# Patient Record
Sex: MALE | Race: WHITE | Employment: OTHER | URBAN - METROPOLITAN AREA
[De-identification: names, ages, dates, MRNs, and addresses within clinical notes are randomized per-mention and may not be internally consistent; named-entity substitution may affect disease eponyms.]

---

## 2017-05-15 ENCOUNTER — OFFICE VISIT (OUTPATIENT)
Dept: INTERNAL MEDICINE | Age: 77
End: 2017-05-15

## 2017-05-15 VITALS
SYSTOLIC BLOOD PRESSURE: 138 MMHG | HEIGHT: 70 IN | BODY MASS INDEX: 24.62 KG/M2 | DIASTOLIC BLOOD PRESSURE: 68 MMHG | HEART RATE: 72 BPM | WEIGHT: 172 LBS

## 2017-05-15 DIAGNOSIS — C32.9 LARYNX CANCER (HCC): Chronic | ICD-10-CM

## 2017-05-15 DIAGNOSIS — R97.20 ELEVATED PSA: ICD-10-CM

## 2017-05-15 DIAGNOSIS — R26.9 ABNORMALITY OF GAIT AND MOBILITY: ICD-10-CM

## 2017-05-15 DIAGNOSIS — I10 ESSENTIAL HYPERTENSION: Chronic | ICD-10-CM

## 2017-05-15 DIAGNOSIS — Z00.00 PREVENTATIVE HEALTH CARE: Primary | ICD-10-CM

## 2017-05-15 PROCEDURE — 99214 OFFICE O/P EST MOD 30 MIN: CPT | Performed by: INTERNAL MEDICINE

## 2017-05-15 PROCEDURE — G8420 CALC BMI NORM PARAMETERS: HCPCS | Performed by: INTERNAL MEDICINE

## 2017-05-15 PROCEDURE — 1036F TOBACCO NON-USER: CPT | Performed by: INTERNAL MEDICINE

## 2017-05-15 PROCEDURE — 4040F PNEUMOC VAC/ADMIN/RCVD: CPT | Performed by: INTERNAL MEDICINE

## 2017-05-15 PROCEDURE — 1123F ACP DISCUSS/DSCN MKR DOCD: CPT | Performed by: INTERNAL MEDICINE

## 2017-05-15 PROCEDURE — G8427 DOCREV CUR MEDS BY ELIG CLIN: HCPCS | Performed by: INTERNAL MEDICINE

## 2017-05-15 ASSESSMENT — ENCOUNTER SYMPTOMS
TROUBLE SWALLOWING: 0
GASTROINTESTINAL NEGATIVE: 1
BACK PAIN: 0

## 2017-05-15 ASSESSMENT — PATIENT HEALTH QUESTIONNAIRE - PHQ9
1. LITTLE INTEREST OR PLEASURE IN DOING THINGS: 0
SUM OF ALL RESPONSES TO PHQ QUESTIONS 1-9: 0
SUM OF ALL RESPONSES TO PHQ9 QUESTIONS 1 & 2: 0
2. FEELING DOWN, DEPRESSED OR HOPELESS: 0

## 2017-05-23 DIAGNOSIS — R26.9 ABNORMALITY OF GAIT AND MOBILITY: ICD-10-CM

## 2017-06-12 ENCOUNTER — TELEPHONE (OUTPATIENT)
Dept: INTERNAL MEDICINE | Age: 77
End: 2017-06-12

## 2017-11-16 ENCOUNTER — TELEPHONE (OUTPATIENT)
Dept: INTERNAL MEDICINE | Age: 77
End: 2017-11-16

## 2017-11-16 RX ORDER — LOSARTAN POTASSIUM 100 MG/1
100 TABLET ORAL DAILY
Qty: 90 TABLET | Refills: 3 | Status: SHIPPED | OUTPATIENT
Start: 2017-11-16 | End: 2018-11-21 | Stop reason: SDUPTHER

## 2017-11-20 ENCOUNTER — OFFICE VISIT (OUTPATIENT)
Dept: INTERNAL MEDICINE | Age: 77
End: 2017-11-20

## 2017-11-20 VITALS
WEIGHT: 178 LBS | BODY MASS INDEX: 25.48 KG/M2 | DIASTOLIC BLOOD PRESSURE: 90 MMHG | HEIGHT: 70 IN | SYSTOLIC BLOOD PRESSURE: 148 MMHG | HEART RATE: 80 BPM

## 2017-11-20 DIAGNOSIS — H01.006 BLEPHARITIS OF BOTH EYES, UNSPECIFIED EYELID, UNSPECIFIED TYPE: ICD-10-CM

## 2017-11-20 DIAGNOSIS — R97.20 ELEVATED PSA: ICD-10-CM

## 2017-11-20 DIAGNOSIS — H01.003 BLEPHARITIS OF BOTH EYES, UNSPECIFIED EYELID, UNSPECIFIED TYPE: ICD-10-CM

## 2017-11-20 DIAGNOSIS — I10 ESSENTIAL HYPERTENSION: Chronic | ICD-10-CM

## 2017-11-20 DIAGNOSIS — R26.9 ABNORMALITY OF GAIT AND MOBILITY: ICD-10-CM

## 2017-11-20 DIAGNOSIS — J39.8 CHRONIC MUCUS HYPERSECRETION, RESPIRATORY: ICD-10-CM

## 2017-11-20 PROCEDURE — G8419 CALC BMI OUT NRM PARAM NOF/U: HCPCS | Performed by: INTERNAL MEDICINE

## 2017-11-20 PROCEDURE — 1036F TOBACCO NON-USER: CPT | Performed by: INTERNAL MEDICINE

## 2017-11-20 PROCEDURE — 99214 OFFICE O/P EST MOD 30 MIN: CPT | Performed by: INTERNAL MEDICINE

## 2017-11-20 PROCEDURE — 1123F ACP DISCUSS/DSCN MKR DOCD: CPT | Performed by: INTERNAL MEDICINE

## 2017-11-20 PROCEDURE — G8484 FLU IMMUNIZE NO ADMIN: HCPCS | Performed by: INTERNAL MEDICINE

## 2017-11-20 PROCEDURE — G8427 DOCREV CUR MEDS BY ELIG CLIN: HCPCS | Performed by: INTERNAL MEDICINE

## 2017-11-20 PROCEDURE — 4040F PNEUMOC VAC/ADMIN/RCVD: CPT | Performed by: INTERNAL MEDICINE

## 2017-11-20 RX ORDER — AMLODIPINE BESYLATE 5 MG/1
TABLET ORAL
COMMUNITY
Start: 2017-09-12 | End: 2018-05-21

## 2017-11-20 ASSESSMENT — ENCOUNTER SYMPTOMS
COUGH: 1
EYE ITCHING: 1
EYE DISCHARGE: 1
TROUBLE SWALLOWING: 0

## 2017-11-20 NOTE — PROGRESS NOTES
SUBJECTIVE:    Patient ID: Shakir Wagoner is an 67 y.o. male. HPI: Patient here today for the f/u of chronic problems -- see Problem List and associated comments. New issues or complaints include (also see Assessment for more details):  Here for routine checkup. Home blood pressure monitoring reviewed. He declines a flu shot and declines colonoscopy. Review of Systems   Constitutional: Negative for fever. HENT: Negative for trouble swallowing. Eyes: Positive for discharge and itching. Respiratory: Positive for cough. Cardiovascular: Negative. Musculoskeletal: Negative. Neurological: Negative. Psychiatric/Behavioral: Negative. OBJECTIVE:    BP (!) 148/90 (Site: Left Arm, Position: Sitting, Cuff Size: Medium Adult)   Pulse 80   Ht 5' 10\" (1.778 m)   Wt 178 lb (80.7 kg)   BMI 25.54 kg/m²      Physical Exam   Constitutional: He is oriented to person, place, and time. He appears well-developed and well-nourished. No distress. Eyes: EOM are normal. Right eye exhibits no discharge. Left eye exhibits no discharge. No scleral icterus. Bilateral lower lidserythematous but no discharge   Neck: No JVD present. Carotid bruit is not present. No neck rigidity. Decreased range of motion present. Resection site OK   Cardiovascular: Normal rate, regular rhythm and normal heart sounds. Pulmonary/Chest: Effort normal and breath sounds normal. No stridor. No respiratory distress. Abdominal: Soft. Bowel sounds are normal.   Musculoskeletal: He exhibits no edema. Lymphadenopathy:        Head (right side): No submandibular adenopathy present. Head (left side): No submandibular adenopathy present. He has no cervical adenopathy. Right: No supraclavicular adenopathy present. Left: No supraclavicular adenopathy present. Neurological: He is alert and oriented to person, place, and time. He has normal strength. He displays no atrophy. He exhibits normal muscle tone.  Gait abnormal.   Skin: He is not diaphoretic. No pallor. Psychiatric: He has a normal mood and affect. His behavior is normal. Judgment and thought content normal.       ASSESSMENT:       Encounter Diagnoses   Name Primary?  Essential hypertension     Chronic mucus hypersecretion, respiratory     Elevated PSA     Blepharitis of both eyes, unspecified eyelid, unspecified type     Abnormality of gait and mobility        HTN (hypertension)  Home BP monitoring okay. Occasional elevated at 140s but overall good blood pressure control. Chronic mucus hypersecretion, respiratory  Stable    Elevated PSA  3.15continue to monitor    Blepharitis of both eyes  Bilateral. Seems to wax and wane. Advised to get baby shampoo and use routinely. He does not shower or wash much. Abnormality of gait and mobility  He declined EMG or imaging. PLAN:  See ASSESSMENT for evaluation & PLAN    No orders of the defined types were placed in this encounter. PSH, PMH, SH and FH reviewed and noted. Recent and past labs, tests and consults also reviewed. Recent or new meds also reviewed.

## 2017-11-20 NOTE — ASSESSMENT & PLAN NOTE
Bilateral. Seems to wax and wane. Advised to get baby shampoo and use routinely. He does not shower or wash much.

## 2018-05-21 ENCOUNTER — OFFICE VISIT (OUTPATIENT)
Dept: INTERNAL MEDICINE | Age: 78
End: 2018-05-21

## 2018-05-21 VITALS
HEART RATE: 72 BPM | DIASTOLIC BLOOD PRESSURE: 70 MMHG | OXYGEN SATURATION: 95 % | BODY MASS INDEX: 24.91 KG/M2 | WEIGHT: 174 LBS | SYSTOLIC BLOOD PRESSURE: 138 MMHG | HEIGHT: 70 IN

## 2018-05-21 DIAGNOSIS — I34.0 MITRAL VALVE INSUFFICIENCY, UNSPECIFIED ETIOLOGY: Chronic | ICD-10-CM

## 2018-05-21 DIAGNOSIS — R97.20 ELEVATED PSA: Primary | ICD-10-CM

## 2018-05-21 DIAGNOSIS — I10 ESSENTIAL HYPERTENSION: Chronic | ICD-10-CM

## 2018-05-21 DIAGNOSIS — Z00.00 PREVENTATIVE HEALTH CARE: ICD-10-CM

## 2018-05-21 PROCEDURE — 4040F PNEUMOC VAC/ADMIN/RCVD: CPT | Performed by: INTERNAL MEDICINE

## 2018-05-21 PROCEDURE — G8420 CALC BMI NORM PARAMETERS: HCPCS | Performed by: INTERNAL MEDICINE

## 2018-05-21 PROCEDURE — G8427 DOCREV CUR MEDS BY ELIG CLIN: HCPCS | Performed by: INTERNAL MEDICINE

## 2018-05-21 PROCEDURE — 1123F ACP DISCUSS/DSCN MKR DOCD: CPT | Performed by: INTERNAL MEDICINE

## 2018-05-21 PROCEDURE — 1036F TOBACCO NON-USER: CPT | Performed by: INTERNAL MEDICINE

## 2018-05-21 PROCEDURE — 99214 OFFICE O/P EST MOD 30 MIN: CPT | Performed by: INTERNAL MEDICINE

## 2018-05-21 RX ORDER — AMLODIPINE BESYLATE 10 MG/1
TABLET ORAL
COMMUNITY
Start: 2018-03-15 | End: 2018-12-04 | Stop reason: SDUPTHER

## 2018-05-21 ASSESSMENT — PATIENT HEALTH QUESTIONNAIRE - PHQ9
SUM OF ALL RESPONSES TO PHQ QUESTIONS 1-9: 0
1. LITTLE INTEREST OR PLEASURE IN DOING THINGS: 0
SUM OF ALL RESPONSES TO PHQ9 QUESTIONS 1 & 2: 0
2. FEELING DOWN, DEPRESSED OR HOPELESS: 0

## 2018-05-21 ASSESSMENT — ENCOUNTER SYMPTOMS
RESPIRATORY NEGATIVE: 1
GASTROINTESTINAL NEGATIVE: 1
TROUBLE SWALLOWING: 0

## 2018-11-15 ENCOUNTER — TELEPHONE (OUTPATIENT)
Dept: DERMATOLOGY | Age: 78
End: 2018-11-15

## 2018-11-21 RX ORDER — LOSARTAN POTASSIUM 100 MG/1
TABLET ORAL
Qty: 90 TABLET | Refills: 2 | Status: SHIPPED | OUTPATIENT
Start: 2018-11-21 | End: 2019-02-13

## 2018-12-04 ENCOUNTER — OFFICE VISIT (OUTPATIENT)
Dept: INTERNAL MEDICINE CLINIC | Age: 78
End: 2018-12-04
Payer: MEDICARE

## 2018-12-04 VITALS
BODY MASS INDEX: 24.91 KG/M2 | HEIGHT: 70 IN | SYSTOLIC BLOOD PRESSURE: 132 MMHG | WEIGHT: 174 LBS | DIASTOLIC BLOOD PRESSURE: 70 MMHG

## 2018-12-04 DIAGNOSIS — Z23 NEED FOR INFLUENZA VACCINATION: ICD-10-CM

## 2018-12-04 DIAGNOSIS — I87.2 VENOUS STASIS DERMATITIS OF LEFT LOWER EXTREMITY: ICD-10-CM

## 2018-12-04 DIAGNOSIS — R60.0 EDEMA OF BOTH LEGS: ICD-10-CM

## 2018-12-04 DIAGNOSIS — I10 ESSENTIAL HYPERTENSION: Primary | Chronic | ICD-10-CM

## 2018-12-04 DIAGNOSIS — R97.20 ELEVATED PSA: ICD-10-CM

## 2018-12-04 DIAGNOSIS — I34.0 MITRAL VALVE INSUFFICIENCY, UNSPECIFIED ETIOLOGY: Chronic | ICD-10-CM

## 2018-12-04 PROCEDURE — G8427 DOCREV CUR MEDS BY ELIG CLIN: HCPCS | Performed by: INTERNAL MEDICINE

## 2018-12-04 PROCEDURE — G0008 ADMIN INFLUENZA VIRUS VAC: HCPCS | Performed by: INTERNAL MEDICINE

## 2018-12-04 PROCEDURE — 4040F PNEUMOC VAC/ADMIN/RCVD: CPT | Performed by: INTERNAL MEDICINE

## 2018-12-04 PROCEDURE — 99215 OFFICE O/P EST HI 40 MIN: CPT | Performed by: INTERNAL MEDICINE

## 2018-12-04 PROCEDURE — 1123F ACP DISCUSS/DSCN MKR DOCD: CPT | Performed by: INTERNAL MEDICINE

## 2018-12-04 PROCEDURE — G8420 CALC BMI NORM PARAMETERS: HCPCS | Performed by: INTERNAL MEDICINE

## 2018-12-04 PROCEDURE — 1036F TOBACCO NON-USER: CPT | Performed by: INTERNAL MEDICINE

## 2018-12-04 PROCEDURE — 1101F PT FALLS ASSESS-DOCD LE1/YR: CPT | Performed by: INTERNAL MEDICINE

## 2018-12-04 PROCEDURE — 90662 IIV NO PRSV INCREASED AG IM: CPT | Performed by: INTERNAL MEDICINE

## 2018-12-04 PROCEDURE — G8482 FLU IMMUNIZE ORDER/ADMIN: HCPCS | Performed by: INTERNAL MEDICINE

## 2018-12-04 RX ORDER — TRIAMTERENE AND HYDROCHLOROTHIAZIDE 37.5; 25 MG/1; MG/1
1 TABLET ORAL DAILY
Qty: 90 TABLET | Refills: 3 | Status: SHIPPED | OUTPATIENT
Start: 2018-12-04 | End: 2019-06-04

## 2018-12-04 RX ORDER — AMLODIPINE BESYLATE 10 MG/1
5 TABLET ORAL
Qty: 30 TABLET | COMMUNITY
Start: 2018-12-04 | End: 2019-02-13

## 2018-12-04 ASSESSMENT — ENCOUNTER SYMPTOMS
SHORTNESS OF BREATH: 0
TROUBLE SWALLOWING: 0

## 2018-12-04 NOTE — PROGRESS NOTES
Vaccine Information Sheet, \"Influenza - Inactivated\"  given to Leticia Mendez, or parent/legal guardian of  Leticia Mendez and verbalized understanding. Patient responses:    Have you ever had a reaction to a flu vaccine? No  Are you able to eat eggs without adverse effects? Yes  Do you have any current illness? No  Have you ever had Guillian [place] Syndrome? No    Flu vaccine given per order. Please see immunization tab.
strength. He displays no tremor. Gait abnormal.   Skin: He is not diaphoretic. No pallor. Raised dermatitis-like lesion over left shin with 2 areas of ulceration with eschars. Psychiatric: He has a normal mood and affect. His behavior is normal. Judgment and thought content normal.       ASSESSMENT:       Encounter Diagnoses   Name Primary?  Essential hypertension Yes    Need for influenza vaccination     Elevated PSA     Mitral valve insufficiency, unspecified etiology     Edema of both legs     Venous stasis dermatitis of left lower extremity        HTN (hypertension)  BP well controlled on amlodipine 10 mg and losartan. However he is having significant lower extremity edema. We'll decrease amlodipine to one half tablet-5 mg, and add Maxide. Labs in 6 months. Monitor BP. Elevated PSA  Slightly increased from last time at 3.51. Recheck 6 months. Edema of both legs  More edema since amlodipine was increased to 10 mg.  We'll go to 5 mg amlodipine and add Maxide. Patient to monitor BP. Labs and 6 months. Venous stasis dermatitis of left lower extremity  Suspect venous stasis dermatitis since leg is swollen. However no rash or lesions on the right lower extremity. There is also some nonhealing ulcerations that have been scabbed over. Will refer to dermatology for their opinion. Hopefully this will clear as the edema is dealt with. Mitral valve regurgitation  Heart murmur may be louder and more prominent than last time. Since he has more edema in his lower extremities will repeat echo.         PLAN:See ASSESSMENT for evaluation & PLAN     Orders Placed This Encounter   Procedures    INFLUENZA, HIGH DOSE, 65 YRS +, IM, PF, PREFILL SYR, 0.5ML (FLUZONE HD)    Basic Metabolic Panel     Standing Status:   Future     Standing Expiration Date:   12/4/2019    PSA, Prostatic Specific Antigen     Standing Status:   Future     Standing Expiration Date:   12/5/2019    ECHO Complete 2D W

## 2018-12-04 NOTE — ASSESSMENT & PLAN NOTE
More edema since amlodipine was increased to 10 mg.  We'll go to 5 mg amlodipine and add Maxide. Patient to monitor BP. Labs and 6 months.

## 2018-12-07 ENCOUNTER — TELEPHONE (OUTPATIENT)
Dept: INTERNAL MEDICINE CLINIC | Age: 78
End: 2018-12-07

## 2018-12-12 ENCOUNTER — TELEPHONE (OUTPATIENT)
Dept: INTERNAL MEDICINE CLINIC | Age: 78
End: 2018-12-12

## 2019-01-04 ENCOUNTER — TELEPHONE (OUTPATIENT)
Dept: INTERNAL MEDICINE CLINIC | Age: 79
End: 2019-01-04

## 2019-01-04 RX ORDER — AMLODIPINE BESYLATE 5 MG/1
5 TABLET ORAL DAILY
Qty: 30 TABLET | Refills: 5 | Status: SHIPPED | OUTPATIENT
Start: 2019-01-04 | End: 2019-02-13 | Stop reason: SDUPTHER

## 2019-01-08 ENCOUNTER — TELEPHONE (OUTPATIENT)
Dept: INTERNAL MEDICINE CLINIC | Age: 79
End: 2019-01-08

## 2019-01-08 RX ORDER — OLMESARTAN MEDOXOMIL 20 MG/1
20 TABLET ORAL DAILY
Qty: 90 TABLET | Refills: 2 | OUTPATIENT
Start: 2019-01-08 | End: 2019-02-13 | Stop reason: SDUPTHER

## 2019-01-10 ENCOUNTER — TELEPHONE (OUTPATIENT)
Dept: INTERNAL MEDICINE CLINIC | Age: 79
End: 2019-01-10

## 2019-02-04 ENCOUNTER — HOSPITAL ENCOUNTER (OUTPATIENT)
Dept: NON INVASIVE DIAGNOSTICS | Age: 79
Discharge: HOME | End: 2019-02-04
Payer: MEDICARE

## 2019-02-04 LAB
LV EF: 55 %
LVEF MODALITY: NORMAL

## 2019-02-04 PROCEDURE — 93306 TTE W/DOPPLER COMPLETE: CPT

## 2019-02-13 ENCOUNTER — TELEPHONE (OUTPATIENT)
Dept: INTERNAL MEDICINE CLINIC | Age: 79
End: 2019-02-13

## 2019-02-13 RX ORDER — OLMESARTAN MEDOXOMIL 20 MG/1
TABLET ORAL
Qty: 90 TABLET | Refills: 2 | COMMUNITY
Start: 2019-02-13 | End: 2019-09-30

## 2019-02-13 RX ORDER — AMLODIPINE BESYLATE 5 MG/1
TABLET ORAL
Qty: 30 TABLET | Refills: 5 | COMMUNITY
Start: 2019-02-13 | End: 2019-02-21

## 2019-02-21 ENCOUNTER — TELEPHONE (OUTPATIENT)
Dept: INTERNAL MEDICINE CLINIC | Age: 79
End: 2019-02-21

## 2019-03-19 ENCOUNTER — TELEPHONE (OUTPATIENT)
Dept: INTERNAL MEDICINE CLINIC | Age: 79
End: 2019-03-19

## 2019-04-08 ENCOUNTER — OFFICE VISIT (OUTPATIENT)
Dept: INTERNAL MEDICINE CLINIC | Age: 79
End: 2019-04-08
Payer: MEDICARE

## 2019-04-08 VITALS — HEART RATE: 84 BPM | SYSTOLIC BLOOD PRESSURE: 110 MMHG | DIASTOLIC BLOOD PRESSURE: 60 MMHG

## 2019-04-08 DIAGNOSIS — R19.5 LOOSE STOOLS: ICD-10-CM

## 2019-04-08 DIAGNOSIS — K40.90 INGUINAL HERNIA, RIGHT: ICD-10-CM

## 2019-04-08 DIAGNOSIS — L85.3 DRY SKIN DERMATITIS: ICD-10-CM

## 2019-04-08 DIAGNOSIS — R26.9 ABNORMALITY OF GAIT AND MOBILITY: ICD-10-CM

## 2019-04-08 DIAGNOSIS — B35.6 TINEA CRURIS: ICD-10-CM

## 2019-04-08 PROCEDURE — 4040F PNEUMOC VAC/ADMIN/RCVD: CPT | Performed by: INTERNAL MEDICINE

## 2019-04-08 PROCEDURE — G8420 CALC BMI NORM PARAMETERS: HCPCS | Performed by: INTERNAL MEDICINE

## 2019-04-08 PROCEDURE — G8427 DOCREV CUR MEDS BY ELIG CLIN: HCPCS | Performed by: INTERNAL MEDICINE

## 2019-04-08 PROCEDURE — 99214 OFFICE O/P EST MOD 30 MIN: CPT | Performed by: INTERNAL MEDICINE

## 2019-04-08 PROCEDURE — 1036F TOBACCO NON-USER: CPT | Performed by: INTERNAL MEDICINE

## 2019-04-08 PROCEDURE — 1123F ACP DISCUSS/DSCN MKR DOCD: CPT | Performed by: INTERNAL MEDICINE

## 2019-04-08 RX ORDER — BETAMETHASONE DIPROPIONATE 0.5 MG/G
CREAM TOPICAL 2 TIMES DAILY
Qty: 15 G | Refills: 0 | Status: SHIPPED | OUTPATIENT
Start: 2019-04-08 | End: 2019-09-30

## 2019-04-08 RX ORDER — CHOLESTYRAMINE 4 G/9G
POWDER, FOR SUSPENSION ORAL
Qty: 1 CAN | Refills: 5 | Status: SHIPPED | OUTPATIENT
Start: 2019-04-08 | End: 2019-06-04

## 2019-04-08 RX ORDER — NYSTATIN 100000 [USP'U]/G
POWDER TOPICAL
Qty: 60 G | Refills: 1 | Status: SHIPPED | OUTPATIENT
Start: 2019-04-08 | End: 2019-09-30

## 2019-04-08 ASSESSMENT — PATIENT HEALTH QUESTIONNAIRE - PHQ9
SUM OF ALL RESPONSES TO PHQ QUESTIONS 1-9: 0
SUM OF ALL RESPONSES TO PHQ QUESTIONS 1-9: 0
2. FEELING DOWN, DEPRESSED OR HOPELESS: 0
SUM OF ALL RESPONSES TO PHQ9 QUESTIONS 1 & 2: 0
1. LITTLE INTEREST OR PLEASURE IN DOING THINGS: 0

## 2019-04-08 ASSESSMENT — ENCOUNTER SYMPTOMS
SHORTNESS OF BREATH: 0
DIARRHEA: 1

## 2019-04-08 NOTE — PROGRESS NOTES
SUBJECTIVE:  Patient ID: Urvashi Castellano is an 77 y.o. male. HPI: Patient here today for the f/u of chronic problems-- see Problem List and associated comments. New issues or complaints include (also see Assessment for more details):  Patient here today with his wife with several small issues. He has noticed an enlarging bulge in his right scrotal sac groin area. No pain. No one is sure exactly how long this is been there. He also has a rash in the groin area. Additionally he has had an itchy rash on his forearms for a few weeks. Patient also has loose stools. Approximately once per day in this goes back at least a couple months. Been no change in medications or diet. No pain and no blood. His wife has no symptoms. Additionally he has had more difficulty ambulating and getting out of chairs. Review of Systems   Constitutional: Positive for activity change. Respiratory: Negative for shortness of breath. Cardiovascular: Negative for chest pain. Gastrointestinal: Positive for diarrhea. Genitourinary: Negative for difficulty urinating. Musculoskeletal: Positive for gait problem. Skin: Positive for rash. Neurological: Positive for weakness. OBJECTIVE:    /60 (Site: Left Upper Arm, Position: Sitting, Cuff Size: Medium Adult)   Pulse 84      Physical Exam   Constitutional: He appears well-nourished. Eyes: No scleral icterus. Cardiovascular: Normal rate. Pulmonary/Chest: Effort normal. No respiratory distress. Abdominal: Soft. Bowel sounds are normal. He exhibits no distension. A hernia is present. Hernia confirmed positive in the right inguinal area (moderate sized right inguinal hernia versus possible large scrotal cystocele-nontender). Neurological: He displays tremor. He exhibits abnormal muscle tone. Coordination and gait abnormal.   Difficulty getting out of her wheelchair, and difficulty initiating steps and movement. Some spasticity noted.    Skin: Rash (inguinal-groin erythema) noted. He is not diaphoretic. Dry rash forearms       ASSESSMENT:       Encounter Diagnoses   Name Primary?  Inguinal hernia, right     Dry skin dermatitis     Tinea cruris     Abnormality of gait and mobility     Loose stools        Abnormality of gait and mobility  Situation worsening per his wife. Consider some parkinsonian features. Try low-dose Sinemet. Consider neurology evaluation. See past history of exacerbation after previous neck surgery. Patient had refused EMG and MRI in past.    Inguinal hernia, right  Inguinal hernia versus large cystocele. Difficult to differentiate. It does not bother the patient at all. Can monitor but will refer if it appears to be enlarging and certainly if there is any pain. Dry skin dermatitis  On  arms-try some steroid cream    Tinea cruris  Moderately severe in the groin area-try nystatin powder    Loose stools  Approximately once per day patient has loose stools. Due to his physical in capacities is difficult for him and often go to the toilet in time. There is no cramping. No blood. Difficult for any thorough evaluation for colonoscopy at this time. An try empiric cholestyramine powder. PLAN:See ASSESSMENT for evaluation & PLAN     No orders of the defined types were placed in this encounter. PSH, PMH, SH and FH reviewed and noted. Recent and past labs, tests and consultsalso reviewed. Recent or new meds also reviewed.

## 2019-04-08 NOTE — ASSESSMENT & PLAN NOTE
Approximately once per day patient has loose stools. Due to his physical in capacities is difficult for him and often go to the toilet in time. There is no cramping. No blood. Difficult for any thorough evaluation for colonoscopy at this time. An try empiric cholestyramine powder.

## 2019-04-08 NOTE — ASSESSMENT & PLAN NOTE
Situation worsening per his wife. Consider some parkinsonian features. Try low-dose Sinemet. Consider neurology evaluation. See past history of exacerbation after previous neck surgery.   Patient had refused EMG and MRI in past.

## 2019-04-18 ENCOUNTER — TELEPHONE (OUTPATIENT)
Dept: INTERNAL MEDICINE CLINIC | Age: 79
End: 2019-04-18

## 2019-04-18 NOTE — TELEPHONE ENCOUNTER
Spoke to pt and wife and they both stated they do not know if it is working or not because they do not know what it is supposed to do. Stated they haven't noticed anything different.

## 2019-04-19 NOTE — TELEPHONE ENCOUNTER
I wanted to see if it helped w/ his gait. Try the following for 2 weeks and INB then I rec a neurologist opinion.   Sinemet 25/100   #42    1 tid

## 2019-05-29 ENCOUNTER — TELEPHONE (OUTPATIENT)
Dept: INTERNAL MEDICINE CLINIC | Age: 79
End: 2019-05-29

## 2019-05-29 NOTE — TELEPHONE ENCOUNTER
BP 5/13/19  106/48 pulse 81  BP 5/28/19 87/48 pulse 85  BP 5/28/19 79/41 pulse 84      Pt wife  Lamin Villatoro called in stating her husbands BP is very low.       Please call and advise

## 2019-05-31 LAB
ANION GAP SERPL CALCULATED.3IONS-SCNC: 14 MMOL/L (ref 6–18)
BUN BLDV-MCNC: 49 MG/DL (ref 8–26)
CALCIUM SERPL-MCNC: 9.8 MG/DL (ref 8.8–10.1)
CHLORIDE BLD-SCNC: 107 MEQ/L (ref 101–111)
CO2: 23 MMOL/L (ref 22–29)
CREAT SERPL-MCNC: 1.66 MG/DL (ref 0.64–1.27)
GFR AFRICAN AMERICAN: 43 ML/MIN/1.73 M2
GFR NON-AFRICAN AMERICAN: 38 ML/MIN/1.73 M2
GLUCOSE BLD-MCNC: 109 MG/DL (ref 70–99)
POTASSIUM SERPL-SCNC: 4.5 MEQ/L (ref 3.6–5.1)
PROSTATE SPECIFIC ANTIGEN: 4.92 NG/ML
SODIUM BLD-SCNC: 139 MEQ/L (ref 135–145)

## 2019-06-04 ENCOUNTER — OFFICE VISIT (OUTPATIENT)
Dept: INTERNAL MEDICINE CLINIC | Age: 79
End: 2019-06-04
Payer: MEDICARE

## 2019-06-04 ENCOUNTER — TELEPHONE (OUTPATIENT)
Dept: INTERNAL MEDICINE CLINIC | Age: 79
End: 2019-06-04

## 2019-06-04 VITALS
BODY MASS INDEX: 20.33 KG/M2 | HEIGHT: 70 IN | SYSTOLIC BLOOD PRESSURE: 130 MMHG | DIASTOLIC BLOOD PRESSURE: 60 MMHG | WEIGHT: 142 LBS | HEART RATE: 60 BPM

## 2019-06-04 DIAGNOSIS — R97.20 ELEVATED PSA: Primary | ICD-10-CM

## 2019-06-04 DIAGNOSIS — R63.4 WEIGHT LOSS: ICD-10-CM

## 2019-06-04 DIAGNOSIS — R26.9 ABNORMALITY OF GAIT AND MOBILITY: ICD-10-CM

## 2019-06-04 DIAGNOSIS — N28.9 RENAL INSUFFICIENCY: ICD-10-CM

## 2019-06-04 DIAGNOSIS — R19.5 LOOSE STOOLS: ICD-10-CM

## 2019-06-04 DIAGNOSIS — R97.20 ELEVATED PSA: ICD-10-CM

## 2019-06-04 DIAGNOSIS — I10 ESSENTIAL HYPERTENSION: Chronic | ICD-10-CM

## 2019-06-04 LAB
A/G RATIO: 1.7 (ref 1.1–2.2)
ALBUMIN SERPL-MCNC: 4.5 G/DL (ref 3.4–5)
ALP BLD-CCNC: 88 U/L (ref 40–129)
ALT SERPL-CCNC: 15 U/L (ref 10–40)
ANION GAP SERPL CALCULATED.3IONS-SCNC: 17 MMOL/L (ref 3–16)
AST SERPL-CCNC: 17 U/L (ref 15–37)
BASOPHILS ABSOLUTE: 0.1 K/UL (ref 0–0.2)
BASOPHILS RELATIVE PERCENT: 0.6 %
BILIRUB SERPL-MCNC: <0.2 MG/DL (ref 0–1)
BUN BLDV-MCNC: 43 MG/DL (ref 7–20)
CALCIUM SERPL-MCNC: 10.2 MG/DL (ref 8.3–10.6)
CHLORIDE BLD-SCNC: 108 MMOL/L (ref 99–110)
CO2: 21 MMOL/L (ref 21–32)
CREAT SERPL-MCNC: 1.9 MG/DL (ref 0.8–1.3)
EOSINOPHILS ABSOLUTE: 0.4 K/UL (ref 0–0.6)
EOSINOPHILS RELATIVE PERCENT: 3.3 %
GFR AFRICAN AMERICAN: 42
GFR NON-AFRICAN AMERICAN: 34
GLOBULIN: 2.7 G/DL
GLUCOSE BLD-MCNC: 110 MG/DL (ref 70–99)
HCT VFR BLD CALC: 29.9 % (ref 40.5–52.5)
HEMOGLOBIN: 10 G/DL (ref 13.5–17.5)
LYMPHOCYTES ABSOLUTE: 2.1 K/UL (ref 1–5.1)
LYMPHOCYTES RELATIVE PERCENT: 19.5 %
MCH RBC QN AUTO: 31.2 PG (ref 26–34)
MCHC RBC AUTO-ENTMCNC: 33.4 G/DL (ref 31–36)
MCV RBC AUTO: 93.4 FL (ref 80–100)
MONOCYTES ABSOLUTE: 1.2 K/UL (ref 0–1.3)
MONOCYTES RELATIVE PERCENT: 11.3 %
NEUTROPHILS ABSOLUTE: 7.1 K/UL (ref 1.7–7.7)
NEUTROPHILS RELATIVE PERCENT: 65.3 %
PDW BLD-RTO: 14.6 % (ref 12.4–15.4)
PLATELET # BLD: 294 K/UL (ref 135–450)
PMV BLD AUTO: 8.9 FL (ref 5–10.5)
POTASSIUM SERPL-SCNC: 5.5 MMOL/L (ref 3.5–5.1)
PROSTATE SPECIFIC ANTIGEN: 4.39 NG/ML (ref 0–4)
RBC # BLD: 3.2 M/UL (ref 4.2–5.9)
SODIUM BLD-SCNC: 146 MMOL/L (ref 136–145)
TOTAL PROTEIN: 7.2 G/DL (ref 6.4–8.2)
TSH REFLEX FT4: 2.04 UIU/ML (ref 0.27–4.2)
WBC # BLD: 10.8 K/UL (ref 4–11)

## 2019-06-04 PROCEDURE — G8427 DOCREV CUR MEDS BY ELIG CLIN: HCPCS | Performed by: INTERNAL MEDICINE

## 2019-06-04 PROCEDURE — 1123F ACP DISCUSS/DSCN MKR DOCD: CPT | Performed by: INTERNAL MEDICINE

## 2019-06-04 PROCEDURE — G8420 CALC BMI NORM PARAMETERS: HCPCS | Performed by: INTERNAL MEDICINE

## 2019-06-04 PROCEDURE — 99214 OFFICE O/P EST MOD 30 MIN: CPT | Performed by: INTERNAL MEDICINE

## 2019-06-04 PROCEDURE — 1036F TOBACCO NON-USER: CPT | Performed by: INTERNAL MEDICINE

## 2019-06-04 PROCEDURE — 4040F PNEUMOC VAC/ADMIN/RCVD: CPT | Performed by: INTERNAL MEDICINE

## 2019-06-04 ASSESSMENT — PATIENT HEALTH QUESTIONNAIRE - PHQ9
2. FEELING DOWN, DEPRESSED OR HOPELESS: 0
SUM OF ALL RESPONSES TO PHQ QUESTIONS 1-9: 0
SUM OF ALL RESPONSES TO PHQ9 QUESTIONS 1 & 2: 0
1. LITTLE INTEREST OR PLEASURE IN DOING THINGS: 0
SUM OF ALL RESPONSES TO PHQ QUESTIONS 1-9: 0

## 2019-06-04 ASSESSMENT — ENCOUNTER SYMPTOMS
ABDOMINAL PAIN: 0
DIARRHEA: 0
SHORTNESS OF BREATH: 0

## 2019-06-04 NOTE — ASSESSMENT & PLAN NOTE
BP was low last week and Maxide held. BP better today. He is back on his medication. Wife will call later to verify medication and dosages-may stop Maxide permanently.

## 2019-06-04 NOTE — ASSESSMENT & PLAN NOTE
Difficult to tell if this is actual weight loss or difficulty keeping the patient on the scale. His appetite has decreased per his wife. Checking labs again today. May do to get CT scan of chest and abdomen pelvis.   No contrast.

## 2019-06-04 NOTE — PROGRESS NOTES
SUBJECTIVE:  Patient ID: Karyle Naegeli is an 77 y.o. male. HPI: Patient here today for the f/u of chronic problems-- see Problem List and associated comments. New issues or complaints include (alsosee Assessment for more details):  Patient here for follow-up on his labs which showed an elevated PSA and an elevated creatinine. He is more tired than usual.  He is not eating as well.  he appears to lost weight but is unreliable standing on the scale he also had low blood pressure last week and call the office and his water pill was held for 2 days. He brings in a blood pressure reading for the last couple days which is okay. He denies any new symptoms of pain, shortness of breath or GI distress. He has been out of the cholestyramine for couple weeks and the loose stools not return. The Sinemet provided no benefit. He stopped that. Review of Systems   Constitutional: Positive for fatigue and unexpected weight change. Negative for activity change and appetite change. Respiratory: Negative for shortness of breath. Cardiovascular: Negative for chest pain. Gastrointestinal: Negative for abdominal pain and diarrhea. Genitourinary: Negative for dysuria. Neurological: Positive for weakness. Hematological: Negative for adenopathy. OBJECTIVE:    /60 (Site: Left Upper Arm, Position: Sitting, Cuff Size: Medium Adult)   Pulse 60   Ht 5' 10\" (1.778 m)   Wt 142 lb (64.4 kg)   BMI 20.37 kg/m²      Physical Exam   Constitutional: He is oriented to person, place, and time. He appears well-developed and well-nourished. No distress. Eyes: No scleral icterus. Neck: No JVD present. Carotid bruit is not present. Trach site ok   Cardiovascular: Normal rate and regular rhythm. Murmur heard. Systolic murmur is present with a grade of 2/6. Pulses:       Carotid pulses are 2+ on the right side, and 2+ on the left side. Radial pulses are 2+ on the right side, and 2+ on the left side. Pulmonary/Chest: Effort normal and breath sounds normal. No stridor. No respiratory distress. He has no wheezes. He has no rales. Abdominal: Soft. Bowel sounds are normal. He exhibits no abdominal bruit. There is no hepatosplenomegaly. There is no tenderness. Musculoskeletal: He exhibits edema (bilateral ankles). Lymphadenopathy:        Head (right side): No submandibular adenopathy present. Head (left side): No submandibular adenopathy present. He has no cervical adenopathy. Right: No supraclavicular and no epitrochlear adenopathy present. Left: No supraclavicular and no epitrochlear adenopathy present. Neurological: He is alert and oriented to person, place, and time. He has normal strength. He displays no tremor. Gait abnormal.   Skin: He is not diaphoretic. No pallor. Psychiatric: He has a normal mood and affect. His behavior is normal. Judgment and thought content normal.       ASSESSMENT:       Encounter Diagnoses   Name Primary?  Elevated PSA Yes    Renal insufficiency     Weight loss     Essential hypertension     Abnormality of gait and mobility     Loose stools        HTN (hypertension)  BP was low last week and Maxide held. BP better today. He is back on his medication. Wife will call later to verify medication and dosages-may stop Maxide permanently. Elevated PSA  PSA increased to 4.9. Recheck today. If stays elevated or higher then may have to refer to urology if patient agreeable. Abnormality of gait and mobility  No benefit from Sinemet    Loose stools  Cholestyramine stop the loose stools. He is out of the Rx and so far doing okay. Will not restart. Renal insufficiency  Creatinine increased to 1.66. Recheck today. If still elevated consider CT scan of chest and abdomen and possible referral depending on results. Weight loss  Difficult to tell if this is actual weight loss or difficulty keeping the patient on the scale.   His appetite has decreased per his wife. Checking labs again today. May do to get CT scan of chest and abdomen pelvis. No contrast.        PLAN:See ASSESSMENT for evaluation & PLAN     Orders Placed This Encounter   Procedures    CBC Auto Differential     Standing Status:   Future     Standing Expiration Date:   6/3/2020    Comprehensive Metabolic Panel     Standing Status:   Future     Standing Expiration Date:   6/3/2020    TSH WITH REFLEX TO FT4     Standing Status:   Future     Standing Expiration Date:   6/3/2020    PSA, Prostatic Specific Antigen     Standing Status:   Future     Standing Expiration Date:   6/4/2020       PSH, PMH, SH and FH reviewed and noted. Recent and past labs, tests and consultsalso reviewed. Recent or new meds also reviewed.

## 2019-06-04 NOTE — ASSESSMENT & PLAN NOTE
PSA increased to 4.9. Recheck today. If stays elevated or higher then may have to refer to urology if patient agreeable.

## 2019-06-04 NOTE — TELEPHONE ENCOUNTER
Pt calling to inform  what medication he is currently taking.     Olmesartan 10 MG Tablet  Triamterene-Hctze 25 MG Tablet

## 2019-06-06 DIAGNOSIS — R63.4 WEIGHT LOSS: Primary | ICD-10-CM

## 2019-06-06 DIAGNOSIS — N28.9 ACUTE RENAL INSUFFICIENCY: ICD-10-CM

## 2019-06-13 ENCOUNTER — HOSPITAL ENCOUNTER (OUTPATIENT)
Dept: CT IMAGING | Age: 79
Discharge: HOME | End: 2019-06-13
Payer: MEDICARE

## 2019-06-13 DIAGNOSIS — N28.9 ACUTE RENAL INSUFFICIENCY: ICD-10-CM

## 2019-06-13 DIAGNOSIS — R63.4 WEIGHT LOSS: ICD-10-CM

## 2019-06-13 PROCEDURE — 74176 CT ABD & PELVIS W/O CONTRAST: CPT

## 2019-06-14 DIAGNOSIS — R63.4 WEIGHT LOSS: Primary | ICD-10-CM

## 2019-06-19 ENCOUNTER — HOSPITAL ENCOUNTER (OUTPATIENT)
Dept: CT IMAGING | Age: 79
Discharge: HOME | End: 2019-06-19
Payer: MEDICARE

## 2019-06-19 DIAGNOSIS — R63.4 WEIGHT LOSS: ICD-10-CM

## 2019-06-19 PROCEDURE — 71250 CT THORAX DX C-: CPT

## 2019-09-13 ENCOUNTER — TELEPHONE (OUTPATIENT)
Dept: INTERNAL MEDICINE CLINIC | Age: 79
End: 2019-09-13

## 2019-09-13 DIAGNOSIS — K92.1 BLOOD IN STOOL: Primary | ICD-10-CM

## 2019-10-08 ENCOUNTER — ANESTHESIA EVENT (OUTPATIENT)
Dept: ENDOSCOPY | Age: 79
End: 2019-10-08
Payer: MEDICARE

## 2019-10-09 ENCOUNTER — HOSPITAL ENCOUNTER (OUTPATIENT)
Age: 79
Setting detail: OUTPATIENT SURGERY
Discharge: HOME | End: 2019-10-09
Attending: INTERNAL MEDICINE | Admitting: INTERNAL MEDICINE
Payer: MEDICARE

## 2019-10-09 ENCOUNTER — ANESTHESIA (OUTPATIENT)
Dept: ENDOSCOPY | Age: 79
End: 2019-10-09
Payer: MEDICARE

## 2019-10-09 VITALS
BODY MASS INDEX: 21.47 KG/M2 | HEART RATE: 68 BPM | DIASTOLIC BLOOD PRESSURE: 74 MMHG | RESPIRATION RATE: 14 BRPM | HEIGHT: 70 IN | OXYGEN SATURATION: 98 % | TEMPERATURE: 97 F | WEIGHT: 150 LBS | SYSTOLIC BLOOD PRESSURE: 112 MMHG

## 2019-10-09 VITALS
SYSTOLIC BLOOD PRESSURE: 112 MMHG | OXYGEN SATURATION: 100 % | DIASTOLIC BLOOD PRESSURE: 92 MMHG | RESPIRATION RATE: 22 BRPM

## 2019-10-09 PROCEDURE — 7100000011 HC PHASE II RECOVERY - ADDTL 15 MIN: Performed by: INTERNAL MEDICINE

## 2019-10-09 PROCEDURE — 7100000000 HC PACU RECOVERY - FIRST 15 MIN: Performed by: INTERNAL MEDICINE

## 2019-10-09 PROCEDURE — 6360000002 HC RX W HCPCS: Performed by: NURSE ANESTHETIST, CERTIFIED REGISTERED

## 2019-10-09 PROCEDURE — 2709999900 HC NON-CHARGEABLE SUPPLY: Performed by: INTERNAL MEDICINE

## 2019-10-09 PROCEDURE — 2500000003 HC RX 250 WO HCPCS: Performed by: NURSE ANESTHETIST, CERTIFIED REGISTERED

## 2019-10-09 PROCEDURE — 2580000003 HC RX 258: Performed by: ANESTHESIOLOGY

## 2019-10-09 PROCEDURE — 3700000001 HC ADD 15 MINUTES (ANESTHESIA): Performed by: INTERNAL MEDICINE

## 2019-10-09 PROCEDURE — 3700000000 HC ANESTHESIA ATTENDED CARE: Performed by: INTERNAL MEDICINE

## 2019-10-09 PROCEDURE — 7100000010 HC PHASE II RECOVERY - FIRST 15 MIN: Performed by: INTERNAL MEDICINE

## 2019-10-09 PROCEDURE — 3609009500 HC COLONOSCOPY DIAGNOSTIC OR SCREENING: Performed by: INTERNAL MEDICINE

## 2019-10-09 RX ORDER — SODIUM CHLORIDE 9 MG/ML
INJECTION, SOLUTION INTRAVENOUS CONTINUOUS
Status: DISCONTINUED | OUTPATIENT
Start: 2019-10-09 | End: 2019-10-09 | Stop reason: HOSPADM

## 2019-10-09 RX ORDER — SODIUM CHLORIDE 0.9 % (FLUSH) 0.9 %
10 SYRINGE (ML) INJECTION EVERY 12 HOURS SCHEDULED
Status: DISCONTINUED | OUTPATIENT
Start: 2019-10-09 | End: 2019-10-09 | Stop reason: HOSPADM

## 2019-10-09 RX ORDER — ONDANSETRON 2 MG/ML
4 INJECTION INTRAMUSCULAR; INTRAVENOUS
Status: DISCONTINUED | OUTPATIENT
Start: 2019-10-09 | End: 2019-10-09 | Stop reason: HOSPADM

## 2019-10-09 RX ORDER — LIDOCAINE HYDROCHLORIDE 20 MG/ML
INJECTION, SOLUTION EPIDURAL; INFILTRATION; INTRACAUDAL; PERINEURAL PRN
Status: DISCONTINUED | OUTPATIENT
Start: 2019-10-09 | End: 2019-10-09 | Stop reason: SDUPTHER

## 2019-10-09 RX ORDER — PROPOFOL 10 MG/ML
INJECTION, EMULSION INTRAVENOUS PRN
Status: DISCONTINUED | OUTPATIENT
Start: 2019-10-09 | End: 2019-10-09 | Stop reason: SDUPTHER

## 2019-10-09 RX ORDER — SODIUM CHLORIDE 0.9 % (FLUSH) 0.9 %
10 SYRINGE (ML) INJECTION PRN
Status: DISCONTINUED | OUTPATIENT
Start: 2019-10-09 | End: 2019-10-09 | Stop reason: HOSPADM

## 2019-10-09 RX ADMIN — LIDOCAINE HYDROCHLORIDE 10 MG: 20 INJECTION, SOLUTION EPIDURAL; INFILTRATION; INTRACAUDAL; PERINEURAL at 15:21

## 2019-10-09 RX ADMIN — SODIUM CHLORIDE: 9 INJECTION, SOLUTION INTRAVENOUS at 14:43

## 2019-10-09 RX ADMIN — LIDOCAINE HYDROCHLORIDE 10 MG: 20 INJECTION, SOLUTION EPIDURAL; INFILTRATION; INTRACAUDAL; PERINEURAL at 15:13

## 2019-10-09 RX ADMIN — LIDOCAINE HYDROCHLORIDE 20 MG: 20 INJECTION, SOLUTION EPIDURAL; INFILTRATION; INTRACAUDAL; PERINEURAL at 15:07

## 2019-10-09 RX ADMIN — PROPOFOL 20 MG: 10 INJECTION, EMULSION INTRAVENOUS at 15:21

## 2019-10-09 RX ADMIN — LIDOCAINE HYDROCHLORIDE 10 MG: 20 INJECTION, SOLUTION EPIDURAL; INFILTRATION; INTRACAUDAL; PERINEURAL at 15:18

## 2019-10-09 RX ADMIN — PROPOFOL 40 MG: 10 INJECTION, EMULSION INTRAVENOUS at 15:07

## 2019-10-09 RX ADMIN — PROPOFOL 20 MG: 10 INJECTION, EMULSION INTRAVENOUS at 15:16

## 2019-10-09 RX ADMIN — LIDOCAINE HYDROCHLORIDE 10 MG: 20 INJECTION, SOLUTION EPIDURAL; INFILTRATION; INTRACAUDAL; PERINEURAL at 15:16

## 2019-10-09 RX ADMIN — PROPOFOL 20 MG: 10 INJECTION, EMULSION INTRAVENOUS at 15:13

## 2019-10-09 RX ADMIN — PROPOFOL 20 MG: 10 INJECTION, EMULSION INTRAVENOUS at 15:18

## 2019-10-09 RX ADMIN — SODIUM CHLORIDE: 9 INJECTION, SOLUTION INTRAVENOUS at 14:26

## 2019-10-09 ASSESSMENT — PAIN SCALES - GENERAL
PAINLEVEL_OUTOF10: 0

## 2019-10-09 ASSESSMENT — PAIN - FUNCTIONAL ASSESSMENT: PAIN_FUNCTIONAL_ASSESSMENT: 0-10

## 2019-10-09 ASSESSMENT — ENCOUNTER SYMPTOMS: SHORTNESS OF BREATH: 0

## 2019-10-09 ASSESSMENT — LIFESTYLE VARIABLES: SMOKING_STATUS: 0

## 2020-01-01 ENCOUNTER — OFFICE VISIT (OUTPATIENT)
Dept: INTERNAL MEDICINE CLINIC | Age: 80
End: 2020-01-01
Payer: MEDICARE

## 2020-01-01 VITALS
HEIGHT: 70 IN | TEMPERATURE: 99 F | WEIGHT: 160 LBS | SYSTOLIC BLOOD PRESSURE: 128 MMHG | DIASTOLIC BLOOD PRESSURE: 64 MMHG | BODY MASS INDEX: 22.9 KG/M2

## 2020-01-01 VITALS
SYSTOLIC BLOOD PRESSURE: 122 MMHG | TEMPERATURE: 97.7 F | DIASTOLIC BLOOD PRESSURE: 77 MMHG | BODY MASS INDEX: 22.96 KG/M2 | HEIGHT: 70 IN

## 2020-01-01 DIAGNOSIS — M62.81 MUSCLE WEAKNESS: ICD-10-CM

## 2020-01-01 DIAGNOSIS — I10 ESSENTIAL HYPERTENSION: ICD-10-CM

## 2020-01-01 DIAGNOSIS — N28.9 RENAL INSUFFICIENCY: ICD-10-CM

## 2020-01-01 DIAGNOSIS — R97.20 ELEVATED PSA: ICD-10-CM

## 2020-01-01 LAB
A/G RATIO: 1.8 (ref 1.1–2.2)
ALBUMIN SERPL-MCNC: 4.4 G/DL (ref 3.4–5)
ALP BLD-CCNC: 79 U/L (ref 40–129)
ALT SERPL-CCNC: 7 U/L (ref 10–40)
ANION GAP SERPL CALCULATED.3IONS-SCNC: 11 MMOL/L (ref 3–16)
AST SERPL-CCNC: 15 U/L (ref 15–37)
BASOPHILS ABSOLUTE: 0.1 K/UL (ref 0–0.2)
BASOPHILS RELATIVE PERCENT: 1.2 %
BILIRUB SERPL-MCNC: 0.4 MG/DL (ref 0–1)
BUN BLDV-MCNC: 20 MG/DL (ref 7–20)
CALCIUM SERPL-MCNC: 9.8 MG/DL (ref 8.3–10.6)
CHLORIDE BLD-SCNC: 108 MMOL/L (ref 99–110)
CO2: 26 MMOL/L (ref 21–32)
CREAT SERPL-MCNC: 1.2 MG/DL (ref 0.8–1.3)
EOSINOPHILS ABSOLUTE: 0.1 K/UL (ref 0–0.6)
EOSINOPHILS RELATIVE PERCENT: 1.8 %
GFR AFRICAN AMERICAN: >60
GFR NON-AFRICAN AMERICAN: 58
GLOBULIN: 2.4 G/DL
GLUCOSE BLD-MCNC: 109 MG/DL (ref 70–99)
HCT VFR BLD CALC: 36.8 % (ref 40.5–52.5)
HEMOGLOBIN: 12.3 G/DL (ref 13.5–17.5)
LYMPHOCYTES ABSOLUTE: 1.5 K/UL (ref 1–5.1)
LYMPHOCYTES RELATIVE PERCENT: 17.7 %
MCH RBC QN AUTO: 29.6 PG (ref 26–34)
MCHC RBC AUTO-ENTMCNC: 33.3 G/DL (ref 31–36)
MCV RBC AUTO: 88.9 FL (ref 80–100)
MONOCYTES ABSOLUTE: 0.7 K/UL (ref 0–1.3)
MONOCYTES RELATIVE PERCENT: 8.3 %
NEUTROPHILS ABSOLUTE: 5.9 K/UL (ref 1.7–7.7)
NEUTROPHILS RELATIVE PERCENT: 71 %
PDW BLD-RTO: 13.6 % (ref 12.4–15.4)
PLATELET # BLD: 236 K/UL (ref 135–450)
PMV BLD AUTO: 9.1 FL (ref 5–10.5)
POTASSIUM SERPL-SCNC: 4.6 MMOL/L (ref 3.5–5.1)
PROSTATE SPECIFIC ANTIGEN: 4.74 NG/ML (ref 0–4)
RBC # BLD: 4.14 M/UL (ref 4.2–5.9)
SODIUM BLD-SCNC: 145 MMOL/L (ref 136–145)
TOTAL CK: 68 U/L (ref 39–308)
TOTAL PROTEIN: 6.8 G/DL (ref 6.4–8.2)
TSH REFLEX FT4: 1.56 UIU/ML (ref 0.27–4.2)
WBC # BLD: 8.3 K/UL (ref 4–11)

## 2020-01-01 PROCEDURE — G0439 PPPS, SUBSEQ VISIT: HCPCS | Performed by: INTERNAL MEDICINE

## 2020-01-01 PROCEDURE — 1123F ACP DISCUSS/DSCN MKR DOCD: CPT | Performed by: INTERNAL MEDICINE

## 2020-01-01 PROCEDURE — 99213 OFFICE O/P EST LOW 20 MIN: CPT | Performed by: INTERNAL MEDICINE

## 2020-01-01 PROCEDURE — 90694 VACC AIIV4 NO PRSRV 0.5ML IM: CPT | Performed by: INTERNAL MEDICINE

## 2020-01-01 PROCEDURE — G8427 DOCREV CUR MEDS BY ELIG CLIN: HCPCS | Performed by: INTERNAL MEDICINE

## 2020-01-01 PROCEDURE — 4040F PNEUMOC VAC/ADMIN/RCVD: CPT | Performed by: INTERNAL MEDICINE

## 2020-01-01 PROCEDURE — 1036F TOBACCO NON-USER: CPT | Performed by: INTERNAL MEDICINE

## 2020-01-01 PROCEDURE — G8420 CALC BMI NORM PARAMETERS: HCPCS | Performed by: INTERNAL MEDICINE

## 2020-01-01 PROCEDURE — G0008 ADMIN INFLUENZA VIRUS VAC: HCPCS | Performed by: INTERNAL MEDICINE

## 2020-01-01 PROCEDURE — 99214 OFFICE O/P EST MOD 30 MIN: CPT | Performed by: INTERNAL MEDICINE

## 2020-01-01 PROCEDURE — G8484 FLU IMMUNIZE NO ADMIN: HCPCS | Performed by: INTERNAL MEDICINE

## 2020-01-01 RX ORDER — NYSTATIN 100000 [USP'U]/G
POWDER TOPICAL
Qty: 60 G | Refills: 1 | Status: SHIPPED | OUTPATIENT
Start: 2020-01-01

## 2020-01-01 ASSESSMENT — ENCOUNTER SYMPTOMS
CONSTIPATION: 0
TROUBLE SWALLOWING: 0
TROUBLE SWALLOWING: 0
SHORTNESS OF BREATH: 0
BACK PAIN: 0
SHORTNESS OF BREATH: 0
GASTROINTESTINAL NEGATIVE: 1
DIARRHEA: 0

## 2020-01-01 ASSESSMENT — PATIENT HEALTH QUESTIONNAIRE - PHQ9
SUM OF ALL RESPONSES TO PHQ9 QUESTIONS 1 & 2: 0
SUM OF ALL RESPONSES TO PHQ QUESTIONS 1-9: 0
1. LITTLE INTEREST OR PLEASURE IN DOING THINGS: 0
2. FEELING DOWN, DEPRESSED OR HOPELESS: 0
SUM OF ALL RESPONSES TO PHQ QUESTIONS 1-9: 0
SUM OF ALL RESPONSES TO PHQ QUESTIONS 1-9: 0

## 2020-01-01 ASSESSMENT — LIFESTYLE VARIABLES: HOW OFTEN DO YOU HAVE A DRINK CONTAINING ALCOHOL: 0

## 2020-01-13 ENCOUNTER — OFFICE VISIT (OUTPATIENT)
Dept: INTERNAL MEDICINE CLINIC | Age: 80
End: 2020-01-13
Payer: MEDICARE

## 2020-01-13 VITALS
BODY MASS INDEX: 23.34 KG/M2 | SYSTOLIC BLOOD PRESSURE: 120 MMHG | WEIGHT: 163 LBS | HEIGHT: 70 IN | HEART RATE: 80 BPM | DIASTOLIC BLOOD PRESSURE: 68 MMHG

## 2020-01-13 DIAGNOSIS — N28.9 RENAL INSUFFICIENCY: ICD-10-CM

## 2020-01-13 PROBLEM — K40.20 INGUINAL HERNIA, BILATERAL: Status: ACTIVE | Noted: 2019-04-08

## 2020-01-13 PROBLEM — R19.5 LOOSE STOOLS: Status: RESOLVED | Noted: 2019-04-08 | Resolved: 2020-01-13

## 2020-01-13 LAB
ANION GAP SERPL CALCULATED.3IONS-SCNC: 15 MMOL/L (ref 3–16)
BUN BLDV-MCNC: 26 MG/DL (ref 7–20)
CALCIUM SERPL-MCNC: 9.6 MG/DL (ref 8.3–10.6)
CHLORIDE BLD-SCNC: 105 MMOL/L (ref 99–110)
CO2: 22 MMOL/L (ref 21–32)
CREAT SERPL-MCNC: 1.2 MG/DL (ref 0.8–1.3)
GFR AFRICAN AMERICAN: >60
GFR NON-AFRICAN AMERICAN: 58
GLUCOSE BLD-MCNC: 103 MG/DL (ref 70–99)
POTASSIUM SERPL-SCNC: 4.8 MMOL/L (ref 3.5–5.1)
SODIUM BLD-SCNC: 142 MMOL/L (ref 136–145)

## 2020-01-13 PROCEDURE — G8420 CALC BMI NORM PARAMETERS: HCPCS | Performed by: INTERNAL MEDICINE

## 2020-01-13 PROCEDURE — 99214 OFFICE O/P EST MOD 30 MIN: CPT | Performed by: INTERNAL MEDICINE

## 2020-01-13 PROCEDURE — 1036F TOBACCO NON-USER: CPT | Performed by: INTERNAL MEDICINE

## 2020-01-13 PROCEDURE — 90653 IIV ADJUVANT VACCINE IM: CPT | Performed by: INTERNAL MEDICINE

## 2020-01-13 PROCEDURE — 4040F PNEUMOC VAC/ADMIN/RCVD: CPT | Performed by: INTERNAL MEDICINE

## 2020-01-13 PROCEDURE — G8427 DOCREV CUR MEDS BY ELIG CLIN: HCPCS | Performed by: INTERNAL MEDICINE

## 2020-01-13 PROCEDURE — G0008 ADMIN INFLUENZA VIRUS VAC: HCPCS | Performed by: INTERNAL MEDICINE

## 2020-01-13 PROCEDURE — 1123F ACP DISCUSS/DSCN MKR DOCD: CPT | Performed by: INTERNAL MEDICINE

## 2020-01-13 PROCEDURE — G8482 FLU IMMUNIZE ORDER/ADMIN: HCPCS | Performed by: INTERNAL MEDICINE

## 2020-01-13 ASSESSMENT — PATIENT HEALTH QUESTIONNAIRE - PHQ9
SUM OF ALL RESPONSES TO PHQ QUESTIONS 1-9: 0
1. LITTLE INTEREST OR PLEASURE IN DOING THINGS: 0
SUM OF ALL RESPONSES TO PHQ9 QUESTIONS 1 & 2: 0
2. FEELING DOWN, DEPRESSED OR HOPELESS: 0
SUM OF ALL RESPONSES TO PHQ QUESTIONS 1-9: 0

## 2020-01-13 ASSESSMENT — ENCOUNTER SYMPTOMS
GASTROINTESTINAL NEGATIVE: 1
SHORTNESS OF BREATH: 0

## 2020-01-13 NOTE — PROGRESS NOTES
Vaccine Information Sheet, \"Influenza - Inactivated\"  given to Aleena Gregorio, or parent/legal guardian of  Aleena Gregorio and verbalized understanding. Patient responses:    Have you ever had a reaction to a flu vaccine? No  Do you have any current illness? No  Have you ever had Guillian [place] Syndrome? No  Do you have a serious allergy to any of the follow: Neomycin, Polymyxin, Thimerosal, eggs or egg products? No    Flu vaccine given per order. Please see immunization tab. Risks and benefits explained. Current VIS given.
PSH, PMH, SH and FH reviewed and noted. Recent and past labs, tests and consultsalso reviewed. Recent or new meds also reviewed.

## 2020-01-13 NOTE — ASSESSMENT & PLAN NOTE
Last creatinine 1.9. Noted several months ago. Patient was supposed to follow-up after CAT scan last summer, but not seen till now. Recheck BMP today. CT abdomen only showed some nonobstructing renal calculi.

## 2020-01-31 ENCOUNTER — TELEPHONE (OUTPATIENT)
Dept: INTERNAL MEDICINE CLINIC | Age: 80
End: 2020-01-31

## 2020-01-31 RX ORDER — OLMESARTAN MEDOXOMIL 20 MG/1
10 TABLET ORAL NIGHTLY
Qty: 45 TABLET | Refills: 3 | Status: SHIPPED | OUTPATIENT
Start: 2020-01-31 | End: 2021-01-01 | Stop reason: SDUPTHER

## 2020-07-13 NOTE — ASSESSMENT & PLAN NOTE
Weakness of legs, and a sensation that he cannot rely on them to hold him up. However on exam he can generate good strength. Physical therapy helped initially. Refer to physiatry for opinion regarding further testing or therapies.

## 2020-11-18 PROBLEM — Z00.00 PREVENTATIVE HEALTH CARE: Status: ACTIVE | Noted: 2020-01-01

## 2020-11-18 PROBLEM — H01.003 BLEPHARITIS OF BOTH EYES: Status: RESOLVED | Noted: 2017-11-20 | Resolved: 2020-01-01

## 2020-11-18 PROBLEM — H01.006 BLEPHARITIS OF BOTH EYES: Status: RESOLVED | Noted: 2017-11-20 | Resolved: 2020-01-01

## 2020-11-18 NOTE — ASSESSMENT & PLAN NOTE
Flu shot today. Labs from 4 months ago reviewed. Patient declines any other extensive testing. He is staying wheelchair confined because he is afraid of falling.

## 2020-11-18 NOTE — PATIENT INSTRUCTIONS
Personalized Preventive Plan for Gary Patel - 11/18/2020  Medicare offers a range of preventive health benefits. Some of the tests and screenings are paid in full while other may be subject to a deductible, co-insurance, and/or copay. Some of these benefits include a comprehensive review of your medical history including lifestyle, illnesses that may run in your family, and various assessments and screenings as appropriate. After reviewing your medical record and screening and assessments performed today your provider may have ordered immunizations, labs, imaging, and/or referrals for you. A list of these orders (if applicable) as well as your Preventive Care list are included within your After Visit Summary for your review. Other Preventive Recommendations:    · A preventive eye exam performed by an eye specialist is recommended every 1-2 years to screen for glaucoma; cataracts, macular degeneration, and other eye disorders. · A preventive dental visit is recommended every 6 months. · Try to get at least 150 minutes of exercise per week or 10,000 steps per day on a pedometer . · Order or download the FREE \"Exercise & Physical Activity: Your Everyday Guide\" from [employer]. Call 7-513.769.5466 or search [employer] online. · You need 1758-6479 mg of calcium and 5899-5394 IU of vitamin D per day. It is possible to meet your calcium requirement with diet alone, but a vitamin D supplement is usually necessary to meet this goal.  · When exposed to the sun, use a sunscreen that protects against both UVA and UVB radiation with an SPF of 30 or greater. Reapply every 2 to 3 hours or after sweating, drying off with a towel, or swimming. · Always wear a seat belt when traveling in a car. Always wear a helmet when riding a bicycle or motorcycle.

## 2020-11-18 NOTE — ASSESSMENT & PLAN NOTE
Sensation of leg weakness. On exam he generates good strength. He refuses to try to walk-his wife believes because he is afraid of falling. We will try to start therapy again at home. His wife will let us know which agency was involved last time. Offered further evaluation which he declined at this time. Also offered referral to [facility] neurology for movement disorders but they declined this as well.

## 2020-12-18 PROBLEM — Z00.00 PREVENTATIVE HEALTH CARE: Status: RESOLVED | Noted: 2020-01-01 | Resolved: 2020-01-01

## 2021-01-01 ENCOUNTER — APPOINTMENT (OUTPATIENT)
Dept: GENERAL RADIOLOGY | Age: 81
DRG: 592 | End: 2021-01-01
Payer: MEDICARE

## 2021-01-01 ENCOUNTER — TELEPHONE (OUTPATIENT)
Dept: INTERNAL MEDICINE CLINIC | Age: 81
End: 2021-01-01

## 2021-01-01 ENCOUNTER — ANESTHESIA EVENT (OUTPATIENT)
Dept: ENDOSCOPY | Age: 81
DRG: 592 | End: 2021-01-01
Payer: MEDICARE

## 2021-01-01 ENCOUNTER — APPOINTMENT (OUTPATIENT)
Dept: INTERVENTIONAL RADIOLOGY/VASCULAR | Age: 81
DRG: 592 | End: 2021-01-01
Payer: MEDICARE

## 2021-01-01 ENCOUNTER — HOSPITAL ENCOUNTER (INPATIENT)
Age: 81
LOS: 12 days | Discharge: HOSPICE | DRG: 592 | End: 2021-06-29
Attending: HOSPITALIST | Admitting: HOSPITALIST
Payer: MEDICARE

## 2021-01-01 ENCOUNTER — APPOINTMENT (OUTPATIENT)
Dept: CT IMAGING | Age: 81
DRG: 592 | End: 2021-01-01
Payer: MEDICARE

## 2021-01-01 ENCOUNTER — ANESTHESIA (OUTPATIENT)
Dept: ENDOSCOPY | Age: 81
DRG: 592 | End: 2021-01-01
Payer: MEDICARE

## 2021-01-01 VITALS
DIASTOLIC BLOOD PRESSURE: 54 MMHG | RESPIRATION RATE: 22 BRPM | OXYGEN SATURATION: 93 % | SYSTOLIC BLOOD PRESSURE: 97 MMHG

## 2021-01-01 VITALS
HEIGHT: 70 IN | DIASTOLIC BLOOD PRESSURE: 82 MMHG | WEIGHT: 136.9 LBS | RESPIRATION RATE: 20 BRPM | OXYGEN SATURATION: 93 % | HEART RATE: 89 BPM | SYSTOLIC BLOOD PRESSURE: 147 MMHG | BODY MASS INDEX: 19.6 KG/M2 | TEMPERATURE: 98.1 F

## 2021-01-01 DIAGNOSIS — T14.8XXA OPEN WOUND: Primary | ICD-10-CM

## 2021-01-01 DIAGNOSIS — L89.329 PRESSURE INJURY OF SKIN OF LEFT BUTTOCK, UNSPECIFIED INJURY STAGE: Primary | ICD-10-CM

## 2021-01-01 DIAGNOSIS — R26.2 INABILITY TO WALK: ICD-10-CM

## 2021-01-01 DIAGNOSIS — Z98.890 STATUS POST THORACENTESIS: ICD-10-CM

## 2021-01-01 DIAGNOSIS — C32.9 LARYNX CANCER (HCC): Chronic | ICD-10-CM

## 2021-01-01 LAB
A/G RATIO: 1.1 (ref 1.1–2.2)
ALBUMIN SERPL-MCNC: 3.6 G/DL (ref 3.4–5)
ALP BLD-CCNC: 105 U/L (ref 40–129)
ALT SERPL-CCNC: 17 U/L (ref 10–40)
ANION GAP SERPL CALCULATED.3IONS-SCNC: 10 MMOL/L (ref 3–16)
ANION GAP SERPL CALCULATED.3IONS-SCNC: 12 MMOL/L (ref 3–16)
ANION GAP SERPL CALCULATED.3IONS-SCNC: 5 MMOL/L (ref 3–16)
ANION GAP SERPL CALCULATED.3IONS-SCNC: 5 MMOL/L (ref 3–16)
ANION GAP SERPL CALCULATED.3IONS-SCNC: 6 MMOL/L (ref 3–16)
ANION GAP SERPL CALCULATED.3IONS-SCNC: 6 MMOL/L (ref 3–16)
ANION GAP SERPL CALCULATED.3IONS-SCNC: 7 MMOL/L (ref 3–16)
ANION GAP SERPL CALCULATED.3IONS-SCNC: 8 MMOL/L (ref 3–16)
ANION GAP SERPL CALCULATED.3IONS-SCNC: 8 MMOL/L (ref 3–16)
ANION GAP SERPL CALCULATED.3IONS-SCNC: 9 MMOL/L (ref 3–16)
AST SERPL-CCNC: 28 U/L (ref 15–37)
BASE EXCESS ARTERIAL: 6.5 MMOL/L (ref -3–3)
BASOPHILS ABSOLUTE: 0.1 K/UL (ref 0–0.2)
BASOPHILS RELATIVE PERCENT: 0.5 %
BILIRUB SERPL-MCNC: 0.8 MG/DL (ref 0–1)
BILIRUBIN URINE: ABNORMAL
BILIRUBIN URINE: NEGATIVE
BLOOD CULTURE, ROUTINE: NORMAL
BLOOD CULTURE, ROUTINE: NORMAL
BLOOD, URINE: ABNORMAL
BLOOD, URINE: NEGATIVE
BODY FLUID CULTURE, STERILE: NORMAL
BUN BLDV-MCNC: 14 MG/DL (ref 7–20)
BUN BLDV-MCNC: 14 MG/DL (ref 7–20)
BUN BLDV-MCNC: 15 MG/DL (ref 7–20)
BUN BLDV-MCNC: 17 MG/DL (ref 7–20)
BUN BLDV-MCNC: 20 MG/DL (ref 7–20)
BUN BLDV-MCNC: 20 MG/DL (ref 7–20)
BUN BLDV-MCNC: 21 MG/DL (ref 7–20)
BUN BLDV-MCNC: 22 MG/DL (ref 7–20)
BUN BLDV-MCNC: 22 MG/DL (ref 7–20)
BUN BLDV-MCNC: 23 MG/DL (ref 7–20)
BUN BLDV-MCNC: 24 MG/DL (ref 7–20)
BUN BLDV-MCNC: 25 MG/DL (ref 7–20)
BUN BLDV-MCNC: 25 MG/DL (ref 7–20)
C DIFF TOXIN/ANTIGEN: NORMAL
C-REACTIVE PROTEIN: 75.3 MG/L (ref 0–5.1)
CALCIUM SERPL-MCNC: 8.4 MG/DL (ref 8.3–10.6)
CALCIUM SERPL-MCNC: 8.4 MG/DL (ref 8.3–10.6)
CALCIUM SERPL-MCNC: 8.5 MG/DL (ref 8.3–10.6)
CALCIUM SERPL-MCNC: 8.5 MG/DL (ref 8.3–10.6)
CALCIUM SERPL-MCNC: 8.6 MG/DL (ref 8.3–10.6)
CALCIUM SERPL-MCNC: 8.9 MG/DL (ref 8.3–10.6)
CALCIUM SERPL-MCNC: 8.9 MG/DL (ref 8.3–10.6)
CALCIUM SERPL-MCNC: 9 MG/DL (ref 8.3–10.6)
CALCIUM SERPL-MCNC: 9.2 MG/DL (ref 8.3–10.6)
CALCIUM SERPL-MCNC: 9.5 MG/DL (ref 8.3–10.6)
CALCIUM SERPL-MCNC: 9.8 MG/DL (ref 8.3–10.6)
CARBOXYHEMOGLOBIN ARTERIAL: 1 % (ref 0–1.5)
CHLORIDE BLD-SCNC: 100 MMOL/L (ref 99–110)
CHLORIDE BLD-SCNC: 100 MMOL/L (ref 99–110)
CHLORIDE BLD-SCNC: 101 MMOL/L (ref 99–110)
CHLORIDE BLD-SCNC: 103 MMOL/L (ref 99–110)
CHLORIDE BLD-SCNC: 103 MMOL/L (ref 99–110)
CHLORIDE BLD-SCNC: 104 MMOL/L (ref 99–110)
CHLORIDE BLD-SCNC: 105 MMOL/L (ref 99–110)
CHLORIDE BLD-SCNC: 105 MMOL/L (ref 99–110)
CHLORIDE BLD-SCNC: 107 MMOL/L (ref 99–110)
CHLORIDE BLD-SCNC: 97 MMOL/L (ref 99–110)
CHLORIDE BLD-SCNC: 97 MMOL/L (ref 99–110)
CLARITY: ABNORMAL
CLARITY: CLEAR
CO2: 24 MMOL/L (ref 21–32)
CO2: 24 MMOL/L (ref 21–32)
CO2: 25 MMOL/L (ref 21–32)
CO2: 26 MMOL/L (ref 21–32)
CO2: 29 MMOL/L (ref 21–32)
CO2: 31 MMOL/L (ref 21–32)
COLOR: ABNORMAL
COLOR: YELLOW
CREAT SERPL-MCNC: 0.6 MG/DL (ref 0.8–1.3)
CREAT SERPL-MCNC: 0.7 MG/DL (ref 0.8–1.3)
CREAT SERPL-MCNC: 0.8 MG/DL (ref 0.8–1.3)
CREAT SERPL-MCNC: 0.9 MG/DL (ref 0.8–1.3)
CULTURE, BLOOD 2: NORMAL
CULTURE, BLOOD 2: NORMAL
CULTURE, RESPIRATORY: NORMAL
EKG ATRIAL RATE: 101 BPM
EKG DIAGNOSIS: NORMAL
EKG P AXIS: 86 DEGREES
EKG P-R INTERVAL: 192 MS
EKG Q-T INTERVAL: 382 MS
EKG QRS DURATION: 146 MS
EKG QTC CALCULATION (BAZETT): 495 MS
EKG R AXIS: 67 DEGREES
EKG T AXIS: 11 DEGREES
EKG VENTRICULAR RATE: 101 BPM
EOSINOPHILS ABSOLUTE: 0.1 K/UL (ref 0–0.6)
EOSINOPHILS RELATIVE PERCENT: 0.9 %
EPITHELIAL CELLS, UA: 1 /HPF (ref 0–5)
EPITHELIAL CELLS, UA: NORMAL /HPF (ref 0–5)
FLUID TYPE: NORMAL
FOLATE: 9.83 NG/ML (ref 4.78–24.2)
GFR AFRICAN AMERICAN: >60
GFR NON-AFRICAN AMERICAN: >60
GLOBULIN: 3.4 G/DL
GLUCOSE BLD-MCNC: 102 MG/DL (ref 70–99)
GLUCOSE BLD-MCNC: 103 MG/DL (ref 70–99)
GLUCOSE BLD-MCNC: 103 MG/DL (ref 70–99)
GLUCOSE BLD-MCNC: 109 MG/DL (ref 70–99)
GLUCOSE BLD-MCNC: 110 MG/DL (ref 70–99)
GLUCOSE BLD-MCNC: 111 MG/DL (ref 70–99)
GLUCOSE BLD-MCNC: 114 MG/DL (ref 70–99)
GLUCOSE BLD-MCNC: 115 MG/DL (ref 70–99)
GLUCOSE BLD-MCNC: 117 MG/DL (ref 70–99)
GLUCOSE BLD-MCNC: 119 MG/DL (ref 70–99)
GLUCOSE BLD-MCNC: 120 MG/DL (ref 70–99)
GLUCOSE BLD-MCNC: 121 MG/DL (ref 70–99)
GLUCOSE BLD-MCNC: 123 MG/DL (ref 70–99)
GLUCOSE BLD-MCNC: 123 MG/DL (ref 70–99)
GLUCOSE BLD-MCNC: 126 MG/DL (ref 70–99)
GLUCOSE BLD-MCNC: 127 MG/DL (ref 70–99)
GLUCOSE BLD-MCNC: 128 MG/DL (ref 70–99)
GLUCOSE BLD-MCNC: 132 MG/DL (ref 70–99)
GLUCOSE BLD-MCNC: 162 MG/DL (ref 70–99)
GLUCOSE BLD-MCNC: 164 MG/DL (ref 70–99)
GLUCOSE URINE: NEGATIVE MG/DL
GLUCOSE URINE: NEGATIVE MG/DL
GLUCOSE, FLUID: 146 MG/DL
GRAM STAIN RESULT: ABNORMAL
GRAM STAIN RESULT: ABNORMAL
GRAM STAIN RESULT: NORMAL
GRAM STAIN RESULT: NORMAL
HCO3 ARTERIAL: 31.8 MMOL/L (ref 21–29)
HCT VFR BLD CALC: 27.8 % (ref 40.5–52.5)
HCT VFR BLD CALC: 28.8 % (ref 40.5–52.5)
HCT VFR BLD CALC: 28.9 % (ref 40.5–52.5)
HCT VFR BLD CALC: 28.9 % (ref 40.5–52.5)
HCT VFR BLD CALC: 29.2 % (ref 40.5–52.5)
HCT VFR BLD CALC: 29.6 % (ref 40.5–52.5)
HCT VFR BLD CALC: 30.3 % (ref 40.5–52.5)
HCT VFR BLD CALC: 30.6 % (ref 40.5–52.5)
HCT VFR BLD CALC: 31.9 % (ref 40.5–52.5)
HCT VFR BLD CALC: 32.3 % (ref 40.5–52.5)
HCT VFR BLD CALC: 32.5 % (ref 40.5–52.5)
HCT VFR BLD CALC: 34.5 % (ref 40.5–52.5)
HCT VFR BLD CALC: 34.6 % (ref 40.5–52.5)
HEMOGLOBIN, ART, EXTENDED: 10.2 G/DL (ref 13.5–17.5)
HEMOGLOBIN: 10 G/DL (ref 13.5–17.5)
HEMOGLOBIN: 10 G/DL (ref 13.5–17.5)
HEMOGLOBIN: 10.1 G/DL (ref 13.5–17.5)
HEMOGLOBIN: 10.7 G/DL (ref 13.5–17.5)
HEMOGLOBIN: 10.7 G/DL (ref 13.5–17.5)
HEMOGLOBIN: 10.8 G/DL (ref 13.5–17.5)
HEMOGLOBIN: 11.5 G/DL (ref 13.5–17.5)
HEMOGLOBIN: 11.6 G/DL (ref 13.5–17.5)
HEMOGLOBIN: 9.3 G/DL (ref 13.5–17.5)
HEMOGLOBIN: 9.6 G/DL (ref 13.5–17.5)
HEMOGLOBIN: 9.6 G/DL (ref 13.5–17.5)
HEMOGLOBIN: 9.7 G/DL (ref 13.5–17.5)
HEMOGLOBIN: 9.9 G/DL (ref 13.5–17.5)
HYALINE CASTS: 0 /LPF (ref 0–8)
INR BLD: 1.1 (ref 0.86–1.14)
KETONES, URINE: ABNORMAL MG/DL
KETONES, URINE: ABNORMAL MG/DL
L. PNEUMOPHILA SEROGP 1 UR AG: NORMAL
LACTIC ACID, SEPSIS: 1.9 MMOL/L (ref 0.4–1.9)
LD, FLUID: 118 U/L
LEUKOCYTE ESTERASE, URINE: NEGATIVE
LEUKOCYTE ESTERASE, URINE: NEGATIVE
LYMPHOCYTES ABSOLUTE: 1.1 K/UL (ref 1–5.1)
LYMPHOCYTES RELATIVE PERCENT: 7.5 %
MCH RBC QN AUTO: 29.1 PG (ref 26–34)
MCH RBC QN AUTO: 29.3 PG (ref 26–34)
MCH RBC QN AUTO: 29.4 PG (ref 26–34)
MCH RBC QN AUTO: 29.5 PG (ref 26–34)
MCH RBC QN AUTO: 29.5 PG (ref 26–34)
MCH RBC QN AUTO: 29.6 PG (ref 26–34)
MCH RBC QN AUTO: 29.7 PG (ref 26–34)
MCH RBC QN AUTO: 29.9 PG (ref 26–34)
MCH RBC QN AUTO: 30.5 PG (ref 26–34)
MCHC RBC AUTO-ENTMCNC: 32.8 G/DL (ref 31–36)
MCHC RBC AUTO-ENTMCNC: 33.1 G/DL (ref 31–36)
MCHC RBC AUTO-ENTMCNC: 33.3 G/DL (ref 31–36)
MCHC RBC AUTO-ENTMCNC: 33.4 G/DL (ref 31–36)
MCHC RBC AUTO-ENTMCNC: 33.5 G/DL (ref 31–36)
MCHC RBC AUTO-ENTMCNC: 33.6 G/DL (ref 31–36)
MCHC RBC AUTO-ENTMCNC: 33.7 G/DL (ref 31–36)
MCHC RBC AUTO-ENTMCNC: 33.8 G/DL (ref 31–36)
MCHC RBC AUTO-ENTMCNC: 33.8 G/DL (ref 31–36)
MCV RBC AUTO: 87.1 FL (ref 80–100)
MCV RBC AUTO: 87.1 FL (ref 80–100)
MCV RBC AUTO: 87.4 FL (ref 80–100)
MCV RBC AUTO: 87.8 FL (ref 80–100)
MCV RBC AUTO: 88.2 FL (ref 80–100)
MCV RBC AUTO: 88.3 FL (ref 80–100)
MCV RBC AUTO: 88.5 FL (ref 80–100)
MCV RBC AUTO: 88.5 FL (ref 80–100)
MCV RBC AUTO: 88.6 FL (ref 80–100)
MCV RBC AUTO: 88.7 FL (ref 80–100)
MCV RBC AUTO: 89.1 FL (ref 80–100)
MCV RBC AUTO: 90 FL (ref 80–100)
MCV RBC AUTO: 90.6 FL (ref 80–100)
METHEMOGLOBIN ARTERIAL: 0 %
MICROSCOPIC EXAMINATION: YES
MICROSCOPIC EXAMINATION: YES
MONOCYTES ABSOLUTE: 1.2 K/UL (ref 0–1.3)
MONOCYTES RELATIVE PERCENT: 8.3 %
MRSA SCREEN RT-PCR: NORMAL
NEUTROPHILS ABSOLUTE: 12 K/UL (ref 1.7–7.7)
NEUTROPHILS RELATIVE PERCENT: 82.8 %
NITRITE, URINE: NEGATIVE
NITRITE, URINE: NEGATIVE
O2 CONTENT ARTERIAL: 14 ML/DL
O2 SAT, ARTERIAL: 97.1 %
O2 THERAPY: ABNORMAL
PCO2 ARTERIAL: 48.3 MMHG (ref 35–45)
PDW BLD-RTO: 12.8 % (ref 12.4–15.4)
PDW BLD-RTO: 12.9 % (ref 12.4–15.4)
PDW BLD-RTO: 13 % (ref 12.4–15.4)
PDW BLD-RTO: 13.1 % (ref 12.4–15.4)
PDW BLD-RTO: 13.2 % (ref 12.4–15.4)
PERFORMED ON: ABNORMAL
PH ARTERIAL: 7.43 (ref 7.35–7.45)
PH UA: 5 (ref 5–8)
PH UA: 5.5 (ref 5–8)
PHOSPHORUS: 2.9 MG/DL (ref 2.5–4.9)
PLATELET # BLD: 273 K/UL (ref 135–450)
PLATELET # BLD: 284 K/UL (ref 135–450)
PLATELET # BLD: 291 K/UL (ref 135–450)
PLATELET # BLD: 307 K/UL (ref 135–450)
PLATELET # BLD: 313 K/UL (ref 135–450)
PLATELET # BLD: 324 K/UL (ref 135–450)
PLATELET # BLD: 325 K/UL (ref 135–450)
PLATELET # BLD: 329 K/UL (ref 135–450)
PLATELET # BLD: 379 K/UL (ref 135–450)
PLATELET # BLD: 403 K/UL (ref 135–450)
PLATELET # BLD: 406 K/UL (ref 135–450)
PLATELET # BLD: 433 K/UL (ref 135–450)
PLATELET # BLD: 438 K/UL (ref 135–450)
PMV BLD AUTO: 8 FL (ref 5–10.5)
PMV BLD AUTO: 8.1 FL (ref 5–10.5)
PMV BLD AUTO: 8.3 FL (ref 5–10.5)
PMV BLD AUTO: 8.4 FL (ref 5–10.5)
PMV BLD AUTO: 8.7 FL (ref 5–10.5)
PMV BLD AUTO: 8.8 FL (ref 5–10.5)
PMV BLD AUTO: 8.8 FL (ref 5–10.5)
PMV BLD AUTO: 8.9 FL (ref 5–10.5)
PMV BLD AUTO: 9 FL (ref 5–10.5)
PMV BLD AUTO: 9 FL (ref 5–10.5)
PMV BLD AUTO: 9.1 FL (ref 5–10.5)
PMV BLD AUTO: 9.2 FL (ref 5–10.5)
PMV BLD AUTO: 9.5 FL (ref 5–10.5)
PO2 ARTERIAL: 81.5 MMHG (ref 75–108)
POTASSIUM SERPL-SCNC: 3.6 MMOL/L (ref 3.5–5.1)
POTASSIUM SERPL-SCNC: 3.6 MMOL/L (ref 3.5–5.1)
POTASSIUM SERPL-SCNC: 3.8 MMOL/L (ref 3.5–5.1)
POTASSIUM SERPL-SCNC: 3.8 MMOL/L (ref 3.5–5.1)
POTASSIUM SERPL-SCNC: 3.9 MMOL/L (ref 3.5–5.1)
POTASSIUM SERPL-SCNC: 3.9 MMOL/L (ref 3.5–5.1)
POTASSIUM SERPL-SCNC: 4 MMOL/L (ref 3.5–5.1)
POTASSIUM SERPL-SCNC: 4 MMOL/L (ref 3.5–5.1)
POTASSIUM SERPL-SCNC: 4.1 MMOL/L (ref 3.5–5.1)
POTASSIUM SERPL-SCNC: 4.2 MMOL/L (ref 3.5–5.1)
POTASSIUM SERPL-SCNC: 4.2 MMOL/L (ref 3.5–5.1)
POTASSIUM SERPL-SCNC: 4.3 MMOL/L (ref 3.5–5.1)
POTASSIUM SERPL-SCNC: 4.7 MMOL/L (ref 3.5–5.1)
PRO-BNP: 520 PG/ML (ref 0–449)
PRO-BNP: ABNORMAL PG/ML (ref 0–449)
PROTEIN FLUID: 1.5 G/DL
PROTEIN UA: ABNORMAL MG/DL
PROTEIN UA: NEGATIVE MG/DL
PROTHROMBIN TIME: 12.8 SEC (ref 10–13.2)
RBC # BLD: 3.18 M/UL (ref 4.2–5.9)
RBC # BLD: 3.2 M/UL (ref 4.2–5.9)
RBC # BLD: 3.27 M/UL (ref 4.2–5.9)
RBC # BLD: 3.29 M/UL (ref 4.2–5.9)
RBC # BLD: 3.35 M/UL (ref 4.2–5.9)
RBC # BLD: 3.4 M/UL (ref 4.2–5.9)
RBC # BLD: 3.43 M/UL (ref 4.2–5.9)
RBC # BLD: 3.45 M/UL (ref 4.2–5.9)
RBC # BLD: 3.6 M/UL (ref 4.2–5.9)
RBC # BLD: 3.62 M/UL (ref 4.2–5.9)
RBC # BLD: 3.68 M/UL (ref 4.2–5.9)
RBC # BLD: 3.82 M/UL (ref 4.2–5.9)
RBC # BLD: 3.89 M/UL (ref 4.2–5.9)
RBC UA: 37 /HPF (ref 0–4)
RBC UA: NORMAL /HPF (ref 0–4)
SEDIMENTATION RATE, ERYTHROCYTE: 34 MM/HR (ref 0–20)
SODIUM BLD-SCNC: 133 MMOL/L (ref 136–145)
SODIUM BLD-SCNC: 134 MMOL/L (ref 136–145)
SODIUM BLD-SCNC: 136 MMOL/L (ref 136–145)
SODIUM BLD-SCNC: 138 MMOL/L (ref 136–145)
SODIUM BLD-SCNC: 139 MMOL/L (ref 136–145)
SODIUM BLD-SCNC: 140 MMOL/L (ref 136–145)
SPECIFIC GRAVITY UA: 1.02 (ref 1–1.03)
SPECIFIC GRAVITY UA: 1.02 (ref 1–1.03)
STREP PNEUMONIAE ANTIGEN, URINE: NORMAL
TCO2 ARTERIAL: 74.6 MMOL/L
TOTAL PROTEIN: 7 G/DL (ref 6.4–8.2)
TROPONIN: <0.01 NG/ML
TSH REFLEX: 1.33 UIU/ML (ref 0.27–4.2)
URINE REFLEX TO CULTURE: ABNORMAL
URINE TYPE: ABNORMAL
URINE TYPE: ABNORMAL
UROBILINOGEN, URINE: 0.2 E.U./DL
UROBILINOGEN, URINE: 1 E.U./DL
VITAMIN B-12: 368 PG/ML (ref 211–911)
WBC # BLD: 11.4 K/UL (ref 4–11)
WBC # BLD: 11.7 K/UL (ref 4–11)
WBC # BLD: 12.4 K/UL (ref 4–11)
WBC # BLD: 13.4 K/UL (ref 4–11)
WBC # BLD: 13.7 K/UL (ref 4–11)
WBC # BLD: 14.3 K/UL (ref 4–11)
WBC # BLD: 14.3 K/UL (ref 4–11)
WBC # BLD: 14.6 K/UL (ref 4–11)
WBC # BLD: 14.7 K/UL (ref 4–11)
WBC # BLD: 15.6 K/UL (ref 4–11)
WBC # BLD: 16.5 K/UL (ref 4–11)
WBC # BLD: 16.7 K/UL (ref 4–11)
WBC # BLD: 20 K/UL (ref 4–11)
WBC UA: 2 /HPF (ref 0–5)
WBC UA: NORMAL /HPF (ref 0–5)
WOUND/ABSCESS: ABNORMAL
WOUND/ABSCESS: ABNORMAL

## 2021-01-01 PROCEDURE — 94761 N-INVAS EAR/PLS OXIMETRY MLT: CPT

## 2021-01-01 PROCEDURE — 85027 COMPLETE CBC AUTOMATED: CPT

## 2021-01-01 PROCEDURE — 2580000003 HC RX 258: Performed by: HOSPITALIST

## 2021-01-01 PROCEDURE — 72192 CT PELVIS W/O DYE: CPT

## 2021-01-01 PROCEDURE — 99285 EMERGENCY DEPT VISIT HI MDM: CPT

## 2021-01-01 PROCEDURE — 87015 SPECIMEN INFECT AGNT CONCNTJ: CPT

## 2021-01-01 PROCEDURE — 80048 BASIC METABOLIC PNL TOTAL CA: CPT

## 2021-01-01 PROCEDURE — 82607 VITAMIN B-12: CPT

## 2021-01-01 PROCEDURE — 87205 SMEAR GRAM STAIN: CPT

## 2021-01-01 PROCEDURE — 2580000003 HC RX 258: Performed by: INTERNAL MEDICINE

## 2021-01-01 PROCEDURE — 90715 TDAP VACCINE 7 YRS/> IM: CPT | Performed by: INTERNAL MEDICINE

## 2021-01-01 PROCEDURE — 94640 AIRWAY INHALATION TREATMENT: CPT

## 2021-01-01 PROCEDURE — 87206 SMEAR FLUORESCENT/ACID STAI: CPT

## 2021-01-01 PROCEDURE — 99223 1ST HOSP IP/OBS HIGH 75: CPT | Performed by: INTERNAL MEDICINE

## 2021-01-01 PROCEDURE — 87070 CULTURE OTHR SPECIMN AEROBIC: CPT

## 2021-01-01 PROCEDURE — 71260 CT THORAX DX C+: CPT

## 2021-01-01 PROCEDURE — 6360000002 HC RX W HCPCS: Performed by: HOSPITALIST

## 2021-01-01 PROCEDURE — 2709999900 HC NON-CHARGEABLE SUPPLY: Performed by: INTERNAL MEDICINE

## 2021-01-01 PROCEDURE — 6370000000 HC RX 637 (ALT 250 FOR IP): Performed by: HOSPITALIST

## 2021-01-01 PROCEDURE — 2700000000 HC OXYGEN THERAPY PER DAY

## 2021-01-01 PROCEDURE — 1200000000 HC SEMI PRIVATE

## 2021-01-01 PROCEDURE — 85025 COMPLETE CBC W/AUTO DIFF WBC: CPT

## 2021-01-01 PROCEDURE — 99233 SBSQ HOSP IP/OBS HIGH 50: CPT | Performed by: INTERNAL MEDICINE

## 2021-01-01 PROCEDURE — 88112 CYTOPATH CELL ENHANCE TECH: CPT

## 2021-01-01 PROCEDURE — 6370000000 HC RX 637 (ALT 250 FOR IP): Performed by: INTERNAL MEDICINE

## 2021-01-01 PROCEDURE — 90471 IMMUNIZATION ADMIN: CPT | Performed by: INTERNAL MEDICINE

## 2021-01-01 PROCEDURE — 3700000001 HC ADD 15 MINUTES (ANESTHESIA): Performed by: INTERNAL MEDICINE

## 2021-01-01 PROCEDURE — 6360000002 HC RX W HCPCS: Performed by: INTERNAL MEDICINE

## 2021-01-01 PROCEDURE — C1729 CATH, DRAINAGE: HCPCS

## 2021-01-01 PROCEDURE — 36415 COLL VENOUS BLD VENIPUNCTURE: CPT

## 2021-01-01 PROCEDURE — 87102 FUNGUS ISOLATION CULTURE: CPT

## 2021-01-01 PROCEDURE — 97162 PT EVAL MOD COMPLEX 30 MIN: CPT

## 2021-01-01 PROCEDURE — 6360000004 HC RX CONTRAST MEDICATION: Performed by: INTERNAL MEDICINE

## 2021-01-01 PROCEDURE — 71045 X-RAY EXAM CHEST 1 VIEW: CPT

## 2021-01-01 PROCEDURE — 93005 ELECTROCARDIOGRAM TRACING: CPT | Performed by: HOSPITALIST

## 2021-01-01 PROCEDURE — 36600 WITHDRAWAL OF ARTERIAL BLOOD: CPT

## 2021-01-01 PROCEDURE — 2500000003 HC RX 250 WO HCPCS: Performed by: INTERNAL MEDICINE

## 2021-01-01 PROCEDURE — 72220 X-RAY EXAM SACRUM TAILBONE: CPT

## 2021-01-01 PROCEDURE — 85652 RBC SED RATE AUTOMATED: CPT

## 2021-01-01 PROCEDURE — 86140 C-REACTIVE PROTEIN: CPT

## 2021-01-01 PROCEDURE — 88305 TISSUE EXAM BY PATHOLOGIST: CPT

## 2021-01-01 PROCEDURE — 97530 THERAPEUTIC ACTIVITIES: CPT

## 2021-01-01 PROCEDURE — 92526 ORAL FUNCTION THERAPY: CPT

## 2021-01-01 PROCEDURE — 84484 ASSAY OF TROPONIN QUANT: CPT

## 2021-01-01 PROCEDURE — 85610 PROTHROMBIN TIME: CPT

## 2021-01-01 PROCEDURE — 97535 SELF CARE MNGMENT TRAINING: CPT

## 2021-01-01 PROCEDURE — 31645 BRNCHSC W/THER ASPIR 1ST: CPT | Performed by: INTERNAL MEDICINE

## 2021-01-01 PROCEDURE — 73560 X-RAY EXAM OF KNEE 1 OR 2: CPT

## 2021-01-01 PROCEDURE — 74230 X-RAY XM SWLNG FUNCJ C+: CPT

## 2021-01-01 PROCEDURE — 87116 MYCOBACTERIA CULTURE: CPT

## 2021-01-01 PROCEDURE — 83880 ASSAY OF NATRIURETIC PEPTIDE: CPT

## 2021-01-01 PROCEDURE — 82803 BLOOD GASES ANY COMBINATION: CPT

## 2021-01-01 PROCEDURE — 87641 MR-STAPH DNA AMP PROBE: CPT

## 2021-01-01 PROCEDURE — 84100 ASSAY OF PHOSPHORUS: CPT

## 2021-01-01 PROCEDURE — 82945 GLUCOSE OTHER FLUID: CPT

## 2021-01-01 PROCEDURE — 80053 COMPREHEN METABOLIC PANEL: CPT

## 2021-01-01 PROCEDURE — 2580000003 HC RX 258: Performed by: NURSE ANESTHETIST, CERTIFIED REGISTERED

## 2021-01-01 PROCEDURE — 94667 MNPJ CHEST WALL 1ST: CPT

## 2021-01-01 PROCEDURE — 7100000001 HC PACU RECOVERY - ADDTL 15 MIN: Performed by: INTERNAL MEDICINE

## 2021-01-01 PROCEDURE — 81001 URINALYSIS AUTO W/SCOPE: CPT

## 2021-01-01 PROCEDURE — 93010 ELECTROCARDIOGRAM REPORT: CPT | Performed by: INTERNAL MEDICINE

## 2021-01-01 PROCEDURE — 6360000002 HC RX W HCPCS: Performed by: NURSE ANESTHETIST, CERTIFIED REGISTERED

## 2021-01-01 PROCEDURE — BD11YZZ FLUOROSCOPY OF ESOPHAGUS USING OTHER CONTRAST: ICD-10-PCS | Performed by: INTERNAL MEDICINE

## 2021-01-01 PROCEDURE — 0BJ08ZZ INSPECTION OF TRACHEOBRONCHIAL TREE, VIA NATURAL OR ARTIFICIAL OPENING ENDOSCOPIC: ICD-10-PCS | Performed by: INTERNAL MEDICINE

## 2021-01-01 PROCEDURE — 97166 OT EVAL MOD COMPLEX 45 MIN: CPT

## 2021-01-01 PROCEDURE — 87040 BLOOD CULTURE FOR BACTERIA: CPT

## 2021-01-01 PROCEDURE — 92611 MOTION FLUOROSCOPY/SWALLOW: CPT

## 2021-01-01 PROCEDURE — 83615 LACTATE (LD) (LDH) ENZYME: CPT

## 2021-01-01 PROCEDURE — 84443 ASSAY THYROID STIM HORMONE: CPT

## 2021-01-01 PROCEDURE — 92610 EVALUATE SWALLOWING FUNCTION: CPT

## 2021-01-01 PROCEDURE — 2500000003 HC RX 250 WO HCPCS: Performed by: NURSE ANESTHETIST, CERTIFIED REGISTERED

## 2021-01-01 PROCEDURE — 87324 CLOSTRIDIUM AG IA: CPT

## 2021-01-01 PROCEDURE — 83605 ASSAY OF LACTIC ACID: CPT

## 2021-01-01 PROCEDURE — 3609010900 HC BRONCHOSCOPY THERAPUTIC ASPIRATION INITIAL: Performed by: INTERNAL MEDICINE

## 2021-01-01 PROCEDURE — 84157 ASSAY OF PROTEIN OTHER: CPT

## 2021-01-01 PROCEDURE — 7100000000 HC PACU RECOVERY - FIRST 15 MIN: Performed by: INTERNAL MEDICINE

## 2021-01-01 PROCEDURE — 0W993ZZ DRAINAGE OF RIGHT PLEURAL CAVITY, PERCUTANEOUS APPROACH: ICD-10-PCS | Performed by: INTERNAL MEDICINE

## 2021-01-01 PROCEDURE — 32555 ASPIRATE PLEURA W/ IMAGING: CPT

## 2021-01-01 PROCEDURE — 82746 ASSAY OF FOLIC ACID SERUM: CPT

## 2021-01-01 PROCEDURE — 76604 US EXAM CHEST: CPT | Performed by: INTERNAL MEDICINE

## 2021-01-01 PROCEDURE — 94760 N-INVAS EAR/PLS OXIMETRY 1: CPT

## 2021-01-01 PROCEDURE — 3700000000 HC ANESTHESIA ATTENDED CARE: Performed by: INTERNAL MEDICINE

## 2021-01-01 PROCEDURE — 87449 NOS EACH ORGANISM AG IA: CPT

## 2021-01-01 RX ORDER — LORAZEPAM 2 MG/ML
1 CONCENTRATE ORAL EVERY 4 HOURS PRN
Qty: 30 ML | Refills: 0 | Status: SHIPPED | OUTPATIENT
Start: 2021-01-01 | End: 2021-07-13

## 2021-01-01 RX ORDER — OLMESARTAN MEDOXOMIL 20 MG/1
10 TABLET ORAL NIGHTLY
Qty: 45 TABLET | Refills: 3 | Status: SHIPPED | OUTPATIENT
Start: 2021-01-01

## 2021-01-01 RX ORDER — CYCLOBENZAPRINE HCL 10 MG
5 TABLET ORAL 3 TIMES DAILY PRN
Status: DISCONTINUED | OUTPATIENT
Start: 2021-01-01 | End: 2021-01-01 | Stop reason: HOSPADM

## 2021-01-01 RX ORDER — ACETAMINOPHEN 325 MG/1
650 TABLET ORAL EVERY 6 HOURS PRN
Status: DISCONTINUED | OUTPATIENT
Start: 2021-01-01 | End: 2021-01-01 | Stop reason: HOSPADM

## 2021-01-01 RX ORDER — TAMSULOSIN HYDROCHLORIDE 0.4 MG/1
0.4 CAPSULE ORAL DAILY
Qty: 30 CAPSULE | Refills: 0 | Status: SHIPPED | OUTPATIENT
Start: 2021-01-01

## 2021-01-01 RX ORDER — SULFAMETHOXAZOLE AND TRIMETHOPRIM 800; 160 MG/1; MG/1
1 TABLET ORAL EVERY 12 HOURS SCHEDULED
Status: DISCONTINUED | OUTPATIENT
Start: 2021-01-01 | End: 2021-01-01

## 2021-01-01 RX ORDER — MORPHINE SULFATE 100 MG/5ML
10 SOLUTION ORAL
Qty: 30 ML | Refills: 0 | Status: SHIPPED | OUTPATIENT
Start: 2021-01-01 | End: 2021-07-04

## 2021-01-01 RX ORDER — SODIUM CHLORIDE 9 MG/ML
INJECTION, SOLUTION INTRAVENOUS CONTINUOUS
Status: DISCONTINUED | OUTPATIENT
Start: 2021-01-01 | End: 2021-01-01

## 2021-01-01 RX ORDER — LIDOCAINE HYDROCHLORIDE 20 MG/ML
INJECTION, SOLUTION EPIDURAL; INFILTRATION; INTRACAUDAL; PERINEURAL PRN
Status: DISCONTINUED | OUTPATIENT
Start: 2021-01-01 | End: 2021-01-01 | Stop reason: SDUPTHER

## 2021-01-01 RX ORDER — AMOXICILLIN AND CLAVULANATE POTASSIUM 875; 125 MG/1; MG/1
1 TABLET, FILM COATED ORAL EVERY 12 HOURS SCHEDULED
Qty: 10 TABLET | Refills: 0 | Status: SHIPPED | OUTPATIENT
Start: 2021-01-01 | End: 2021-07-04

## 2021-01-01 RX ORDER — FUROSEMIDE 10 MG/ML
40 INJECTION INTRAMUSCULAR; INTRAVENOUS ONCE
Status: COMPLETED | OUTPATIENT
Start: 2021-01-01 | End: 2021-01-01

## 2021-01-01 RX ORDER — SCOLOPAMINE TRANSDERMAL SYSTEM 1 MG/1
1 PATCH, EXTENDED RELEASE TRANSDERMAL
Qty: 4 PATCH | Refills: 0 | Status: SHIPPED | OUTPATIENT
Start: 2021-01-01

## 2021-01-01 RX ORDER — CIPROFLOXACIN 500 MG/1
500 TABLET, FILM COATED ORAL 2 TIMES DAILY
Qty: 20 TABLET | Refills: 0 | Status: SHIPPED
Start: 2021-01-01 | End: 2021-01-01 | Stop reason: HOSPADM

## 2021-01-01 RX ORDER — LIDOCAINE HYDROCHLORIDE 20 MG/ML
JELLY TOPICAL PRN
Status: DISCONTINUED | OUTPATIENT
Start: 2021-01-01 | End: 2021-01-01 | Stop reason: ALTCHOICE

## 2021-01-01 RX ORDER — FENTANYL CITRATE 50 UG/ML
INJECTION, SOLUTION INTRAMUSCULAR; INTRAVENOUS PRN
Status: DISCONTINUED | OUTPATIENT
Start: 2021-01-01 | End: 2021-01-01 | Stop reason: SDUPTHER

## 2021-01-01 RX ORDER — SCOLOPAMINE TRANSDERMAL SYSTEM 1 MG/1
1 PATCH, EXTENDED RELEASE TRANSDERMAL
Status: DISCONTINUED | OUTPATIENT
Start: 2021-01-01 | End: 2021-01-01 | Stop reason: HOSPADM

## 2021-01-01 RX ORDER — TAMSULOSIN HYDROCHLORIDE 0.4 MG/1
0.4 CAPSULE ORAL DAILY
Qty: 30 CAPSULE | Refills: 0
Start: 2021-01-01 | End: 2021-01-01

## 2021-01-01 RX ORDER — ACETAMINOPHEN 650 MG/1
650 SUPPOSITORY RECTAL EVERY 6 HOURS PRN
Status: DISCONTINUED | OUTPATIENT
Start: 2021-01-01 | End: 2021-01-01 | Stop reason: HOSPADM

## 2021-01-01 RX ORDER — PROPOFOL 10 MG/ML
INJECTION, EMULSION INTRAVENOUS PRN
Status: DISCONTINUED | OUTPATIENT
Start: 2021-01-01 | End: 2021-01-01 | Stop reason: SDUPTHER

## 2021-01-01 RX ORDER — SODIUM CHLORIDE 0.9 % (FLUSH) 0.9 %
5-40 SYRINGE (ML) INJECTION EVERY 12 HOURS SCHEDULED
Status: DISCONTINUED | OUTPATIENT
Start: 2021-01-01 | End: 2021-01-01 | Stop reason: HOSPADM

## 2021-01-01 RX ORDER — LACTOBACILLUS RHAMNOSUS GG 10B CELL
1 CAPSULE ORAL 2 TIMES DAILY WITH MEALS
Status: DISCONTINUED | OUTPATIENT
Start: 2021-01-01 | End: 2021-01-01 | Stop reason: HOSPADM

## 2021-01-01 RX ORDER — MORPHINE SULFATE 2 MG/ML
2 INJECTION, SOLUTION INTRAMUSCULAR; INTRAVENOUS EVERY 4 HOURS PRN
Status: DISCONTINUED | OUTPATIENT
Start: 2021-01-01 | End: 2021-01-01 | Stop reason: HOSPADM

## 2021-01-01 RX ORDER — GREEN TEA/HOODIA GORDONII 315-12.5MG
1 CAPSULE ORAL 2 TIMES DAILY
Qty: 20 TABLET | Refills: 0 | Status: SHIPPED | OUTPATIENT
Start: 2021-01-01 | End: 2021-07-03

## 2021-01-01 RX ORDER — CYCLOBENZAPRINE HCL 5 MG
5 TABLET ORAL 3 TIMES DAILY PRN
Qty: 30 TABLET | Refills: 0
Start: 2021-01-01 | End: 2021-01-01 | Stop reason: HOSPADM

## 2021-01-01 RX ORDER — CASTOR OIL AND BALSAM, PERU 788; 87 MG/G; MG/G
OINTMENT TOPICAL 2 TIMES DAILY
Status: DISCONTINUED | OUTPATIENT
Start: 2021-01-01 | End: 2021-01-01 | Stop reason: HOSPADM

## 2021-01-01 RX ORDER — POLYETHYLENE GLYCOL 3350 17 G/17G
17 POWDER, FOR SOLUTION ORAL DAILY PRN
Status: DISCONTINUED | OUTPATIENT
Start: 2021-01-01 | End: 2021-01-01 | Stop reason: HOSPADM

## 2021-01-01 RX ORDER — SODIUM CHLORIDE 9 MG/ML
25 INJECTION, SOLUTION INTRAVENOUS PRN
Status: DISCONTINUED | OUTPATIENT
Start: 2021-01-01 | End: 2021-01-01 | Stop reason: HOSPADM

## 2021-01-01 RX ORDER — ONDANSETRON 2 MG/ML
4 INJECTION INTRAMUSCULAR; INTRAVENOUS EVERY 6 HOURS PRN
Status: DISCONTINUED | OUTPATIENT
Start: 2021-01-01 | End: 2021-01-01 | Stop reason: HOSPADM

## 2021-01-01 RX ORDER — LIDOCAINE HYDROCHLORIDE 10 MG/ML
5 INJECTION, SOLUTION EPIDURAL; INFILTRATION; INTRACAUDAL; PERINEURAL ONCE
Status: COMPLETED | OUTPATIENT
Start: 2021-01-01 | End: 2021-01-01

## 2021-01-01 RX ORDER — LOSARTAN POTASSIUM 25 MG/1
25 TABLET ORAL DAILY
Status: DISCONTINUED | OUTPATIENT
Start: 2021-01-01 | End: 2021-01-01 | Stop reason: HOSPADM

## 2021-01-01 RX ORDER — ONDANSETRON 4 MG/1
4 TABLET, ORALLY DISINTEGRATING ORAL EVERY 8 HOURS PRN
Status: DISCONTINUED | OUTPATIENT
Start: 2021-01-01 | End: 2021-01-01 | Stop reason: HOSPADM

## 2021-01-01 RX ORDER — LINEZOLID 2 MG/ML
600 INJECTION, SOLUTION INTRAVENOUS EVERY 12 HOURS
Status: DISCONTINUED | OUTPATIENT
Start: 2021-01-01 | End: 2021-01-01

## 2021-01-01 RX ORDER — SODIUM CHLORIDE 0.9 % (FLUSH) 0.9 %
5-40 SYRINGE (ML) INJECTION PRN
Status: DISCONTINUED | OUTPATIENT
Start: 2021-01-01 | End: 2021-01-01 | Stop reason: HOSPADM

## 2021-01-01 RX ORDER — AMOXICILLIN AND CLAVULANATE POTASSIUM 875; 125 MG/1; MG/1
1 TABLET, FILM COATED ORAL EVERY 12 HOURS SCHEDULED
Status: DISCONTINUED | OUTPATIENT
Start: 2021-01-01 | End: 2021-01-01 | Stop reason: HOSPADM

## 2021-01-01 RX ORDER — IPRATROPIUM BROMIDE AND ALBUTEROL SULFATE 2.5; .5 MG/3ML; MG/3ML
1 SOLUTION RESPIRATORY (INHALATION) 4 TIMES DAILY
Status: DISCONTINUED | OUTPATIENT
Start: 2021-01-01 | End: 2021-01-01 | Stop reason: HOSPADM

## 2021-01-01 RX ORDER — SODIUM CHLORIDE 9 MG/ML
INJECTION, SOLUTION INTRAVENOUS CONTINUOUS PRN
Status: DISCONTINUED | OUTPATIENT
Start: 2021-01-01 | End: 2021-01-01 | Stop reason: SDUPTHER

## 2021-01-01 RX ORDER — TAMSULOSIN HYDROCHLORIDE 0.4 MG/1
0.4 CAPSULE ORAL DAILY
Status: DISCONTINUED | OUTPATIENT
Start: 2021-01-01 | End: 2021-01-01 | Stop reason: HOSPADM

## 2021-01-01 RX ORDER — SULFAMETHOXAZOLE AND TRIMETHOPRIM 800; 160 MG/1; MG/1
1 TABLET ORAL EVERY 12 HOURS SCHEDULED
Qty: 20 TABLET | Refills: 0 | Status: SHIPPED
Start: 2021-01-01 | End: 2021-01-01 | Stop reason: HOSPADM

## 2021-01-01 RX ORDER — LORAZEPAM 2 MG/ML
1 INJECTION INTRAMUSCULAR EVERY 4 HOURS PRN
Status: DISCONTINUED | OUTPATIENT
Start: 2021-01-01 | End: 2021-01-01 | Stop reason: HOSPADM

## 2021-01-01 RX ADMIN — MEROPENEM 1000 MG: 1 INJECTION, POWDER, FOR SOLUTION INTRAVENOUS at 17:42

## 2021-01-01 RX ADMIN — IPRATROPIUM BROMIDE AND ALBUTEROL SULFATE 1 AMPULE: .5; 3 SOLUTION RESPIRATORY (INHALATION) at 12:34

## 2021-01-01 RX ADMIN — CASTOR OIL AND BALSAM, PERU: 788; 87 OINTMENT TOPICAL at 21:26

## 2021-01-01 RX ADMIN — TAMSULOSIN HYDROCHLORIDE 0.4 MG: 0.4 CAPSULE ORAL at 18:13

## 2021-01-01 RX ADMIN — CEFEPIME HYDROCHLORIDE 2000 MG: 2 INJECTION, POWDER, FOR SOLUTION INTRAVENOUS at 20:50

## 2021-01-01 RX ADMIN — Medication 10 ML: at 20:54

## 2021-01-01 RX ADMIN — PROPOFOL 20 MG: 10 INJECTION, EMULSION INTRAVENOUS at 08:42

## 2021-01-01 RX ADMIN — SODIUM CHLORIDE: 9 INJECTION, SOLUTION INTRAVENOUS at 08:00

## 2021-01-01 RX ADMIN — CASTOR OIL AND BALSAM, PERU: 788; 87 OINTMENT TOPICAL at 20:11

## 2021-01-01 RX ADMIN — IPRATROPIUM BROMIDE AND ALBUTEROL SULFATE 1 AMPULE: .5; 3 SOLUTION RESPIRATORY (INHALATION) at 08:25

## 2021-01-01 RX ADMIN — CASTOR OIL AND BALSAM, PERU: 788; 87 OINTMENT TOPICAL at 23:49

## 2021-01-01 RX ADMIN — TAMSULOSIN HYDROCHLORIDE 0.4 MG: 0.4 CAPSULE ORAL at 08:44

## 2021-01-01 RX ADMIN — IPRATROPIUM BROMIDE AND ALBUTEROL SULFATE 1 AMPULE: .5; 3 SOLUTION RESPIRATORY (INHALATION) at 19:50

## 2021-01-01 RX ADMIN — CASTOR OIL AND BALSAM, PERU: 788; 87 OINTMENT TOPICAL at 10:01

## 2021-01-01 RX ADMIN — IOPAMIDOL 75 ML: 755 INJECTION, SOLUTION INTRAVENOUS at 11:25

## 2021-01-01 RX ADMIN — CASTOR OIL AND BALSAM, PERU: 788; 87 OINTMENT TOPICAL at 10:34

## 2021-01-01 RX ADMIN — SODIUM CHLORIDE: 9 INJECTION, SOLUTION INTRAVENOUS at 22:55

## 2021-01-01 RX ADMIN — Medication 10 ML: at 23:06

## 2021-01-01 RX ADMIN — LINEZOLID 600 MG: 600 INJECTION, SOLUTION INTRAVENOUS at 01:26

## 2021-01-01 RX ADMIN — LINEZOLID 600 MG: 600 INJECTION, SOLUTION INTRAVENOUS at 15:44

## 2021-01-01 RX ADMIN — Medication 1 CAPSULE: at 08:44

## 2021-01-01 RX ADMIN — LINEZOLID 600 MG: 600 INJECTION, SOLUTION INTRAVENOUS at 02:48

## 2021-01-01 RX ADMIN — PROPOFOL 20 MG: 10 INJECTION, EMULSION INTRAVENOUS at 08:45

## 2021-01-01 RX ADMIN — CEFEPIME HYDROCHLORIDE 2000 MG: 2 INJECTION, POWDER, FOR SOLUTION INTRAVENOUS at 21:24

## 2021-01-01 RX ADMIN — LOSARTAN POTASSIUM 25 MG: 25 TABLET, FILM COATED ORAL at 09:31

## 2021-01-01 RX ADMIN — ENOXAPARIN SODIUM 40 MG: 40 INJECTION SUBCUTANEOUS at 10:01

## 2021-01-01 RX ADMIN — TAMSULOSIN HYDROCHLORIDE 0.4 MG: 0.4 CAPSULE ORAL at 09:00

## 2021-01-01 RX ADMIN — Medication 1 CAPSULE: at 17:55

## 2021-01-01 RX ADMIN — PIPERACILLIN AND TAZOBACTAM 3375 MG: 3; .375 INJECTION, POWDER, LYOPHILIZED, FOR SOLUTION INTRAVENOUS at 17:56

## 2021-01-01 RX ADMIN — Medication 1 CAPSULE: at 15:36

## 2021-01-01 RX ADMIN — CASTOR OIL AND BALSAM, PERU: 788; 87 OINTMENT TOPICAL at 14:39

## 2021-01-01 RX ADMIN — LOSARTAN POTASSIUM 25 MG: 25 TABLET, FILM COATED ORAL at 09:58

## 2021-01-01 RX ADMIN — ENOXAPARIN SODIUM 40 MG: 40 INJECTION SUBCUTANEOUS at 09:31

## 2021-01-01 RX ADMIN — MEROPENEM 1000 MG: 1 INJECTION, POWDER, FOR SOLUTION INTRAVENOUS at 04:09

## 2021-01-01 RX ADMIN — CASTOR OIL AND BALSAM, PERU: 788; 87 OINTMENT TOPICAL at 20:50

## 2021-01-01 RX ADMIN — LOSARTAN POTASSIUM 25 MG: 25 TABLET, FILM COATED ORAL at 09:47

## 2021-01-01 RX ADMIN — IPRATROPIUM BROMIDE AND ALBUTEROL SULFATE 1 AMPULE: .5; 3 SOLUTION RESPIRATORY (INHALATION) at 19:26

## 2021-01-01 RX ADMIN — Medication 10 ML: at 09:47

## 2021-01-01 RX ADMIN — CEFEPIME HYDROCHLORIDE 2000 MG: 2 INJECTION, POWDER, FOR SOLUTION INTRAVENOUS at 21:32

## 2021-01-01 RX ADMIN — TAMSULOSIN HYDROCHLORIDE 0.4 MG: 0.4 CAPSULE ORAL at 09:31

## 2021-01-01 RX ADMIN — IPRATROPIUM BROMIDE AND ALBUTEROL SULFATE 1 AMPULE: .5; 3 SOLUTION RESPIRATORY (INHALATION) at 10:52

## 2021-01-01 RX ADMIN — MORPHINE SULFATE 2 MG: 2 INJECTION, SOLUTION INTRAMUSCULAR; INTRAVENOUS at 13:16

## 2021-01-01 RX ADMIN — LINEZOLID 600 MG: 600 INJECTION, SOLUTION INTRAVENOUS at 14:55

## 2021-01-01 RX ADMIN — LINEZOLID 600 MG: 600 INJECTION, SOLUTION INTRAVENOUS at 02:00

## 2021-01-01 RX ADMIN — SODIUM CHLORIDE 25 ML: 9 INJECTION, SOLUTION INTRAVENOUS at 12:22

## 2021-01-01 RX ADMIN — ENOXAPARIN SODIUM 40 MG: 40 INJECTION SUBCUTANEOUS at 09:32

## 2021-01-01 RX ADMIN — ENOXAPARIN SODIUM 40 MG: 40 INJECTION SUBCUTANEOUS at 09:58

## 2021-01-01 RX ADMIN — LOSARTAN POTASSIUM 25 MG: 25 TABLET, FILM COATED ORAL at 10:27

## 2021-01-01 RX ADMIN — TAMSULOSIN HYDROCHLORIDE 0.4 MG: 0.4 CAPSULE ORAL at 09:32

## 2021-01-01 RX ADMIN — SODIUM CHLORIDE: 9 INJECTION, SOLUTION INTRAVENOUS at 20:56

## 2021-01-01 RX ADMIN — CASTOR OIL AND BALSAM, PERU: 788; 87 OINTMENT TOPICAL at 10:30

## 2021-01-01 RX ADMIN — CEFEPIME HYDROCHLORIDE 2000 MG: 2 INJECTION, POWDER, FOR SOLUTION INTRAVENOUS at 09:36

## 2021-01-01 RX ADMIN — TAMSULOSIN HYDROCHLORIDE 0.4 MG: 0.4 CAPSULE ORAL at 10:21

## 2021-01-01 RX ADMIN — Medication 10 ML: at 20:13

## 2021-01-01 RX ADMIN — LOSARTAN POTASSIUM 25 MG: 25 TABLET, FILM COATED ORAL at 08:44

## 2021-01-01 RX ADMIN — LIDOCAINE HYDROCHLORIDE 8 ML: 10 INJECTION, SOLUTION EPIDURAL; INFILTRATION; INTRACAUDAL; PERINEURAL at 13:00

## 2021-01-01 RX ADMIN — Medication 1 CAPSULE: at 08:59

## 2021-01-01 RX ADMIN — Medication 10 ML: at 21:27

## 2021-01-01 RX ADMIN — ENOXAPARIN SODIUM 40 MG: 40 INJECTION SUBCUTANEOUS at 09:47

## 2021-01-01 RX ADMIN — CEFEPIME HYDROCHLORIDE 2000 MG: 2 INJECTION, POWDER, FOR SOLUTION INTRAVENOUS at 21:34

## 2021-01-01 RX ADMIN — SODIUM CHLORIDE: 9 INJECTION, SOLUTION INTRAVENOUS at 10:28

## 2021-01-01 RX ADMIN — MEROPENEM 1000 MG: 1 INJECTION, POWDER, FOR SOLUTION INTRAVENOUS at 20:15

## 2021-01-01 RX ADMIN — AMOXICILLIN AND CLAVULANATE POTASSIUM 1 TABLET: 875; 125 TABLET, FILM COATED ORAL at 10:16

## 2021-01-01 RX ADMIN — SODIUM CHLORIDE: 9 INJECTION, SOLUTION INTRAVENOUS at 20:49

## 2021-01-01 RX ADMIN — ENOXAPARIN SODIUM 40 MG: 40 INJECTION SUBCUTANEOUS at 10:00

## 2021-01-01 RX ADMIN — SODIUM CHLORIDE: 9 INJECTION, SOLUTION INTRAVENOUS at 23:59

## 2021-01-01 RX ADMIN — PHENYLEPHRINE HYDROCHLORIDE 100 MCG: 10 INJECTION INTRAVENOUS at 08:50

## 2021-01-01 RX ADMIN — IPRATROPIUM BROMIDE AND ALBUTEROL SULFATE 1 AMPULE: .5; 3 SOLUTION RESPIRATORY (INHALATION) at 20:03

## 2021-01-01 RX ADMIN — IPRATROPIUM BROMIDE AND ALBUTEROL SULFATE 1 AMPULE: .5; 3 SOLUTION RESPIRATORY (INHALATION) at 08:50

## 2021-01-01 RX ADMIN — IPRATROPIUM BROMIDE AND ALBUTEROL SULFATE 1 AMPULE: .5; 3 SOLUTION RESPIRATORY (INHALATION) at 16:41

## 2021-01-01 RX ADMIN — MEROPENEM 1000 MG: 1 INJECTION, POWDER, FOR SOLUTION INTRAVENOUS at 17:58

## 2021-01-01 RX ADMIN — LIDOCAINE HYDROCHLORIDE 80 MG: 20 INJECTION, SOLUTION EPIDURAL; INFILTRATION; INTRACAUDAL; PERINEURAL at 08:40

## 2021-01-01 RX ADMIN — LOSARTAN POTASSIUM 25 MG: 25 TABLET, FILM COATED ORAL at 10:16

## 2021-01-01 RX ADMIN — CASTOR OIL AND BALSAM, PERU: 788; 87 OINTMENT TOPICAL at 21:27

## 2021-01-01 RX ADMIN — Medication 1 CAPSULE: at 09:47

## 2021-01-01 RX ADMIN — SODIUM CHLORIDE: 9 INJECTION, SOLUTION INTRAVENOUS at 15:12

## 2021-01-01 RX ADMIN — LOSARTAN POTASSIUM 25 MG: 25 TABLET, FILM COATED ORAL at 10:22

## 2021-01-01 RX ADMIN — SODIUM CHLORIDE: 9 INJECTION, SOLUTION INTRAVENOUS at 08:35

## 2021-01-01 RX ADMIN — Medication 1 CAPSULE: at 17:43

## 2021-01-01 RX ADMIN — IPRATROPIUM BROMIDE AND ALBUTEROL SULFATE 1 AMPULE: .5; 3 SOLUTION RESPIRATORY (INHALATION) at 15:48

## 2021-01-01 RX ADMIN — Medication 1 CAPSULE: at 10:00

## 2021-01-01 RX ADMIN — Medication 10 ML: at 23:49

## 2021-01-01 RX ADMIN — LINEZOLID 600 MG: 600 INJECTION, SOLUTION INTRAVENOUS at 02:02

## 2021-01-01 RX ADMIN — CEFEPIME HYDROCHLORIDE 2000 MG: 2 INJECTION, POWDER, FOR SOLUTION INTRAVENOUS at 10:22

## 2021-01-01 RX ADMIN — PIPERACILLIN AND TAZOBACTAM 3375 MG: 3; .375 INJECTION, POWDER, LYOPHILIZED, FOR SOLUTION INTRAVENOUS at 08:52

## 2021-01-01 RX ADMIN — CASTOR OIL AND BALSAM, PERU: 788; 87 OINTMENT TOPICAL at 10:07

## 2021-01-01 RX ADMIN — ACETAMINOPHEN 650 MG: 325 TABLET ORAL at 18:09

## 2021-01-01 RX ADMIN — LINEZOLID 600 MG: 600 INJECTION, SOLUTION INTRAVENOUS at 15:48

## 2021-01-01 RX ADMIN — ENOXAPARIN SODIUM 40 MG: 40 INJECTION SUBCUTANEOUS at 10:16

## 2021-01-01 RX ADMIN — LOSARTAN POTASSIUM 25 MG: 25 TABLET, FILM COATED ORAL at 09:00

## 2021-01-01 RX ADMIN — IPRATROPIUM BROMIDE AND ALBUTEROL SULFATE 1 AMPULE: .5; 3 SOLUTION RESPIRATORY (INHALATION) at 12:52

## 2021-01-01 RX ADMIN — TAMSULOSIN HYDROCHLORIDE 0.4 MG: 0.4 CAPSULE ORAL at 10:27

## 2021-01-01 RX ADMIN — SODIUM CHLORIDE: 9 INJECTION, SOLUTION INTRAVENOUS at 10:10

## 2021-01-01 RX ADMIN — LOSARTAN POTASSIUM 25 MG: 25 TABLET, FILM COATED ORAL at 10:20

## 2021-01-01 RX ADMIN — Medication 10 ML: at 10:22

## 2021-01-01 RX ADMIN — ENOXAPARIN SODIUM 40 MG: 40 INJECTION SUBCUTANEOUS at 10:27

## 2021-01-01 RX ADMIN — IPRATROPIUM BROMIDE AND ALBUTEROL SULFATE 1 AMPULE: .5; 3 SOLUTION RESPIRATORY (INHALATION) at 15:53

## 2021-01-01 RX ADMIN — Medication 1 CAPSULE: at 10:16

## 2021-01-01 RX ADMIN — CASTOR OIL AND BALSAM, PERU: 788; 87 OINTMENT TOPICAL at 20:15

## 2021-01-01 RX ADMIN — LINEZOLID 600 MG: 600 INJECTION, SOLUTION INTRAVENOUS at 14:41

## 2021-01-01 RX ADMIN — MEROPENEM 1000 MG: 1 INJECTION, POWDER, FOR SOLUTION INTRAVENOUS at 01:30

## 2021-01-01 RX ADMIN — MEROPENEM 1000 MG: 1 INJECTION, POWDER, FOR SOLUTION INTRAVENOUS at 10:02

## 2021-01-01 RX ADMIN — SODIUM CHLORIDE: 9 INJECTION, SOLUTION INTRAVENOUS at 08:51

## 2021-01-01 RX ADMIN — CASTOR OIL AND BALSAM, PERU: 788; 87 OINTMENT TOPICAL at 10:00

## 2021-01-01 RX ADMIN — CYCLOBENZAPRINE 5 MG: 10 TABLET, FILM COATED ORAL at 20:28

## 2021-01-01 RX ADMIN — ENOXAPARIN SODIUM 40 MG: 40 INJECTION SUBCUTANEOUS at 10:22

## 2021-01-01 RX ADMIN — ENOXAPARIN SODIUM 40 MG: 40 INJECTION SUBCUTANEOUS at 10:20

## 2021-01-01 RX ADMIN — CASTOR OIL AND BALSAM, PERU: 788; 87 OINTMENT TOPICAL at 15:11

## 2021-01-01 RX ADMIN — LINEZOLID 600 MG: 600 INJECTION, SOLUTION INTRAVENOUS at 02:28

## 2021-01-01 RX ADMIN — CASTOR OIL AND BALSAM, PERU: 788; 87 OINTMENT TOPICAL at 20:24

## 2021-01-01 RX ADMIN — CASTOR OIL AND BALSAM, PERU: 788; 87 OINTMENT TOPICAL at 21:31

## 2021-01-01 RX ADMIN — Medication 10 ML: at 10:00

## 2021-01-01 RX ADMIN — SODIUM CHLORIDE: 9 INJECTION, SOLUTION INTRAVENOUS at 10:27

## 2021-01-01 RX ADMIN — FENTANYL CITRATE 25 MCG: 50 INJECTION, SOLUTION INTRAMUSCULAR; INTRAVENOUS at 08:47

## 2021-01-01 RX ADMIN — MEROPENEM 1000 MG: 1 INJECTION, POWDER, FOR SOLUTION INTRAVENOUS at 12:23

## 2021-01-01 RX ADMIN — CASTOR OIL AND BALSAM, PERU: 788; 87 OINTMENT TOPICAL at 14:41

## 2021-01-01 RX ADMIN — CASTOR OIL AND BALSAM, PERU: 788; 87 OINTMENT TOPICAL at 10:22

## 2021-01-01 RX ADMIN — Medication 10 ML: at 10:02

## 2021-01-01 RX ADMIN — LINEZOLID 600 MG: 600 INJECTION, SOLUTION INTRAVENOUS at 16:27

## 2021-01-01 RX ADMIN — CEFEPIME HYDROCHLORIDE 2000 MG: 2 INJECTION, POWDER, FOR SOLUTION INTRAVENOUS at 09:32

## 2021-01-01 RX ADMIN — SODIUM CHLORIDE: 9 INJECTION, SOLUTION INTRAVENOUS at 23:42

## 2021-01-01 RX ADMIN — TAMSULOSIN HYDROCHLORIDE 0.4 MG: 0.4 CAPSULE ORAL at 10:00

## 2021-01-01 RX ADMIN — PHENYLEPHRINE HYDROCHLORIDE 100 MCG: 10 INJECTION INTRAVENOUS at 08:57

## 2021-01-01 RX ADMIN — Medication 10 ML: at 09:32

## 2021-01-01 RX ADMIN — Medication 10 ML: at 10:21

## 2021-01-01 RX ADMIN — CEFEPIME HYDROCHLORIDE 2000 MG: 2 INJECTION, POWDER, FOR SOLUTION INTRAVENOUS at 10:30

## 2021-01-01 RX ADMIN — SODIUM CHLORIDE: 9 INJECTION, SOLUTION INTRAVENOUS at 13:41

## 2021-01-01 RX ADMIN — FUROSEMIDE 40 MG: 10 INJECTION, SOLUTION INTRAVENOUS at 10:09

## 2021-01-01 RX ADMIN — SODIUM CHLORIDE: 9 INJECTION, SOLUTION INTRAVENOUS at 18:44

## 2021-01-01 RX ADMIN — IPRATROPIUM BROMIDE AND ALBUTEROL SULFATE 1 AMPULE: .5; 3 SOLUTION RESPIRATORY (INHALATION) at 15:56

## 2021-01-01 RX ADMIN — ENOXAPARIN SODIUM 40 MG: 40 INJECTION SUBCUTANEOUS at 09:00

## 2021-01-01 RX ADMIN — CASTOR OIL AND BALSAM, PERU: 788; 87 OINTMENT TOPICAL at 20:56

## 2021-01-01 RX ADMIN — IPRATROPIUM BROMIDE AND ALBUTEROL SULFATE 1 AMPULE: .5; 3 SOLUTION RESPIRATORY (INHALATION) at 20:28

## 2021-01-01 RX ADMIN — CEFEPIME HYDROCHLORIDE 2000 MG: 2 INJECTION, POWDER, FOR SOLUTION INTRAVENOUS at 10:23

## 2021-01-01 RX ADMIN — CEFEPIME HYDROCHLORIDE 2000 MG: 2 INJECTION, POWDER, FOR SOLUTION INTRAVENOUS at 21:28

## 2021-01-01 RX ADMIN — IPRATROPIUM BROMIDE AND ALBUTEROL SULFATE 1 AMPULE: .5; 3 SOLUTION RESPIRATORY (INHALATION) at 12:40

## 2021-01-01 RX ADMIN — Medication 10 ML: at 10:10

## 2021-01-01 RX ADMIN — TAMSULOSIN HYDROCHLORIDE 0.4 MG: 0.4 CAPSULE ORAL at 09:47

## 2021-01-01 RX ADMIN — LOSARTAN POTASSIUM 25 MG: 25 TABLET, FILM COATED ORAL at 10:00

## 2021-01-01 RX ADMIN — LOSARTAN POTASSIUM 25 MG: 25 TABLET, FILM COATED ORAL at 09:32

## 2021-01-01 RX ADMIN — TAMSULOSIN HYDROCHLORIDE 0.4 MG: 0.4 CAPSULE ORAL at 10:31

## 2021-01-01 RX ADMIN — Medication 10 ML: at 09:31

## 2021-01-01 RX ADMIN — TETANUS TOXOID, REDUCED DIPHTHERIA TOXOID AND ACELLULAR PERTUSSIS VACCINE, ADSORBED 0.5 ML: 5; 2.5; 8; 8; 2.5 SUSPENSION INTRAMUSCULAR at 15:34

## 2021-01-01 RX ADMIN — IPRATROPIUM BROMIDE AND ALBUTEROL SULFATE 1 AMPULE: .5; 3 SOLUTION RESPIRATORY (INHALATION) at 21:09

## 2021-01-01 RX ADMIN — IPRATROPIUM BROMIDE AND ALBUTEROL SULFATE 1 AMPULE: .5; 3 SOLUTION RESPIRATORY (INHALATION) at 08:11

## 2021-01-01 RX ADMIN — ENOXAPARIN SODIUM 40 MG: 40 INJECTION SUBCUTANEOUS at 08:43

## 2021-01-01 RX ADMIN — TAMSULOSIN HYDROCHLORIDE 0.4 MG: 0.4 CAPSULE ORAL at 10:01

## 2021-01-01 RX ADMIN — MEROPENEM 1000 MG: 1 INJECTION, POWDER, FOR SOLUTION INTRAVENOUS at 12:07

## 2021-01-01 RX ADMIN — CASTOR OIL AND BALSAM, PERU: 788; 87 OINTMENT TOPICAL at 09:58

## 2021-01-01 RX ADMIN — Medication 10 ML: at 10:31

## 2021-01-01 RX ADMIN — PROPOFOL 20 MG: 10 INJECTION, EMULSION INTRAVENOUS at 08:43

## 2021-01-01 RX ADMIN — IPRATROPIUM BROMIDE AND ALBUTEROL SULFATE 1 AMPULE: .5; 3 SOLUTION RESPIRATORY (INHALATION) at 08:30

## 2021-01-01 RX ADMIN — CASTOR OIL AND BALSAM, PERU: 788; 87 OINTMENT TOPICAL at 09:31

## 2021-01-01 RX ADMIN — CYCLOBENZAPRINE 5 MG: 10 TABLET, FILM COATED ORAL at 12:01

## 2021-01-01 RX ADMIN — TAMSULOSIN HYDROCHLORIDE 0.4 MG: 0.4 CAPSULE ORAL at 09:58

## 2021-01-01 RX ADMIN — AMOXICILLIN AND CLAVULANATE POTASSIUM 1 TABLET: 875; 125 TABLET, FILM COATED ORAL at 23:49

## 2021-01-01 RX ADMIN — CASTOR OIL AND BALSAM, PERU: 788; 87 OINTMENT TOPICAL at 11:54

## 2021-01-01 RX ADMIN — PIPERACILLIN AND TAZOBACTAM 3375 MG: 3; .375 INJECTION, POWDER, LYOPHILIZED, FOR SOLUTION INTRAVENOUS at 02:30

## 2021-01-01 RX ADMIN — SODIUM CHLORIDE: 9 INJECTION, SOLUTION INTRAVENOUS at 23:29

## 2021-01-01 RX ADMIN — Medication 10 ML: at 08:54

## 2021-01-01 RX ADMIN — MEROPENEM 1000 MG: 1 INJECTION, POWDER, FOR SOLUTION INTRAVENOUS at 01:15

## 2021-01-01 RX ADMIN — SODIUM CHLORIDE: 9 INJECTION, SOLUTION INTRAVENOUS at 06:21

## 2021-01-01 RX ADMIN — SODIUM CHLORIDE: 9 INJECTION, SOLUTION INTRAVENOUS at 23:12

## 2021-01-01 RX ADMIN — PHENYLEPHRINE HYDROCHLORIDE 100 MCG: 10 INJECTION INTRAVENOUS at 08:47

## 2021-01-01 RX ADMIN — MEROPENEM 1000 MG: 1 INJECTION, POWDER, FOR SOLUTION INTRAVENOUS at 04:31

## 2021-01-01 RX ADMIN — Medication 1 CAPSULE: at 16:22

## 2021-01-01 RX ADMIN — CEFEPIME HYDROCHLORIDE 2000 MG: 2 INJECTION, POWDER, FOR SOLUTION INTRAVENOUS at 10:05

## 2021-01-01 RX ADMIN — FENTANYL CITRATE 25 MCG: 50 INJECTION, SOLUTION INTRAMUSCULAR; INTRAVENOUS at 08:42

## 2021-01-01 RX ADMIN — IPRATROPIUM BROMIDE AND ALBUTEROL SULFATE 1 AMPULE: .5; 3 SOLUTION RESPIRATORY (INHALATION) at 17:12

## 2021-01-01 RX ADMIN — CASTOR OIL AND BALSAM, PERU: 788; 87 OINTMENT TOPICAL at 21:00

## 2021-01-01 RX ADMIN — MEROPENEM 1000 MG: 1 INJECTION, POWDER, FOR SOLUTION INTRAVENOUS at 13:05

## 2021-01-01 RX ADMIN — Medication 10 ML: at 12:05

## 2021-01-01 RX ADMIN — CASTOR OIL AND BALSAM, PERU: 788; 87 OINTMENT TOPICAL at 20:13

## 2021-01-01 RX ADMIN — LOSARTAN POTASSIUM 25 MG: 25 TABLET, FILM COATED ORAL at 10:01

## 2021-01-01 RX ADMIN — Medication 1 CAPSULE: at 17:15

## 2021-01-01 RX ADMIN — SODIUM CHLORIDE: 9 INJECTION, SOLUTION INTRAVENOUS at 21:58

## 2021-01-01 ASSESSMENT — PAIN SCALES - GENERAL
PAINLEVEL_OUTOF10: 0

## 2021-01-01 ASSESSMENT — ENCOUNTER SYMPTOMS
SHORTNESS OF BREATH: 0
SHORTNESS OF BREATH: 0
NAUSEA: 0
COUGH: 0
ABDOMINAL PAIN: 0
VOMITING: 0
RHINORRHEA: 0
DIARRHEA: 0

## 2021-01-01 ASSESSMENT — PULMONARY FUNCTION TESTS
PIF_VALUE: 0
PIF_VALUE: 1
PIF_VALUE: 1
PIF_VALUE: 0
PIF_VALUE: 0

## 2021-01-28 NOTE — TELEPHONE ENCOUNTER
Medication Refill:     olmesartan (BENICAR) 20 MG tablet [[id number]      [facility], [address] - F [phone]

## 2021-06-14 NOTE — TELEPHONE ENCOUNTER
Patient wife called stating that Dr. Umer Chau was going to order home health care and wound care for her  and she has not heard anything. Please call to advise.

## 2021-06-17 PROBLEM — R62.7 FAILURE TO THRIVE IN ADULT: Status: ACTIVE | Noted: 2021-01-01

## 2021-06-17 NOTE — H&P
HOSPITALISTS HISTORY AND PHYSICAL    6/17/2021 6:05 PM    Patient Information:  Malissa Galvan is a 79 y.o. male [id number]  PCP:  Bryant Paula MD (Tel: [phone] )    Chief complaint:    Chief Complaint   Patient presents with    Wound Check      With wound check. History of Present Illness:  Kathie Rosenbaum is a 79 y.o. male who presented with wound check. Patient has this buttock wound ongoing for a while. Patient with history of laryngeal cancer who uses electronic larynx box to talk. Per wife patient has been mostly sitting in the chair and laying in the bed with poor appetite. Patient per se does not have any complaints but wife is slowly overwhelmed. Has been following and patient is afraid to walk because of concern for fall. Essentially normal bowel. Patient denies any fever chills no nausea vomiting diarrhea. Cachectic appearing patient. REVIEW OF SYSTEMS:   Constitutional: Negative for fever,chills or night sweats  ENT: Negative for rhinorrhea, epistaxis, hoarseness, sore throat. Respiratory: Negative for shortness of breath,wheezing  Cardiovascular: Negative for chest pain, palpitations   Gastrointestinal: Negative for nausea, vomiting, diarrhea  Genitourinary: Negative for polyuria, dysuria   Hematologic/Lymphatic: Negative for bleeding tendency, easy bruising  Musculoskeletal: Negative for myalgias and arthralgias  Neurologic: Negative for confusion,dysarthria. Skin: Negative for itching,rash, good capillary refill. Psychiatric: Negative for depression,anxiety, agitation. Endocrine: Negative for polydipsia,polyuria,heat /cold intolerance. Past Medical History:   has a past medical history of Blood in stool, Elevated PSA, Full dentures, Hypertension, Inguinal hernia bilateral, non-recurrent, Laryngeal cancer (HCC), Mitral valve regurgitation, and Renal insufficiency.      Past Surgical History:   has a past surgical history that includes Laryngectomy (2002) and Colonoscopy (N/A, 10/9/2019). Medications:  No current facility-administered medications on file prior to encounter. Current Outpatient Medications on File Prior to Encounter   Medication Sig Dispense Refill    olmesartan (BENICAR) 20 MG tablet Take 0.5 tablets by mouth nightly 45 tablet 3    nystatin (MYCOSTATIN) 454721 UNIT/GM powder Apply daily to groin rash 60 g 1       Allergies:  No Known Allergies     Social History:   reports that he quit smoking about 18 years ago. His smoking use included cigarettes. He started smoking about 63 years ago. He has a 150.00 pack-year smoking history. He has never used smokeless tobacco. He reports that he does not drink alcohol and does not use drugs. Family History:  Noncontributory to current    Physical Exam:  /88   Pulse 98   Temp 98.9 °F (37.2 °C) (Oral)   Resp 21   Wt 170 lb (77.1 kg)   SpO2 94%   BMI 24.39 kg/m²     General appearance:  Appears comfortable. Well nourished  Eyes: Sclera clear, pupils equal  ENT: Moist mucus membranes, no thrush. Trachea midline. Cardiovascular: Regular rhythm, normal S1, S2. No murmur, gallop, rub. No edema in lower extremities  Respiratory: Clear to auscultation bilaterally, no wheeze, good inspiratory effort  Gastrointestinal: Abdomen soft, non-tender, not distended, normal bowel sounds  Musculoskeletal: No cyanosis in digits, neck supple  Neurology: Cranial nerves grossly intact. Alert and oriented in time, place and person. No speech or motor deficits  Psychiatry: Appropriate affect. Not agitated  Skin: Warm, dry, normal turgor, no rash  Large decub ulcer noted. Efren Laguerre looks clean but does have surrounding erythema.   Labs:  CBC:   Lab Results   Component Value Date    WBC 14.6 06/17/2021    RBC 3.82 06/17/2021    HGB 11.6 06/17/2021    HCT 34.6 06/17/2021    MCV 90.6 06/17/2021    MCH 30.5 06/17/2021    MCHC 33.7 06/17/2021    RDW 13.1 06/17/2021     06/17/2021 MPV 8.8 06/17/2021     BMP:    Lab Results   Component Value Date     06/17/2021    K 3.6 06/17/2021    CL 97 06/17/2021    CO2 26 06/17/2021    BUN 25 06/17/2021    CREATININE 0.8 06/17/2021    CALCIUM 9.8 06/17/2021    GFRAA >60 06/17/2021    LABGLOM >60 06/17/2021    GLUCOSE 119 06/17/2021       Chest Xray:   EKG:    I visualized CXR images and EKG strips     Discussed  with      Problem List  Active Problems:    Failure to thrive in adult  Resolved Problems:    * No resolved hospital problems.  *        Assessment/Plan:   Failure to thrive  -Mostly bedbound  severe deconditioning  -With underlying history of laryngeal cancer  -Patient was admitted for further evaluation check electrolytes replace as needed  -Discussed with family they are open to rehab PT OT ordered    Decub ulcer  -Initial evaluation does not look infected  -We will get CT to rule out any underlying bone pathology x-ray did not confirm osteo-  -Hold off on antibiotics right now            Eva Parkinson MD    6/17/2021 6:05 PM

## 2021-06-17 NOTE — TELEPHONE ENCOUNTER
Loretta called to let us know that today when she went to see Jes Lowery he was not standing at all that he will only sit is afraid to fall. Also that Jes Lowery has as open area on his butt from only sitting but he is also sitting in his own urin so they are afraid he is going to develop a staph infection from that .  They sent Jes Lowery to [facility]     If any questions please call to advise

## 2021-06-17 NOTE — ED PROVIDER NOTES
[address] [facility]        Pt Name: Cortez Wilde  MRN: [id number]  [place] 1940  Date of evaluation: 6/17/2021  Provider: David Mckeon PA-C  PCP: Candis Yost MD  Note Started: 5:43 PM EDT       BHARATI. I have evaluated this patient. My supervising physician was available for consultation. CHIEF COMPLAINT       Chief Complaint   Patient presents with    Wound Check       HISTORY OF PRESENT ILLNESS   (Location, Timing/Onset, Context/Setting, Quality, Duration, Modifying Factors, Severity, Associated Signs and Symptoms)  Note limiting factors. Cortez Wilde is a 79 y.o. male who presents to the emergency department today for evaluation for a left buttocks wound. The patient has a history of a laryngeal cancer, which has been resected, does use electronics Larynex box. The wife is at bedside, she tells me that the nurse came out for the first time today, noticed a wound to the left buttocks, and the patient was sent to the emergency room for further care and evaluation. With the patient arrives to the ED he has no complaints. He denies any pain to the area. The wife feels that the area has gotten slightly larger. She tells me that the patient had a fall over 1 month ago, and she states that he was afraid of falling, therefore she states that he is no longer exercising, and she states that he is essentially been nonmobile and has been sitting for the past month. She states that she has been trying to apply dressings at this area, but she feels that the patient needs more help at home. The patient does not have any chest pain. No shortness of breath. No abdominal pain, nausea, vomiting or diarrhea. No dysuria, no other complaints at this time. Nursing Notes were all reviewed and agreed with or any disagreements were addressed in the HPI.     REVIEW OF SYSTEMS    (2-9 systems for level 4, 10 or more for level 5)     Review of Systems   Constitutional: Negative for activity change, appetite change, chills and fever. HENT: Negative for congestion and rhinorrhea. Respiratory: Negative for cough and shortness of breath. Cardiovascular: Negative for chest pain. Gastrointestinal: Negative for abdominal pain, diarrhea, nausea and vomiting. Genitourinary: Negative for difficulty urinating, dysuria and hematuria. Skin: Positive for wound. Positives and Pertinent negatives as per HPI. Except as noted above in the ROS, all other systems were reviewed and negative. PAST MEDICAL HISTORY     Past Medical History:   Diagnosis Date    Blood in stool     Elevated PSA 3/14    Full dentures     Hypertension     Inguinal hernia bilateral, non-recurrent 2019    Bilateral-noted on CT-asymptomatic    Laryngeal cancer ([address]     resected/uses electronic larynix box    Mitral valve regurgitation 3/14    moderate    Renal insufficiency          SURGICAL HISTORY     Past Surgical History:   Procedure Laterality Date    COLONOSCOPY N/A 10/9/2019    Normal colonoscopy. Extensive hemorrhoids-probably account for bleeding    LARYNGECTOMY      stoma         CURRENTMEDICATIONS       Previous Medications    NYSTATIN (MYCOSTATIN) 080960 UNIT/GM POWDER    Apply daily to groin rash    OLMESARTAN (BENICAR) 20 MG TABLET    Take 0.5 tablets by mouth nightly         ALLERGIES     Patient has no known allergies. FAMILYHISTORY     No family history on file.        SOCIAL HISTORY       Social History     Tobacco Use    Smoking status: Former Smoker     Packs/day: 10.00     Years: 15.00     Pack years: 150.00     Types: Cigarettes     Start date: 1957     Quit date: 2002     Years since quittin.5    Smokeless tobacco: Never Used   Vaping Use    Vaping Use: Never used   Substance Use Topics    Alcohol use: No     Alcohol/week: 0.0 standard drinks    Drug use: No       SCREENINGS             PHYSICAL EXAM    (up to Abnormal; Notable for the following components:    Sodium 133 (*)     Chloride 97 (*)     Glucose 119 (*)     BUN 25 (*)     All other components within normal limits    Narrative:     Performed at:  [facility] [address] [facility] [facility] [place],  [place], [address]   Phone [phone]   BRAIN NATRIURETIC PEPTIDE - Abnormal; Notable for the following components:    Pro- (*)     All other components within normal limits    Narrative:     Performed at:  [facility] [address] [facility] [facility] [place],  [place], [address]   Phone [phone]   URINE RT REFLEX TO CULTURE - Abnormal; Notable for the following components:    Clarity, UA CLOUDY (*)     Bilirubin Urine SMALL (*)     Ketones, Urine TRACE (*)     Blood, Urine MODERATE (*)     All other components within normal limits    Narrative:     Performed at:  [facility] [address] [address] [address]   Phone [phone]   MICROSCOPIC URINALYSIS - Abnormal; Notable for the following components:    RBC, UA 37 (*)     All other components within normal limits    Narrative:     Performed at:  [facility] [address] [address] [address]   Phone [phone]   CULTURE, BLOOD 2   CULTURE, BLOOD 1   LACTATE, SEPSIS    Narrative:     Performed at:  [facility] [address] [address] [address]   Phone [phone]   TROPONIN    Narrative:     Performed at:  [facility] [address] [address] [address]   Phone [phone]       All other labs were within normal range or not returned as of this dictation. EKG: All EKG's are interpreted by the Emergency Department Physician in the absence of a cardiologist.  Please see their note for interpretation of EKG.     RADIOLOGY:   Non-plain film images such as CT, Ultrasound and MRI are read by the radiologist. Plain radiographic images are visualized and preliminarily interpreted by the ED Provider with the below findings:        Interpretation per the Radiologist below, if available at the time of this note:    XR SACRUM COCCYX (MIN 2 VIEWS)   Final Result   Study limited by positioning and osteopenia. No acute osseous abnormality. Consider CT follow-up if there is continued clinical concern. XR CHEST PORTABLE   Final Result   No acute cardiopulmonary disease. XR SACRUM COCCYX (MIN 2 VIEWS)    Result Date: 6/17/2021  EXAMINATION: THREE XRAY VIEWS OF THE SACRUM/COCCYX 6/17/2021 4:02 pm COMPARISON: CT 06/13/2019. HISTORY: ORDERING SYSTEM PROVIDED HISTORY: Injury TECHNOLOGIST PROVIDED HISTORY: Reason for exam:->Injury Reason for Exam: Pt has wound on tailbone. Has difficulty laying flat. Held for xray; best images possible. Acuity: Chronic Type of Exam: Ongoing FINDINGS: The lateral view in particular is limited by positioning and osteopenia. No acute osseous abnormality of the sacrum or coccyx. The SI joints are maintained. The remainder of the visualized pelvis is unremarkable. Study limited by positioning and osteopenia. No acute osseous abnormality. Consider CT follow-up if there is continued clinical concern. XR CHEST PORTABLE    Result Date: 6/17/2021  EXAMINATION: ONE XRAY VIEW OF THE CHEST 6/17/2021 4:02 pm COMPARISON: None. HISTORY: ORDERING SYSTEM PROVIDED HISTORY: SOB TECHNOLOGIST PROVIDED HISTORY: Reason for exam:->SOB Reason for Exam: Pt has wound on tailbone. Has difficulty laying flat. Held for xray; best images possible. Acuity: Chronic Type of Exam: Ongoing FINDINGS: Asymmetric elevation of the right hemidiaphragm. The lungs are clear. No infiltrate, pleural fluid or evidence of overt failure. The cardiac silhouette is enlarged, likely accentuated by technique and positioning. Osseous structures are unremarkable. No acute cardiopulmonary disease.            PROCEDURES   Unless otherwise noted below, none     Procedures    CRITICAL CARE TIME   N/A    CONSULTS:  None      EMERGENCY DEPARTMENT COURSE and DIFFERENTIAL DIAGNOSIS/MDM:   Vitals:    Vitals:    06/17/21 1434 06/17/21 1520 06/17/21 1627 06/17/21 1731   BP: 129/88      Pulse: 93 94 99 98   Resp: 18 24 25 21   Temp: 98.9 °F (37.2 °C)      TempSrc: Oral      SpO2: 97% 100% 95% 94%   Weight: 170 lb (77.1 kg)          Patient was given the following medications:  Medications - No data to display        Briefly, this is an 79-year-old male who presents to the emergency department today for evaluation for a wound to his left buttock. Per the wife, this has been there for over 1 month as the patient has a fear of falling, and therefore is essentially been immobile. The family states that they had a home health nurse come out today for the first time, and she was concerned about the wound and sent the patient to the ED. When the patient arrived to the ED, he denies any pain to the area. There is no been any drainage. No other complaints or symptoms    Physical exam he does have a pressure ulcer noted to the left buttock, please see image above. No ulcerations noted. No draining. No lymphangitic streaking. EKG seconded by my attending, Dr. Danis Wayne, please see his note for further details    Urine does show blood but this is likely due to catheterization, no evidence of infection. Patient has a nonspecific leukocytosis of 14.6, there is mild anemia. His lactic acid is normal.  CMP is unremarkable. Troponin is negative. Imaging is unremarkable, no evidence of any osteomyelitis. I had a long talk with the patient as well as the wife, they feel that the patient will be best suited in a rehabilitation as the patient is essentially immobile at this time, the wife states that she needs more help at home. The hospitalist is agreed for admission, patient and family are updated and agreeable with plan. Stable for admission.     FINAL IMPRESSION      1. Pressure injury of skin of left buttock, unspecified injury stage    2. Inability to walk          DISPOSITION/PLAN   DISPOSITION Decision To Admit 06/17/2021 05:35:08 PM      PATIENT REFERRED TO:  No follow-up provider specified.     DISCHARGE MEDICATIONS:  New Prescriptions    No medications on file       DISCONTINUED MEDICATIONS:  Discontinued Medications    No medications on file              (Please note that portions of this note were completed with a voice recognition program.  Efforts were made to edit the dictations but occasionally words are mis-transcribed.)    Darius Parsons PA-C (electronically signed)            Darius Parsons PA-C  06/17/21 8500

## 2021-06-17 NOTE — ED PROVIDER NOTES
EKG NOTE:    Sinus tachycardia at a rate of 101 beats a minute with a right bundle much block with no acute ST elevations or depressions or pathologic Q waves noted. I was not involved with the care of this patient.        Carlos A Khalil MD  06/17/21 5553

## 2021-06-17 NOTE — ED NOTES
Bed: 20  Expected date: 6/17/21  Expected time:   Means of arrival: [facility]  Comments:  [facility]  06/17/21 2586

## 2021-06-18 PROBLEM — E43 SEVERE MALNUTRITION (HCC): Status: ACTIVE | Noted: 2021-01-01

## 2021-06-18 NOTE — PROGRESS NOTES
4 Eyes Skin Assessment     The patient is being assess for  Admission    I agree that 2 RN's have performed a thorough Head to Toe Skin Assessment on the patient. ALL assessment sites listed below have been assessed. Areas assessed by both nurses:   [x]   Head, Face, and Ears   [x]   Shoulders, Back, and Chest  [x]   Arms, Elbows, and Hands   [x]   Coccyx, Sacrum, and IschIum  [x]   Legs, Feet, and Heels        Does the Patient have Skin Breakdown?   Yes LDA WOUND CARE was Initiated documentation include the Fiordaliza-wound, Wound Assessment, Measurements, Dressing Treatment, Drainage, and Color\",         Emile Prevention initiated:  Yes   Wound Care Orders initiated:  Yes      [address] nurse consulted for Pressure Injury (Stage 3,4, Unstageable, DTI, NWPT, and Complex wounds), New and Established Ostomies:  Yes      Nurse 1 eSignature: Electronically signed by Erika Rivera RN on 6/18/21 at 3:02 AM EDT    **SHARE this note so that the co-signing nurse is able to place an eSignature**    Nurse 2 eSignature: Electronically signed by Arsenio Hill RN on 6/18/21 at 3:24 AM EDT

## 2021-06-18 NOTE — CARE COORDINATION
SW received a call back from [facility] stating that pt only has Medicare Part B, so his [employer] [facility] [employer] is his primary insurance. Stated they accept [employer] [place], however, they only pay 80%. So pt would be responsible for 20% of the cost which is about $57/day. SW attempted to contact wife to discuss but she left the hospital.  Tried calling at home with no answer. [facility] Batters will still start precert. PT/OT still pending. Discharge Plan:  Precert started w/[facility]. Pt will have 20% copayment since [employer] is primary insurance. Need to discuss $57/day copay with pt and spouse and make sure they are agreeable.     Electronically signed by JULIA Ferguson, LSW on 6/18/2021 at 3:21 PM

## 2021-06-18 NOTE — PROGRESS NOTES
Complexity  History: As above. Exam: MMT, ROM, functional mobility  Clinical Presentation: Stable, currently. PT Education: Goals;PT Role;Plan of Care;Transfer Training;General Safety; Functional Mobility Training  Patient Education: Patient nodded his head in understanding. Barriers to Learning: None evident. REQUIRES PT FOLLOW UP: Yes  Activity Tolerance  Activity Tolerance: Patient limited by endurance; Patient limited by fatigue;Patient limited by pain  Activity Tolerance: Patient unable to actively participate in a meaningful fashion. He appeared in great discomfort during mobility. Patient Diagnosis(es): The primary encounter diagnosis was Pressure injury of skin of left buttock, unspecified injury stage. A diagnosis of Inability to walk was also pertinent to this visit. has a past medical history of Blood in stool, Elevated PSA, Full dentures, Hypertension, Inguinal hernia bilateral, non-recurrent, Laryngeal cancer (HCC), Mitral valve regurgitation, and Renal insufficiency. has a past surgical history that includes Laryngectomy (2002) and Colonoscopy (N/A, 10/9/2019). Restrictions  Restrictions/Precautions  Restrictions/Precautions: Fall Risk (high fall risk)  Position Activity Restriction  Other position/activity restrictions: Bruno Rich is a 79 y.o. male who presents to the emergency department today for evaluation for a left buttocks wound. The patient has a history of a laryngeal cancer, which has been resected, does use electronics Larynex box. The wife is at bedside, she tells me that the nurse came out for the first time today, noticed a wound to the left buttocks, and the patient was sent to the emergency room for further care and evaluation. With the patient arrives to the ED he has no complaints. He denies any pain to the area. The wife feels that the area has gotten slightly larger.   She tells me that the patient had a fall over 1 month ago, and she states that he was afraid of falling, therefore she states that he is no longer exercising, and she states that he is essentially been nonmobile and has been sitting for the past month. She states that she has been trying to apply dressings at this area, but she feels that the patient needs more help at home. The patient does not have any chest pain. No shortness of breath. No abdominal pain, nausea, vomiting or diarrhea. No dysuria, no other complaints at this time. Vision/Hearing  Vision: Within Functional Limits  Hearing: Exceptions to [facility]  Hearing Exceptions: Hard of hearing/hearing concerns       Subjective  General  Chart Reviewed: Yes  Patient assessed for rehabilitation services?: Yes  Response To Previous Treatment: Not applicable  Family / Caregiver Present: No  Diagnosis: Failure to thrive  Follows Commands: Within Functional Limits  General Comment  Comments: Patient right sidelying, right rotated in bed w/ LLE maximally flexed in both hip and knee flexion. Subjective  Subjective: Patient reports he was ambulating 4 days prior to admission. Per chart, RN, patient is bed bound at home. 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 Additional Comments: Patient stated stopped walking about 4 days ago due to fear of falling.  Stated \"I was on the couch all the time, did not go to my bed. I am getting a hospital bed\"    Objective  Observation/Palpation  Observation: Patient noted to have both UE and LE contractures, L > R. Sacral ulcer noted, bright pink and red w/ slough towards center, see wound care note. Appears to have red rash from incontinence. PROM RLE (degrees)  RLE General PROM: Patient demonstrates flexion contracture, able to move only a few degrees into hip/knee extension before needing to stop d/t pain. AROM RLE (degrees)  RLE General AROM: No AROM, unable to perform on command. PROM LLE (degrees)  LLE General PROM: Patient demonstrates flexion contracture, able to move only a few degrees into hip/knee extension before needing to stop d/t pain. AROM LLE (degrees)  LLE General AROM: No AROM, unable to perform on command. Strength RLE  Comment: Grossly 1/5  Strength LLE  Comment: Grossly 1/5     Sensation  Overall Sensation Status: WNL  Bed mobility  Rolling to Left: 2 Person assistance;Dependent/Total  Rolling to Right: 2 Person assistance;Dependent/Total  Comment: Patient initially declined therapy but then agreeable. He was found to be incontinent of stool. He was rolled right and left, unable to assist, he required total assist.  Transfers  Sit to Stand: Unable to assess  Comment: Patient unable to tolerate more than rolling in bed, unable to sit d/t contractures. Ambulation  Ambulation?: No  Stairs/Curb  Stairs?: No     Balance  Posture: Poor  Comments: Patient currently unable to sit or stand. Plan   Plan  Times per week: 3-5  Current Treatment Recommendations: Strengthening, Safety Education & Training, Patient/Caregiver Education & Training, Functional Mobility Training, Equipment Evaluation, Education, & procurement, Transfer Training  Safety Devices  Type of devices:  All fall risk precautions in place, Call light within reach, Bed alarm in place, Patient at risk for falls, Left in bed, Nurse notified  Restraints  Initially in place: No    AM-PAC Score  AM-PAC Inpatient Mobility Raw Score : 6 (06/18/21 1526)  AM-PAC Inpatient T-Scale Score : 23.55 (06/18/21 1526)  Mobility Inpatient CMS 0-100% Score: 100 (06/18/21 1526)  Mobility Inpatient CMS G-Code Modifier : CN (06/18/21 1526)     Goals  Short term goals  Time Frame for Short term goals: Discharge. Short term goal 1: Patient will perform supine-sit w/ MAX x2 assist.  Short term goal 2: Patient will transfer bed-chair w/ lashonda lift. Patient Goals   Patient goals : None verbalized.      Therapy Time   Individual Concurrent Group Co-treatment   Time In 5019         Time Out 1440         Minutes 43         Timed Code Treatment Minutes: [address], [place], DPT, ATC-R [id number] please keep me informed

## 2021-06-18 NOTE — PROGRESS NOTES
Comprehensive Nutrition Assessment    Type and Reason for Visit:  Initial, Positive Nutrition Screen    Nutrition Recommendations/Plan:   1. Continue current diet  2. RD ordered Ensure Enlive TID- strawberry  3. Encourage po    Nutrition Assessment:  Pt is severely malnourished. Pt has lost 24 lbs in the past 8 months resulting in a -15% change in wt. Pt denies any n/v. But during this time pt wasn't eating much. Pt also has a stage 2 pressure ulcer on coccyx. Pt is open to RD ordered supplement - Ensure Enlive TID. RD to monitor for supp tolerance and intake at meals. Malnutrition Assessment:  Malnutrition Status:  Severe malnutrition    Context:  Chronic Illness     Findings of the 6 clinical characteristics of malnutrition:  Energy Intake:  7 - 75% or less estimated energy requirements for 1 month or longer  Weight Loss:  7 - Greater than 10% over 6 months (-15% in 8 months)     Body Fat Loss:  7 - Severe body fat loss Orbital, Triceps   Muscle Mass Loss:  7 - Severe muscle mass loss Temples (temporalis), Clavicles (pectoralis & deltoids)  Fluid Accumulation:  No significant fluid accumulation     Strength:  Not Performed    Estimated Daily Nutrient Needs:  Energy (kcal):  8381-4807 cals (30-35 cals/62kg for wound healing); Weight Used for Energy Requirements:  Current     Protein (g):  78-93 gms (1.25-1.5 gms/62kg); Weight Used for Protein Requirements:  Current        Fluid (ml/day):   ; Method Used for Fluid Requirements:  1 ml/kcal      Nutrition Related Findings:  BLE +1. I/O's: +825      Wounds:  Stage II, Pressure Injury       Current Nutrition Therapies:    ADULT DIET;  Regular  Adult Oral Nutrition Supplement; Standard High Calorie/High Protein Oral Supplement    Anthropometric Measures:  · Height: 5' 10\" (177.8 cm)  · Current Body Weight: 136 lb (61.7 kg)   · Ideal Body Weight: 166 lbs; % Ideal Body Weight     · BMI: 19.5  · Adjusted Body Weight:  ; No Adjustment   · BMI Categories: Underweight (BMI less than 22) age over 72       Nutrition Diagnosis:   · Severe malnutrition related to inadequate protein-energy intake as evidenced by weight loss greater than or equal to 10% in 6 months, poor intake prior to admission, severe loss of subcutaneous fat, severe muscle loss (-15% in 8 months)      Nutrition Interventions:   Food and/or Nutrient Delivery:  Continue Current Diet, Start Oral Nutrition Supplement  Nutrition Education/Counseling:  Education not indicated   Coordination of Nutrition Care:  Continue to monitor while inpatient    Goals:  PO 50% at meals and supp       Nutrition Monitoring and Evaluation:   Behavioral-Environmental Outcomes:  None Identified   Food/Nutrient Intake Outcomes:  Food and Nutrient Intake, Supplement Intake  Physical Signs/Symptoms Outcomes:  Weight, Skin     Discharge Planning:     Too soon to determine     Electronically signed by Kobe Rachel RD, [address] , LD on 6/18/21 at 2:27 PM EDT    Contact: [phone]

## 2021-06-18 NOTE — PROGRESS NOTES
[address] [phone] was active with this pt prior to admit. Discharge planner notified. Will follow.

## 2021-06-18 NOTE — CARE COORDINATION
SW talked with pt regarding the 20% copayment cost for SNF. Pt deferred to his wife for the decision. Spoke with spouse who was agreeable. Precert still pending. Possible discharge over the weekend pending precertificiation.     Electronically signed by JULIA Camarillo, NIKKI on 6/18/2021 at 5:02 PM

## 2021-06-18 NOTE — PLAN OF CARE
Problem: Falls - Risk of:  Goal: Will remain free from falls  Description: Will remain free from falls  Outcome: Ongoing  Note: Pt at risk for falls. Pt educated on fall precautions, verbalized and demonstrated understanding. Will continue to monitor. Problem: Skin Integrity:  Goal: Will show no infection signs and symptoms  Description: Will show no infection signs and symptoms  Outcome: Ongoing  Note: Pt with imparied skin integrity. Wound care following. Venelex and zinc ordered for tx. Incontinence care provided. Site cleansed with soap and bath wipes. External male catheter placed. Pillows and wedge used for support with turning and elevating heels off bed. Pt and wife educated on skin integrity. No further needs at this time.

## 2021-06-18 NOTE — CARE COORDINATION
[place] [place] [address] Continence Nurse  Consult Note       NAME:  Bruno Rich  MEDICAL RECORD NUMBER:  [id number]  AGE: 79 y.o. GENDER: male  : 1940  TODAY'S DATE:  2021    Subjective   Reason for WOCN Evaluation and Assessment: wounds to buttocks      Bruno Rich is a 79 y.o. male referred by:   [x] Physician  [x] Nursing  [] Other:     Wound Identification:  Wound Type: pressure  Contributing Factors: chronic pressure, decreased mobility, incontinence of stool and incontinence of urine    Wound History: present on admission  Current Wound Care Treatment:  Foam, zinc paste    Patient Goal of Care:  [x] Wound Healing  [] Odor Control  [] Palliative Care  [] Pain Control   [] Other:         PAST MEDICAL HISTORY        Diagnosis Date    Blood in stool     Elevated PSA 3/14    Full dentures     Hypertension     Inguinal hernia bilateral, non-recurrent 2019    Bilateral-noted on CT-asymptomatic    Laryngeal cancer ([facility] [address]     resected/uses electronic larynix box    Mitral valve regurgitation 3/14    moderate    Renal insufficiency        PAST SURGICAL HISTORY    Past Surgical History:   Procedure Laterality Date    COLONOSCOPY N/A 10/9/2019    Normal colonoscopy. Extensive hemorrhoids-probably account for bleeding    LARYNGECTOMY  2002    stoma       FAMILY HISTORY    History reviewed. No pertinent family history. SOCIAL HISTORY    Social History     Tobacco Use    Smoking status: Former Smoker     Packs/day: 10.00     Years: 15.00     Pack years: 150.00     Types: Cigarettes     Start date: 1957     Quit date: 2002     Years since quittin.5    Smokeless tobacco: Never Used   Vaping Use    Vaping Use: Never used   Substance Use Topics    Alcohol use: No     Alcohol/week: 0.0 standard drinks    Drug use: No       ALLERGIES    No Known Allergies    MEDICATIONS    No current facility-administered medications on file prior to encounter.      Current Outpatient 20%;Pink/red;Slough 06/18/21 1434   Drainage Amount Moderate 06/18/21 1434   Drainage Description Serosanguinous 06/18/21 0806   Justine-wound Assessment Excoriated;Fragile 06/18/21 1434   Margins Undefined edges 06/18/21 1434   Number of days: 0       Wound 06/17/21 Buttocks Medial;Right medial coccyx, right buttocks (Active)   Wound Image   06/18/21 1434   Wound Etiology Pressure Stage  2 06/18/21 1434   Dressing/Treatment Foam 06/18/21 1434   Wound Length (cm) 7 cm 06/18/21 1434   Wound Width (cm) 4 cm 06/18/21 1434   Wound Surface Area (cm^2) 28 cm^2 06/18/21 1434   Wound Assessment Bleeding;Pink/red 06/18/21 1434   Justine-wound Assessment Fragile 06/18/21 1434   Number of days: 0      patient has wounds to buttocks. PT in room working with patient, patient was incontinent is now getting justine-care. Buttocks on red, with areas of peeling sking. There is  bleeding with section of eschar and slough on left buttocks. Patient is contracted and needs assistance to reposition. Spoke to nurse Burton Romero, venelex ordered for buttocks wounds, also specialty bed ordered. Wound care order set initiated. right buttocks        Left buttocks with area of eschar in middle of wound    Response to treatment:  Well tolerated by patient.      Pain Assessment:  Severity:  0 / 10  Quality of pain: N/A  Wound Pain Timing/Severity: none  Premedicated: No    Plan   Plan of Care: Wound 06/17/21 Buttocks Medial;Left red bleeding across medial, left buttocks-Dressing/Treatment: Foam  Wound 06/17/21 Buttocks Medial;Right medial coccyx, right buttocks-Dressing/Treatment: Foam  Provide justine care of each episode of incontinence  Apply venelex ointment to affected areas twice daily and prn after justine-care  Help patient to turn and reposition every two hours  Use wicking pads in bed, avoid briefs if possible    Specialty Bed Required : Yes   [x] Low Air Loss   [x] Pressure Redistribution  [] Fluid Immersion  [] Bariatric  [] Total Pressure Relief  [] Other:     Current Diet: ADULT DIET;  Regular  Adult Oral Nutrition Supplement; Standard High Calorie/High Protein Oral Supplement  Dietician consult:  Yes    Discharge Plan:  Placement for patient upon discharge: skilled nursing    Patient appropriate for Outpatient [address]: No    Referrals:  []   [] [facility]  [] Supplies  [] Other    Patient/Caregiver Teaching:  Level of patient/caregiver understanding able to:   [] Indicates understanding       [] Needs reinforcement  [] Unsuccessful      [] Verbal Understanding  [] Demonstrated understanding       [] No evidence of learning  [] Refused teaching         [] N/A       Electronically signed by  SHEILA Viera, RN  Wound/Ostomy Care on 6/18/2021 at 2:39 PM

## 2021-06-18 NOTE — PROGRESS NOTES
[facility] PROGRESS NOTE    6/18/2021 9:51 AM        Name: Anna Lowery . Admitted: 6/17/2021  Primary Care Provider: Maisha Kimbrough MD (Tel: [phone])                        Subjective:  . No acute events overnight. Resting well. Pain control. Diet ok. Labs reviewed  Denies any chest pain sob. Reviewed interval ancillary notes    Current Medications  cefepime (MAXIPIME) 2000 mg IVPB minibag, Q12H  losartan (COZAAR) tablet 25 mg, Daily  0.9 % sodium chloride infusion, Continuous  sodium chloride flush 0.9 % injection 5-40 mL, 2 times per day  sodium chloride flush 0.9 % injection 5-40 mL, PRN  0.9 % sodium chloride infusion, PRN  enoxaparin (LOVENOX) injection 40 mg, Daily  ondansetron (ZOFRAN-ODT) disintegrating tablet 4 mg, Q8H PRN   Or  ondansetron (ZOFRAN) injection 4 mg, Q6H PRN  polyethylene glycol (GLYCOLAX) packet 17 g, Daily PRN  acetaminophen (TYLENOL) tablet 650 mg, Q6H PRN   Or  acetaminophen (TYLENOL) suppository 650 mg, Q6H PRN        Objective:  BP (!) 175/76   Pulse 91   Temp 98.5 °F (36.9 °C) (Oral)   Resp 18   Ht 5' 10\" (1.778 m)   Wt 136 lb 14.4 oz (62.1 kg)   SpO2 96%   BMI 19.64 kg/m²     Intake/Output Summary (Last 24 hours) at 6/18/2021 0951  Last data filed at 6/18/2021 0728  Gross per 24 hour   Intake 825.94 ml   Output --   Net 825.94 ml      Wt Readings from Last 3 Encounters:   06/17/21 136 lb 14.4 oz (62.1 kg)   07/13/20 160 lb (72.6 kg)   01/13/20 163 lb (73.9 kg)       General appearance:  Appears comfortable  Eyes: Sclera clear. Pupils equal.  ENT: Moist oral mucosa. Trachea midline, no adenopathy. Cardiovascular: Regular rhythm, normal S1, S2. No murmur. No edema in lower extremities  Respiratory: Not using accessory muscles. Good inspiratory effort. Clear to auscultation bilaterally, no wheeze or crackles.    GI: Abdomen soft, no tenderness, not distended, normal bowel sounds  Musculoskeletal: No cyanosis in digits, neck supple  Neurology: CN 2-12 grossly intact. No speech or motor deficits  Psych: Normal affect. Alert and oriented in time, place and person  Skin: Warm, dry, normal turgor    Labs and Tests:  CBC:   Recent Labs     06/17/21  1441 06/18/21  0738   WBC 14.6* 14.3*   HGB 11.6* 11.5*    325     BMP:    Recent Labs     06/17/21  1441 06/18/21  0738   * 140   K 3.6 4.0   CL 97* 104   CO2 26 24   BUN 25* 23*   CREATININE 0.8 0.9   GLUCOSE 119* 132*     Hepatic:   Recent Labs     06/17/21  1441   AST 28   ALT 17   BILITOT 0.8   ALKPHOS 105       Discussed care with family and patient             Spent 30  minutes with patient and family at bedside and on unit reviewing medical records and labs, spent greater than 50% time counseling patient and family on diagnosis and plan   Problem List  Active Problems:    Failure to thrive in adult  Resolved Problems:    * No resolved hospital problems. *       Assessment & Plan:   1. Cellulitis with decub ulcer  -CT did not show any acute osteo-  -Continue cefepime for now. Goal to switch to oral    Failure to thrive  -With falls  -Patient has not walked in 4 days due to fear of falling  -Significantly deconditioning. -PT OT has been ordered  -Discussed with wife plan for rehab      Diet: ADULT DIET;  Regular  Code:Full Code  DVT PPX lovenox       Noni Longo MD   6/18/2021 9:51 AM

## 2021-06-18 NOTE — PROGRESS NOTES
Occupational Therapy   Occupational Therapy Initial Assessment  Date: 2021   Patient Name: Kathie Rosenbaum  MRN: [id number]     : 1940    Date of Service: 2021    Discharge Recommendations: Kathie Rosenbaum scored a 8/24 on the AM-PAC ADL Inpatient form. Current research shows that an AM-PAC score of 17 or less is typically not associated with a discharge to the patient's home setting. Based on the patient's AM-PAC score and their current ADL deficits, it is recommended that the patient have 3-5 sessions per week of Occupational Therapy at d/c to increase the patient's independence. Please see assessment section for further patient specific details. If patient discharges prior to next session this note will serve as a discharge summary. Please see below for the latest assessment towards goals. OT Equipment Recommendations  Other: To further assess    Assessment   Performance deficits / Impairments: Decreased functional mobility ; Decreased balance;Decreased coordination;Decreased ADL status; Decreased posture;Decreased ROM  Assessment: Patient is not at baseline level following admission due to failure to thrive. Patient lives with wife but wife appears to no longer be able to care for patient.   Treatment Diagnosis: Debility and decreased ADLs due to failure to thrive in adult  Prognosis: Guarded  Decision Making: High Complexity  History: Lives with wife, decreased ability to care for self and stopped walking (chart reports x 4 days)  Exam: Poor hygience, stage 2 on buttocks, dep of 2 rolling, incont of bowel (unaware)  Assistance / Modification: Positioning in bed with multiple pillows, evolving presentation  OT Education: OT Role;Plan of Care  Patient Education: Patient not able to demo or verbalize understanding, needs reinforcement  Barriers to Learning: Language, physical  REQUIRES OT FOLLOW UP: Yes  Activity Tolerance  Activity Tolerance: Treatment limited secondary to medical complications (free text); Patient limited by pain  Activity Tolerance: Limited due to contractures and increased pain with ROM (per facial grimacing)  Safety Devices  Safety Devices in place: Yes  Type of devices: All fall risk precautions in place;Nurse notified; Bed alarm in place; Left in bed;Call light within reach  Restraints  Initially in place: No           Patient Diagnosis(es): The primary encounter diagnosis was Pressure injury of skin of left buttock, unspecified injury stage. A diagnosis of Inability to walk was also pertinent to this visit. has a past medical history of Blood in stool, Elevated PSA, Full dentures, Hypertension, Inguinal hernia bilateral, non-recurrent, Laryngeal cancer (HCC), Mitral valve regurgitation, and Renal insufficiency. has a past surgical history that includes Laryngectomy (2002) and Colonoscopy (N/A, 10/9/2019). Treatment Diagnosis: Debility and decreased ADLs due to failure to thrive in adult      Restrictions  Restrictions/Precautions  Restrictions/Precautions: Fall Risk  Position Activity Restriction  Other position/activity restrictions: Luther Rojo is a 79 y.o. male who presents to the emergency department today for evaluation for a left buttocks wound. The patient has a history of a laryngeal cancer, which has been resected, does use electronics [employer] box. The wife is at bedside, she tells me that the nurse came out for the first time today, noticed a wound to the left buttocks, and the patient was sent to the emergency room for further care and evaluation. With the patient arrives to the ED he has no complaints. He denies any pain to the area. The wife feels that the area has gotten slightly larger. She tells me that the patient had a fall over 1 month ago, and she states that he was afraid of falling, therefore she states that he is no longer exercising, and she states that he is essentially been nonmobile and has been sitting for the past month.   She states that she has been trying to apply dressings at this area, but she feels that the patient needs more help at home. The patient does not have any chest pain. No shortness of breath. No abdominal pain, nausea, vomiting or diarrhea. No dysuria, no other complaints at this time. Subjective   General  Chart Reviewed: Yes  Family / Caregiver Present: No  Diagnosis: Failure to thrive in adult  Subjective  Subjective: Patient supine, pleasant and cooperative. Unable to speak, use laryngeal voice box assist.  Patient Currently in Pain: Denies  Patient Currently in Pain: Denies-- however patient grimacing in pain with any bed mobility/rolling. 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 Additional Comments: Patient stated stopped walking about 4 days ago due to fear of falling-- another note stated 1 month of immobility. Stated \"I was on the couch all the time, did not go to my bed. I am getting a hospital bed\"       Objective   Vision: Within Functional Limits  Hearing: Exceptions to [facility]  Hearing Exceptions: Hard of hearing/hearing concerns    Orientation  Overall Orientation Status: Impaired  Orientation Level: Oriented to person;Disoriented to situation;Disoriented to time;Oriented to place  Observation/Palpation  Observation: Patient contracted in L UE and L LE, unable to straighten knee. Contractures appear much longer then 4 days. Balance  Sitting Balance: Unable to assess(comment)  Standing Balance: Unable to assess(comment)  ADL  Grooming: Minimal assistance (wash face)  UE Bathing: Dependent/Total  LE Bathing: Dependent/Total  UE Dressing: Dependent/Total  LE Dressing: Dependent/Total  Toileting: Dependent/Total  Additional Comments: Patient incont of large amount of loose BM, dependent x 2 to roll left and right. Wound care nurse in to assess buttocks. Patient contractures made positioning difficult due to patient wanting to flex/twist onto right side. Tone RUE  RUE Tone: Normotonic  Tone LUE  LUE Tone: Hypertonic  Coordination  Movements Are Fluid And Coordinated: No  Coordination and Movement description: Fine motor impairments;Decreased speed;Left UE  Quality of Movement Other  Comment: Left UE into flexor contracture. Patient c/o pain with extension and any movement of L UE or L LE. Bed mobility  Rolling to Left: 2 Person assistance  Rolling to Right: 2 Person assistance  Comment: Patient very uncomfortable and grimacing with bed mobility. Performed complete ADL and bed sheet cleanup supine in bed, placed cream provided by wound care nurse and no brief request due to stage 2 on buttocks  Transfers  Stand Step Transfers: Unable to assess  Sit to stand: Unable to assess  Stand to sit: Unable to assess  Vision - Basic Assessment  Prior Vision: No visual deficits  Visual History: No significant visual history  Cognition  Overall Cognitive Status: Exceptions  Arousal/Alertness: Appropriate responses to stimuli  Following Commands:  Follows one step commands with repetition  Attention Span: Appears intact  Memory: Decreased recall of recent events;Decreased recall of biographical Information  Safety Judgement: Decreased awareness of need for safety;Decreased awareness of need for assistance  Insights: Decreased awareness of deficits  Cognition Comment: Chart reports patient stopped walking 4 days ago. However contractures and personal hygiene appear that patient in poor functional state much longer then 4 days. Lives with wife. Chart reports wife overwhelmed with taking care of patient. LUE AROM (degrees)  LUE General AROM: Left UE held into a flexed elbow, wrist and fingers. Patient grimmacing to any extension and movement. Pulled back up into flexion.   RUE AROM (degrees)  RUE AROM : WFL  LUE Strength  LUE Strength Comment: Decreased active movement  RUE Strength  Gross RUE Strength: WFL                   Plan   Plan  Times per week: 3-5  Specific instructions for Next Treatment: cotx with PT  Current Treatment Recommendations: Patient/Caregiver Education & Training, Strengthening, Functional Mobility Training, Safety Education & Training, Positioning, Self-Care / ADL, ROM, Equipment Evaluation, Education, & procurement, Balance Training    G-Code     OutComes Score                                                  AM-PAC Score        AM-[facility] Inpatient Daily Activity Raw Score: 8 (06/18/21 1455)  AM-PAC Inpatient ADL T-Scale Score : 22.86 (06/18/21 1455)  ADL Inpatient CMS 0-100% Score: 85.69 (06/18/21 1455)  ADL Inpatient CMS G-Code Modifier : CM (06/18/21 1455)    Goals  Short term goals  Time Frame for Short term goals: Until discharge  Short term goal 1: Sit EOB with mod assist x 5 minutes for ADL tasks  Short term goal 2: Mod assist UB bathing  Short term goal 3: Mod assist UB dressing  Short term goal 4: Stand pivot transfer with mod of 2 (to further assess if can tolerate due to left LE contracture)  Long term goals  Time Frame for Long term goals : STGs= LTGs  Patient Goals   Patient goals : None stated       Therapy Time   Individual Concurrent Group Co-treatment   Time In 3337         Time Out 1440         Minutes 43              Timed Code Treatment Minutes:  27      Total Treatment Minutes:  [address] [facility], [address] [place] [address]

## 2021-06-18 NOTE — PROGRESS NOTES
Patient arrived on the unit at 2230. Wife at bedside. Patient is alert and oriented. Patients admission and head to toe assessment completed. Vital signs WNL. Bed alarm engaged, call light within reach. . Patient denies any pain at the moment. Patient resting in bed. Will continue to monitor.

## 2021-06-18 NOTE — CARE COORDINATION
Patient has consult for stage 2 to buttocks. Spoke to nurse Kayleigh Porter, specialty bed has been ordered. Wound care order set initiated, venelex ointment ordered. Will continue to follow this admission.   SHEILA Saenz, RN  [facility]

## 2021-06-18 NOTE — DISCHARGE INSTR - COC
Continuity of Care Form    Patient Name: Rowdy Zafar   :  1940  MRN:  [id number]    Admit date:  2021  Discharge date: 21    Code Status Order: Full Code   Advance Directives:   [facility] Documentation       Date/Time Healthcare Directive Type of Healthcare Directive Copy in [address] Agent's Name Healthcare Agent's Phone Number    21  No, patient does not have an advance directive for healthcare treatment -- -- -- -- --            Admitting Physician:  Jayda Atwood MD  PCP: Bryan Russo MD    Discharging Nurse: [address]/Room#: 1VZ-8231/2409-17  Discharging Unit Phone Number: [phone]    Emergency Contact:   Extended Emergency Contact Information  Primary Emergency Contact: Yani Stewart  Address: [address] [place] [address] Phone: [phone]  Relation: Spouse    Past Surgical History:  Past Surgical History:   Procedure Laterality Date    COLONOSCOPY N/A 10/9/2019    Normal colonoscopy.   Extensive hemorrhoids-probably account for bleeding    LARYNGECTOMY  2002    stoma       Immunization History:   Immunization History   Administered Date(s) Administered    Influenza, High Dose (Fluzone 65 yrs and older) 2018    Influenza, Quadv, adjuvanted, 65 yrs +, IM, PF (Fluad) 2020    Influenza, Triv, inactivated, subunit, adjuvanted, IM (Fluad 65 yrs and older) 2020       Active Problems:  Patient Active Problem List   Diagnosis Code    HTN (hypertension) I10    Mitral valve regurgitation I34.0    Chronic mucus hypersecretion, respiratory J39.8    Elevated PSA R97.20    Larynx cancer (HCC) C32.9    Edema of both legs R60.0    Abnormality of gait and mobility R26.9    Venous stasis dermatitis of left lower extremity I87.2    Inguinal hernia, bilateral K40.20    Dry skin dermatitis L85.3    Tinea cruris B35.6    Renal insufficiency N28.9    Weight loss R63.4    Failure to thrive in adult R62.7       Isolation/Infection:   Isolation            No Isolation          Patient Infection Status       None to display            Nurse Assessment:  Last Vital Signs: BP (!) 175/76   Pulse 91   Temp 98.5 °F (36.9 °C) (Oral)   Resp 18   Ht 5' 10\" (1.778 m)   Wt 136 lb 14.4 oz (62.1 kg)   SpO2 96%   BMI 19.64 kg/m²     Last documented pain score (0-10 scale): Pain Level: 0  Last Weight:   Wt Readings from Last 1 Encounters:   06/17/21 136 lb 14.4 oz (62.1 kg)     Mental Status:  oriented, alert and times of confusion or forgetfulness    IV Access:  - None    Nursing Mobility/ADLs:  Walking   Dependent  Transfer  Dependent  Bathing  Dependent  Dressing  Dependent  Toileting  Dependent  Feeding  Dependent  Med Admin  Dependent  Med Delivery   whole, crushed and prefers mixed with applusauce    Wound Care Documentation and Therapy:  Wound 06/17/21 Coccyx Medial red bleeding across mid coccyx (Active)   Wound Etiology Pressure Stage  2 06/17/21 2250   Dressing Status New dressing applied;Clean;Dry; Intact 06/17/21 2250   Wound Cleansed Soap and water 06/17/21 1505   Dressing/Treatment Foam 06/17/21 2250   Dressing Change Due 06/18/21 06/17/21 2250   Wound Assessment Bleeding 06/17/21 2250   Drainage Amount Moderate 06/17/21 2250   Drainage Description Serosanguinous 06/17/21 2250   Fiordaliza-wound Assessment Excoriated;Fragile; Maceration 06/17/21 2250   Margins Undefined edges 06/17/21 2250   Number of days: 0        Elimination:  Continence:   · Bowel: No  · Bladder: No  Urinary Catheter: Insertion Date: 6/28/21 and Indication for Use of Catheter: Hospice/comfort/palliateive care   Colostomy/Ileostomy/Ileal Conduit: No       Date of Last BM: 6/28/21      Intake/Output Summary (Last 24 hours) at 6/18/2021 1021  Last data filed at 6/18/2021 0728  Gross per 24 hour   Intake 825.94 ml   Output --   Net 825.94 ml     No intake/output data recorded.     Safety Concerns: At Risk for Falls and Aspiration Risk    Impairments/Disabilities:      Speech and Contractures - left extremities    Nutrition Therapy:  Current Nutrition Therapy:   - Oral Diet:  Dysphagia 3 advanced    Routes of Feeding: Oral  Liquids: Thin Liquids  Daily Fluid Restriction: no  Last Modified Barium Swallow with Video (Video Swallowing Test): not done    Treatments at the Time of Hospital Discharge:   Respiratory Treatments: duonebs  Oxygen Therapy:  is on oxygen at 8-10 L/min per nasal cannula. misted with trach mask. Ventilator:    - No ventilator support    Rehab Therapies: SN,PT,OT,HHA,MSW  Weight Bearing Status/Restrictions: No weight bearing restirctions  Other Medical Equipment (for information only, NOT a DME order):  hospital bed  Other Treatments: ***    Patient's personal belongings (please select all that are sent with patient):  voice box    RN SIGNATURE:  Electronically signed by Allan Linda RN on 6/29/21 at 11:58 AM EDT    CASE MANAGEMENT/SOCIAL WORK SECTION    Inpatient Status Date: ***    Readmission Risk Assessment Score:  Readmission Risk              Risk of Unplanned Readmission:  13           Discharging to Facility/ Agency   Name: Jorge Carreno will call for Appointment  Phone: [phone]  Fax: [phone]    Dialysis Facility (if applicable)   · Name:  · Address:  · Dialysis Schedule:  · Phone:  · Fax:    / signature: {Esignature:[id number]}    PHYSICIAN SECTION    Prognosis: Good    Condition at Discharge: Stable    Rehab Potential (if transferring to Rehab): Good    Recommended Labs or Other Treatments After Discharge:     Physician Certification: I certify the above information and transfer of Aneudy Fletcher  is necessary for the continuing treatment of the diagnosis listed and that he requires [facility] for greater 30 days.      Update Admission H&P: No change in H&P    PHYSICIAN SIGNATURE:  Electronically signed by Lauro Lesch, MD on 6/21/21 at 12:57 PM EDT

## 2021-06-18 NOTE — CARE COORDINATION
Discharge Planning Assessment    SW discharge planner met with patient to discuss reason for admission, current living situation, and potential needs at the time of discharge. Demographics/Insurance verified:  Yes    Current type of dwelling:  House - non-ambulatory and unable to complete steps. Living arrangements:  w/spouse    Level of function/Support:  Pt and spouse report pt has been immobile for the past 3 - 4 weeks. Spouse stated it was a gradual decline. Spouse is supportive with needs. PCP:  Bill Gonzalez    Last Visit to PCP:  Nov 2020    DME:  Didn't ask. Active with any community resources/agencies/skilled home care: Active w/[facility]. Pt reported they just started earlier this week and came out only once until pt came to the ED yesterday. Medication compliance issues:  No    Financial issues that could impact healthcare:  No    Tentative discharge plan:  SNF. Pt agreeable. PT/OT still pending. SNF list provided. Pt and spouse want [facility] as 1st choice. Then [facility] or [facility]. Referral made to Jayda Ramos [phone], who is covering for Lelia until Monday. Nasrin's fax number is [phone]. Discussed and provided facilities of choice if transition to a skilled nursing facility is required at the time of discharge    Transportation at the time of discharge: Will need ambulance transport.     Electronically signed by JULIA Saucedo, NIKKI on 6/18/2021 at 1:58 PM

## 2021-06-19 NOTE — PROGRESS NOTES
[facility] PROGRESS NOTE    6/19/2021 3:02 PM        Name: Manjeet Carbajal . Admitted: 6/17/2021  Primary Care Provider: Tasha Hunter MD (Tel: [phone])                        Subjective:  . No acute events overnight. Resting well. Pain control. Diet ok. Labs reviewed  Denies any chest pain sob. Reviewed interval ancillary notes    Current Medications  cefepime (MAXIPIME) 2000 mg IVPB minibag, Q12H  Venelex ointment, BID  tamsulosin (FLOMAX) capsule 0.4 mg, Daily  losartan (COZAAR) tablet 25 mg, Daily  0.9 % sodium chloride infusion, Continuous  sodium chloride flush 0.9 % injection 5-40 mL, 2 times per day  sodium chloride flush 0.9 % injection 5-40 mL, PRN  0.9 % sodium chloride infusion, PRN  enoxaparin (LOVENOX) injection 40 mg, Daily  ondansetron (ZOFRAN-ODT) disintegrating tablet 4 mg, Q8H PRN   Or  ondansetron (ZOFRAN) injection 4 mg, Q6H PRN  polyethylene glycol (GLYCOLAX) packet 17 g, Daily PRN  acetaminophen (TYLENOL) tablet 650 mg, Q6H PRN   Or  acetaminophen (TYLENOL) suppository 650 mg, Q6H PRN        Objective:  /63   Pulse 90   Temp 98.4 °F (36.9 °C) (Oral)   Resp 18   Ht 5' 10\" (1.778 m)   Wt 136 lb 14.4 oz (62.1 kg)   SpO2 94%   BMI 19.64 kg/m²     Intake/Output Summary (Last 24 hours) at 6/19/2021 1502  Last data filed at 6/19/2021 0545  Gross per 24 hour   Intake 2050.91 ml   Output --   Net 2050.91 ml      Wt Readings from Last 3 Encounters:   06/17/21 136 lb 14.4 oz (62.1 kg)   07/13/20 160 lb (72.6 kg)   01/13/20 163 lb (73.9 kg)       General appearance:  Appears comfortable  Eyes: Sclera clear. Pupils equal.  ENT: Moist oral mucosa. Trachea midline, no adenopathy. Cardiovascular: Regular rhythm, normal S1, S2. No murmur. No edema in lower extremities  Respiratory: Not using accessory muscles. Good inspiratory effort.  Clear to auscultation bilaterally, no wheeze or crackles. GI: Abdomen soft, no tenderness, not distended, normal bowel sounds  Musculoskeletal: No cyanosis in digits, neck supple  Neurology: CN 2-12 grossly intact. No speech or motor deficits  Psych: Normal affect. Alert and oriented in time, place and person  Skin: Warm, dry, normal turgor    Labs and Tests:  CBC:   Recent Labs     06/17/21  1441 06/18/21  0738 06/19/21  0550   WBC 14.6* 14.3* 12.4*   HGB 11.6* 11.5* 10.7*    325 273     BMP:    Recent Labs     06/17/21  1441 06/18/21  0738 06/19/21  0550   * 140 139   K 3.6 4.0 3.6   CL 97* 104 107   CO2 26 24 25   BUN 25* 23* 25*   CREATININE 0.8 0.9 0.8   GLUCOSE 119* 132* 111*     Hepatic:   Recent Labs     06/17/21  1441   AST 28   ALT 17   BILITOT 0.8   ALKPHOS 105       Discussed care with family and patient             Spent 30  minutes with patient and family at bedside and on unit reviewing medical records and labs, spent greater than 50% time counseling patient and family on diagnosis and plan   Problem List  Active Problems:    Failure to thrive in adult    Severe malnutrition ([facility] [address]  Resolved Problems:    * No resolved hospital problems. *       Assessment & Plan:   1. Cellulitis with decub ulcer  -CT did not show any acute osteo-  -Continue antibiotics we will switch to oral on discharge    Failure to thrive  -With falls  -Patient has not walked in 4 days due to fear of falling  -Significantly deconditioning. -PT OT has been ordered  -Discussed with wife plan for rehab      Diet: ADULT DIET;  Regular  Adult Oral Nutrition Supplement; Standard High Calorie/High Protein Oral Supplement  Code:Full Code  DVT PPX lovenox   Medically stable for discharge pending pre-CERT      Silvano Dinero MD   6/19/2021 3:02 PM

## 2021-06-19 NOTE — PLAN OF CARE
Problem: Falls - Risk of:  Goal: Will remain free from falls  Description: Will remain free from falls  6/18/2021 2320 by Tatiana Rodriguez RN  Outcome: Ongoing  6/18/2021 1816 by Barrington Diggs RN  Outcome: Ongoing  Goal: Absence of physical injury  Description: Absence of physical injury  Outcome: Ongoing     Problem: Skin Integrity:  Goal: Will show no infection signs and symptoms  Description: Will show no infection signs and symptoms  6/18/2021 2320 by Tatiana Rodriguez RN  Outcome: Ongoing  6/18/2021 1816 by Barrington Diggs RN  Outcome: Ongoing  Goal: Absence of new skin breakdown  Description: Absence of new skin breakdown  Outcome: Ongoing     Problem: Nutrition  Goal: Optimal nutrition therapy  6/18/2021 2320 by Tatiana Rodriguez RN  Outcome: Ongoing  6/18/2021 1429 by Nathanael Akers RD, CNSC, LD  Outcome: Ongoing

## 2021-06-20 NOTE — PROGRESS NOTES
[facility] PROGRESS NOTE    6/20/2021 1:01 PM        Name: Be Luke . Admitted: 6/17/2021  Primary Care Provider: Jocelyn Perla MD (Tel: [phone])                        Subjective:  . No acute events overnight. Resting well. Pain control. Diet ok. Labs reviewed  Denies any chest pain sob. Reviewed interval ancillary notes    Current Medications  cyclobenzaprine (FLEXERIL) tablet 5 mg, TID PRN  cefepime (MAXIPIME) 2000 mg IVPB minibag, Q12H  Venelex ointment, BID  tamsulosin (FLOMAX) capsule 0.4 mg, Daily  losartan (COZAAR) tablet 25 mg, Daily  0.9 % sodium chloride infusion, Continuous  sodium chloride flush 0.9 % injection 5-40 mL, 2 times per day  sodium chloride flush 0.9 % injection 5-40 mL, PRN  0.9 % sodium chloride infusion, PRN  enoxaparin (LOVENOX) injection 40 mg, Daily  ondansetron (ZOFRAN-ODT) disintegrating tablet 4 mg, Q8H PRN   Or  ondansetron (ZOFRAN) injection 4 mg, Q6H PRN  polyethylene glycol (GLYCOLAX) packet 17 g, Daily PRN  acetaminophen (TYLENOL) tablet 650 mg, Q6H PRN   Or  acetaminophen (TYLENOL) suppository 650 mg, Q6H PRN        Objective:  /80   Pulse 92   Temp 98.2 °F (36.8 °C) (Temporal)   Resp 18   Ht 5' 10\" (1.778 m)   Wt 136 lb 14.4 oz (62.1 kg)   SpO2 95%   BMI 19.64 kg/m²     Intake/Output Summary (Last 24 hours) at 6/20/2021 1301  Last data filed at 6/20/2021 0653  Gross per 24 hour   Intake 2588.48 ml   Output --   Net 2588.48 ml      Wt Readings from Last 3 Encounters:   06/17/21 136 lb 14.4 oz (62.1 kg)   07/13/20 160 lb (72.6 kg)   01/13/20 163 lb (73.9 kg)       General appearance:  Appears comfortable  Eyes: Sclera clear. Pupils equal.  ENT: Moist oral mucosa. Trachea midline, no adenopathy. Cardiovascular: Regular rhythm, normal S1, S2. No murmur.  No edema in lower extremities  Respiratory: Not using accessory muscles. Good inspiratory effort. Clear to auscultation bilaterally, no wheeze or crackles. GI: Abdomen soft, no tenderness, not distended, normal bowel sounds  Musculoskeletal: No cyanosis in digits, neck supple  Neurology: CN 2-12 grossly intact. No speech or motor deficits  Psych: Normal affect. Alert and oriented in time, place and person  Skin: Warm, dry, normal turgor    Labs and Tests:  CBC:   Recent Labs     06/18/21  0738 06/19/21  0550 06/20/21  0538   WBC 14.3* 12.4* 11.7*   HGB 11.5* 10.7* 10.7*    273 284     BMP:    Recent Labs     06/18/21  0738 06/19/21  0550 06/20/21  0538    139 138   K 4.0 3.6 3.8    107 105   CO2 24 25 25   BUN 23* 25* 24*   CREATININE 0.9 0.8 0.7*   GLUCOSE 132* 111* 102*     Hepatic:   Recent Labs     06/17/21  1441   AST 28   ALT 17   BILITOT 0.8   ALKPHOS 105       Discussed care with family and patient             Spent 30  minutes with patient and family at bedside and on unit reviewing medical records and labs, spent greater than 50% time counseling patient and family on diagnosis and plan   Problem List  Active Problems:    Failure to thrive in adult    Severe malnutrition ([facility] [address]  Resolved Problems:    * No resolved hospital problems. *       Assessment & Plan:   1. Cellulitis with decub ulcer  -CT did not show any acute osteo-  -Continue antibiotics we will switch to oral on discharge    Failure to thrive  -With falls  -Patient has not walked in 4 days due to fear of falling  -Significantly deconditioning. -PT OT has been ordered  -Discussed with wife plan for rehab      Diet: ADULT DIET;  Regular  Adult Oral Nutrition Supplement; Standard High Calorie/High Protein Oral Supplement  Code:Full Code  DVT PPX lovenox   Medically stable for discharge pending pre-CERT      Eliza Henao MD   6/20/2021 1:01 PM

## 2021-06-20 NOTE — PROGRESS NOTES
Pt bathed and repositioned. Exterior catheter replaced. Pt medicated with muscle relaxer to help with discomfort in contracted legs.

## 2021-06-21 NOTE — PROGRESS NOTES
precautions in place;Nurse notified; Bed alarm in place; Left in bed;Call light within reach         Patient Diagnosis(es): The primary encounter diagnosis was Pressure injury of skin of left buttock, unspecified injury stage. A diagnosis of Inability to walk was also pertinent to this visit. has a past medical history of Blood in stool, Elevated PSA, Full dentures, Hypertension, Inguinal hernia bilateral, non-recurrent, Laryngeal cancer (HCC), Mitral valve regurgitation, and Renal insufficiency. has a past surgical history that includes Laryngectomy (2002) and Colonoscopy (N/A, 10/9/2019). Restrictions  Restrictions/Precautions  Restrictions/Precautions: Fall Risk (high fall risk)  Required Braces or Orthoses?: No  Position Activity Restriction  Other position/activity restrictions: Jade Sandoval is a 79 y.o. male who presents to the emergency department today for evaluation for a left buttocks wound. The patient has a history of a laryngeal cancer, which has been resected, does use electronics [employer] box. The wife is at bedside, she tells me that the nurse came out for the first time today, noticed a wound to the left buttocks, and the patient was sent to the emergency room for further care and evaluation. With the patient arrives to the ED he has no complaints. He denies any pain to the area. The wife feels that the area has gotten slightly larger. She tells me that the patient had a fall over 1 month ago, and she states that he was afraid of falling, therefore she states that he is no longer exercising, and she states that he is essentially been nonmobile and has been sitting for the past month. She states that she has been trying to apply dressings at this area, but she feels that the patient needs more help at home. The patient does not have any chest pain. No shortness of breath. No abdominal pain, nausea, vomiting or diarrhea. No dysuria, no other complaints at this time.   Subjective General  Chart Reviewed: Yes  Patient assessed for rehabilitation services?: Yes  Family / Caregiver Present: No  Diagnosis: Failure to thrive in adult  Subjective  Subjective: Patient supine, pleasant and cooperative. Unable to speak, use laryngeal voice box assist.  Vital Signs  Patient Currently in Pain: Denies   Orientation     Objective    ADL  Toileting: Dependent/Total  Additional Comments: Pt incontent of urine, RN present to assist with dressing of sacral wound when rolling. Balance  Sitting Balance: Unable to assess(comment)  Standing Balance: Unable to assess(comment)  Standing Balance  Time: dependent for rolling. Bed mobility  Rolling to Left: 2 Person assistance;Maximum assistance  Rolling to Right: 2 Person assistance;Maximum assistance  Transfers  Stand Step Transfers: Unable to assess  Sit to stand: Unable to assess  Stand to sit: Unable to assess                       Cognition  Overall Cognitive Status: [facility]  Cognition Comment: unclear PLOF and baseline, some confusion noted with timeline. LUE AROM (degrees)  LUE General AROM: no abnormalities noted in LUE ROM, appeared flaccid during presentation. Weakness.   RUE AROM (degrees)  RUE AROM : WFL                 Plan   Plan  Times per week: 3-5  Specific instructions for Next Treatment: cotx with PT  Current Treatment Recommendations: Patient/Caregiver Education & Training, Strengthening, Functional Mobility Training, Safety Education & Training, Positioning, Self-Care / ADL, ROM, Equipment Evaluation, Education, & procurement, Balance Training    Goals  Short term goals  Time Frame for Short term goals: Until discharge  Short term goal 1: Sit EOB with mod assist x 5 minutes for ADL tasks  Short term goal 2: Mod assist UB bathing  Short term goal 3: Mod assist UB dressing  Short term goal 4: Stand pivot transfer with mod of 2 (to further assess if can tolerate due to left LE contracture)  Long term goals  Time Frame for Long term goals : STGs= LTGs  Patient Goals   Patient goals : None stated       Therapy Time   Individual Concurrent Group Co-treatment   Time In 1506         Time Out 1545         Minutes [address] [place], [place]

## 2021-06-21 NOTE — PROGRESS NOTES
Shift assessment complete. Vital signs obtained. AM medications administered per MAR, given whole with apple sauce, pt tolerated well. Pt denies pain at this time and is resting in bed eating breakfast. No further needs expressed. Call light in reach. Fall precautions in place.

## 2021-06-21 NOTE — PROGRESS NOTES
[facility] PROGRESS NOTE    6/21/2021 12:56 PM        Name: Pablo Torres . Admitted: 6/17/2021  Primary Care Provider: Godwin Barnes MD (Tel: [phone])                        Subjective:  . No acute events overnight. Resting well. Pain control. Diet ok. Labs reviewed  Denies any chest pain sob. Reviewed interval ancillary notes    Current Medications  cyclobenzaprine (FLEXERIL) tablet 5 mg, TID PRN  cefepime (MAXIPIME) 2000 mg IVPB minibag, Q12H  Venelex ointment, BID  tamsulosin (FLOMAX) capsule 0.4 mg, Daily  losartan (COZAAR) tablet 25 mg, Daily  0.9 % sodium chloride infusion, Continuous  sodium chloride flush 0.9 % injection 5-40 mL, 2 times per day  sodium chloride flush 0.9 % injection 5-40 mL, PRN  0.9 % sodium chloride infusion, PRN  enoxaparin (LOVENOX) injection 40 mg, Daily  ondansetron (ZOFRAN-ODT) disintegrating tablet 4 mg, Q8H PRN   Or  ondansetron (ZOFRAN) injection 4 mg, Q6H PRN  polyethylene glycol (GLYCOLAX) packet 17 g, Daily PRN  acetaminophen (TYLENOL) tablet 650 mg, Q6H PRN   Or  acetaminophen (TYLENOL) suppository 650 mg, Q6H PRN        Objective:  BP (!) 161/64   Pulse 100   Temp 98.6 °F (37 °C) (Temporal)   Resp 16   Ht 5' 10\" (1.778 m)   Wt 136 lb 14.4 oz (62.1 kg)   SpO2 92%   BMI 19.64 kg/m²     Intake/Output Summary (Last 24 hours) at 6/21/2021 1256  Last data filed at 6/21/2021 1206  Gross per 24 hour   Intake --   Output 200 ml   Net -200 ml      Wt Readings from Last 3 Encounters:   06/17/21 136 lb 14.4 oz (62.1 kg)   07/13/20 160 lb (72.6 kg)   01/13/20 163 lb (73.9 kg)       General appearance:  Appears comfortable  Eyes: Sclera clear. Pupils equal.  ENT: Moist oral mucosa. Trachea midline, no adenopathy. Cardiovascular: Regular rhythm, normal S1, S2. No murmur.  No edema in lower extremities  Respiratory: Not using

## 2021-06-21 NOTE — PROGRESS NOTES
Physical Therapy  Facility/Department: [address] ONCOLOGY  Daily Treatment Note  NAME: Unique Law  : 1940  MRN: [id number]    Date of Service: 2021    Discharge Recommendations:   Unique Law scored a 6/24 on the AM-PAC short mobility form. Current research shows that an AM-PAC score of 17 or less is typically not associated with a discharge to the patient's home setting. Based on the patient's AM-PAC score and their current functional mobility deficits, it is recommended that the patient have 3-5 sessions per week of Physical Therapy at d/c to increase the patient's independence. Please see assessment section for further patient specific details. If patient discharges prior to next session this note will serve as a discharge summary. Please see below for the latest assessment towards goals. 3-5 sessions per week, 24 hour supervision or assist   PT Equipment Recommendations  Equipment Needed: Yes  Mobility Devices: Hospital Bed (lashonda lift)  Other: Defer to next level of care. Do not recommend w/c as patient has not demonstrated safe sitting balance. Assessment   Body structures, Functions, Activity limitations: Decreased functional mobility ; Decreased balance; Increased pain;Decreased ADL status; Decreased ROM; Decreased strength;Decreased high-level IADLs;Decreased safe awareness;Decreased endurance  Assessment: Patient today presents to therapy with the above listed limitations and is currently performing below baseline functional level. Pt today presents with possible flexion contraction of L LE with L LE held in hip and knee flexion while in supine upon PT/OT arrival. Ankle ROM WFL and no ROM deficits noted in LLE. May benefit from further work-up of L knee to determine cause of ROM deficit as patient ambulatory per family few weeks prior to admission. Discussed with nursing Pt participated in rolling today in order to assist nursing with a dressing change for sacral ulcer pressure. Pt able to assist by attempting to reach arms to railings or to hold onto SPT for assistance in maintaining rolls but still requires total assist for all mobility and would require lashonda lift at d/c with long term care plan. Pt would continue to benefit from additional skilled PT services in order to continue assessing pt status, assist with limittions, and aid in returning to baseline functional level. Treatment Diagnosis: decreased functional mobility, dependent for assistance, continued inability to transfer or ambulate  Prognosis: Guarded  Decision Making: Medium Complexity  PT Education: Goals;PT Role;Plan of Care  Patient Education: Pt nodded head to verbalize understanding  Barriers to Learning: None evident. REQUIRES PT FOLLOW UP: Yes  Activity Tolerance  Activity Tolerance: Patient limited by endurance; Patient limited by fatigue;Patient limited by pain  Activity Tolerance: Pt is able to assist with rolling by reaching for railings or reaching for arms to assist. Pt reported great discomfort with mobility but was willing to participate. Patient Diagnosis(es): The primary encounter diagnosis was Pressure injury of skin of left buttock, unspecified injury stage. A diagnosis of Inability to walk was also pertinent to this visit. has a past medical history of Blood in stool, Elevated PSA, Full dentures, Hypertension, Inguinal hernia bilateral, non-recurrent, Laryngeal cancer (HCC), Mitral valve regurgitation, and Renal insufficiency. has a past surgical history that includes Laryngectomy (2002) and Colonoscopy (N/A, 10/9/2019). Restrictions  Restrictions/Precautions  Restrictions/Precautions: Fall Risk (high fall risk)  Required Braces or Orthoses?: No  Position Activity Restriction  Other position/activity restrictions: Srinivas Sanchez is a 79 y.o. male who presents to the emergency department today for evaluation for a left buttocks wound.   The patient has a history of a laryngeal cancer, which has been resected, does use electronics [employer] box. The wife is at bedside, she tells me that the nurse came out for the first time today, noticed a wound to the left buttocks, and the patient was sent to the emergency room for further care and evaluation. With the patient arrives to the ED he has no complaints. He denies any pain to the area. The wife feels that the area has gotten slightly larger. She tells me that the patient had a fall over 1 month ago, and she states that he was afraid of falling, therefore she states that he is no longer exercising, and she states that he is essentially been nonmobile and has been sitting for the past month. She states that she has been trying to apply dressings at this area, but she feels that the patient needs more help at home. The patient does not have any chest pain. No shortness of breath. No abdominal pain, nausea, vomiting or diarrhea. No dysuria, no other complaints at this time. Subjective   General  Chart Reviewed: Yes  Response To Previous Treatment: Patient with no complaints from previous session. Family / Caregiver Present: No  Subjective  Subjective: Pt denies pain at rest.  General Comment  Comments: Pt supine in bed upon arrival.  Pain Screening  Patient Currently in Pain: Denies  Vital Signs  Patient Currently in Pain: Denies       Orientation     Cognition   Cognition  Overall Cognitive Status: WFL  Arousal/Alertness: Appropriate responses to stimuli  Following Commands: Follows one step commands with repetition  Attention Span: Appears intact  Cognition Comment: unclear PLOF and baseline, some confusion noted with timeline.  pt today able to respond and select preferences when given choices by either nodding or putting up fingers to select options  Objective   Bed mobility  Rolling to Left: 2 Person assistance;Maximum assistance  Rolling to Right: 2 Person assistance;Maximum assistance  Transfers  Sit to Stand: Unable to assess  Comment: Pt unable to tolerate more than rolling in bed due to possible contracture of L LE held in knee flexion. Pt R LE in full extension during bed mobility. Ambulation  Ambulation?: No (unable to assess due to possible flexion contracture of L LE)  Stairs/Curb  Stairs?: No            PROM RLE (degrees)  RLE General PROM: Pt today presents with the ability to maintain full knee extension and the ability to move through knee flexion on R LE. Pt R knee in full extension while conducting rolls and when laying supine. AROM RLE (degrees)  RLE General AROM: Not formally assessed. Pt able to flex R knee actively. PROM LLE (degrees)  LLE General PROM: Pt demonstrated possible flexion contracture. Upon arrival to room pt holding knee in hip and knee flexion. Pt able to be moved to hip neutral in regards to hip internal/external rotation. Pt's knee held in flexion but able to be passively moved an additional 25%. AROM LLE (degrees)  LLE General AROM: Pt is able to actively dorsiflex/plantarflex on command. AM-PAC Score  -PAC Inpatient Mobility Raw Score : 6 (06/21/21 1604)  AM-PAC Inpatient T-Scale Score : 23.55 (06/21/21 1604)  Mobility Inpatient CMS 0-100% Score: 100 (06/21/21 1604)  Mobility Inpatient CMS G-Code Modifier : CN (06/21/21 1604)          Goals  Short term goals  Time Frame for Short term goals: Discharge. Short term goal 1: Patient will perform supine-sit w/ MAX x2 assist.  Short term goal 2: Patient will transfer bed-chair w/ lashonda lift. Patient Goals   Patient goals : None verbalized. No goals met this treatment. Plan    Plan  Times per week: 3-5  Times per day: Daily  Current Treatment Recommendations: Strengthening, Safety Education & Training, Patient/Caregiver Education & Training, Functional Mobility Training, Equipment Evaluation, Education, & procurement, Transfer Training, ROM, Pain Management  Safety Devices  Type of devices:  All fall risk precautions in place, Bed alarm in place, Call light

## 2021-06-22 PROBLEM — E44.1 MILD MALNUTRITION (HCC): Chronic | Status: ACTIVE | Noted: 2021-01-01

## 2021-06-22 NOTE — PLAN OF CARE
Nutrition Problem #1: Increased nutrient needs  Intervention: Food and/or Nutrient Delivery: Continue Current Diet, Continue Oral Nutrition Supplement  Nutritional Goals: PO 50% at meals and supp

## 2021-06-22 NOTE — PROGRESS NOTES
Comprehensive Nutrition Assessment    Type and Reason for Visit:  Reassess    Nutrition Recommendations/Plan:   No new nutrition related recommendations at this time     Nutrition Assessment:  Pt with variable PO intake, consuming % of meals depending on if he likes the food. Pt is limited in his choices as he did not bring his dentures to the hospital and needs to order soft foods. He also states he prefers the food at home to the food here; encouraged wife to bring in food. Pt does like Ensure Enlive and is drinking 100% of this TID. Wife at bedside reports that pt's appetite and PO intake were good prior to admission. She does believe he has lost weight but is unsure of the time period. Pt thinks his usual adult body weight was 160 lbs, but there is no actual weight hx to review in EMR. Malnutrition Assessment:  Malnutrition Status:  Mild malnutrition    Context:  Chronic Illness     Findings of the 6 clinical characteristics of malnutrition:  Energy Intake:  No significant decrease in energy intake  Weight Loss:  Unable to assess (No actual weight hx in EMR)     Body Fat Loss:  1 - Mild body fat loss Orbital, Triceps   Muscle Mass Loss:  1 - Mild muscle mass loss Temples (temporalis), Clavicles (pectoralis & deltoids)  Fluid Accumulation:  No significant fluid accumulation     Strength:  Not Performed    Estimated Daily Nutrient Needs:  Energy (kcal):  3264-7844; Weight Used for Energy Requirements:  Current (62 kg)     Protein (g):   grams; Weight Used for Protein Requirements:  Current (62 kg; 1.4-1.7 grams per kg)        Fluid (ml/day):   ; Method Used for Fluid Requirements:  1 ml/kcal      Nutrition Related Findings:  +1 pitting RLE edema. +1 non-pitting LLE edema. LBM 6/19. Wounds:  Stage II, Pressure Injury       Current Nutrition Therapies:    ADULT DIET;  Regular  Adult Oral Nutrition Supplement; Standard High Calorie/High Protein Oral Supplement    Anthropometric Measures:  · Height: 5' 10\" (177.8 cm)  · Current Body Weight: 136 lb 11 oz (62 kg)   · Ideal Body Weight: 166 lbs; % Ideal Body Weight 82.3 %   · BMI: 19.6  · BMI Categories: Normal Weight (BMI 18.5-24. 9)       Nutrition Diagnosis:   · Increased nutrient needs related to increase demand for energy/nutrients as evidenced by wounds      Nutrition Interventions:   Food and/or Nutrient Delivery:  Continue Current Diet, Continue Oral Nutrition Supplement  Nutrition Education/Counseling:  Education not indicated   Coordination of Nutrition Care:  Continue to monitor while inpatient    Goals:  PO 50% at meals and supp       Nutrition Monitoring and Evaluation:   Behavioral-Environmental Outcomes:  None Identified   Food/Nutrient Intake Outcomes:  Food and Nutrient Intake, Supplement Intake  Physical Signs/Symptoms Outcomes:  Skin     Discharge Planning:    Continue Oral Nutrition Supplement     Electronically signed by Janna Polanco RD, LD on 6/22/21 at 2:15 PM EDT    Contact: [phone]

## 2021-06-22 NOTE — CARE COORDINATION
SW informed that peer to peer was denied. Doctor with the insurance company found a note from pt's PCP back in 11/2020 stating that pt was wheelchair bound back then and refusing to walk. SW met with pt in the room to discuss options. Pt deferred for SW to speak with spouse since she is the one taking care of pt at home. SW called spouse and left a message. Spouse may be back to the hospital later this evening.     Electronically signed by JULIA Mccoy LSW on 6/22/2021 at 4:37 PM

## 2021-06-22 NOTE — CARE COORDINATION
SW received a call from [place] at [facility] stating that patient was denied for SNF d/t insurance feeling patient will be LTC. From what pt and spouse reported, pt has only been bed bound for the past 3 to 4 weeks and has had a recent decline. Patient willing and wants to do rehab. Peer to Peer completion to be done by hospitalist by calling [phone], press option 1 then option 2. Reference # [id number]    Sent hospitalist this message to complete peer to peer.     Electronically signed by JULIA Hunter, NIKKI on 6/22/2021 at 12:04 PM

## 2021-06-22 NOTE — PROGRESS NOTES
Lo Jeffery requires a patient lift for the transfer between bed and a wheelchair. Without the use of the lift, the patient would be bed confined. Lo Jeffery was evaluated today and a DME order was entered for a semi-electric hospital bed because he requires assistance for complexity of body positioning needs. Patient requires frequent and immediate positioning changes for relief of pain and care needs related to medical diagnoses. Patient needs variability of bed height requirements to perform patient transfers and for eating, bathing, toileting, personal cares, grooming, hygiene, dressing upper body and dressing lower body. Current body Weight: 136 lb 14.4 oz (62.1 kg). The need for this equipment was discussed with the patient and he understands and is in agreement. [facility] PROGRESS NOTE    6/22/2021 10:46 AM        Name: Lo Jeffery . Admitted: 6/17/2021  Primary Care Provider: Susan Phalen, MD (Tel: [phone]) [address] course: Lo Jeffery is a 79 y.o. male  with history of hypertension, laryngeal cancer status post laryngectomy who presented to ED with sacral decub ulcers. Imaging ruled out acute osteomyelitis. On IV antibiotics. Subjective: Met with patient and his wife at bedside. As per wife, agreeable for patient to be discharged to SNF. Patient denied discomfort or pain. Will communicate with using his laryngeal box.   Reviewed interval ancillary notes    Current Medications  cyclobenzaprine (FLEXERIL) tablet 5 mg, TID PRN  cefepime (MAXIPIME) 2000 mg IVPB minibag, Q12H  Venelex ointment, BID  tamsulosin (FLOMAX) capsule 0.4 mg, Daily  losartan (COZAAR) tablet 25 mg, Daily  0.9 % sodium chloride infusion, Continuous  sodium chloride flush 0.9 % injection 5-40 mL, 2 times per day  sodium chloride flush 0.9 % injection 5-40 mL, PRN  0.9 % sodium chloride infusion, PRN  enoxaparin (LOVENOX) injection 40 mg, Daily  ondansetron (ZOFRAN-ODT) disintegrating tablet 4 mg, Q8H PRN   Or  ondansetron (ZOFRAN) injection 4 mg, Q6H PRN  polyethylene glycol (GLYCOLAX) packet 17 g, Daily PRN  acetaminophen (TYLENOL) tablet 650 mg, Q6H PRN   Or  acetaminophen (TYLENOL) suppository 650 mg, Q6H PRN        Objective:  BP (!) 156/77   Pulse 93   Temp 99.1 °F (37.3 °C) (Temporal)   Resp 18   Ht 5' 10\" (1.778 m)   Wt 136 lb 14.4 oz (62.1 kg)   SpO2 91%   BMI 19.64 kg/m²     Intake/Output Summary (Last 24 hours) at 6/22/2021 1046  Last data filed at 6/21/2021 1855  Gross per 24 hour   Intake 3364.24 ml   Output 200 ml   Net 3164.24 ml      Wt Readings from Last 3 Encounters:   06/17/21 136 lb 14.4 oz (62.1 kg)   07/13/20 160 lb (72.6 kg)   01/13/20 163 lb (73.9 kg)       General appearance:  Appears comfortable  Cardiovascular: Regular rhythm, normal S1, S2. No murmur. No edema in lower extremities  Respiratory: Clear to auscultation bilaterally, no wheeze or crackles. GI: Abdomen soft, no tenderness, not distended, normal bowel sounds  Musculoskeletal: Contractures noted of lower extremities, no cyanosis in digits, neck supple  Neurology: CN 2-12 grossly intact. No speech or motor deficits  Psych: Normal affect. Alert and oriented in time, place and person  Skin: Warm, dry, normal turgor    Labs and Tests:  CBC:   Recent Labs     06/20/21  0538 06/21/21  0457 06/22/21  0506   WBC 11.7* 11.4* 13.7*   HGB 10.7* 10.1* 10.8*    291 324     BMP:    Recent Labs     06/20/21  0538 06/21/21  0457 06/22/21  0506    136 136   K 3.8 3.8 3.9    105 103   CO2 25 26 26   BUN 24* 22* 22*   CREATININE 0.7* 0.7* 0.7*   GLUCOSE 102* 109* 123*     Hepatic:   No results for input(s): AST, ALT, ALB, BILITOT, ALKPHOS in the last 72 hours.     Discussed care with family and patient       Spent 30  minutes with patient and family at bedside and on unit reviewing medical records and labs, spent greater than 50% time counseling patient and family on diagnosis and plan   Problem List  Active Problems:    Failure to thrive in adult    Severe malnutrition ([facility] [address]  Resolved Problems:    * No resolved hospital problems. *       Assessment & Plan:     Sacral decub ulcers:  Imaging rule out acute osteomyelitis. Currently on IV cefepime. Ordered for wound cultures and wound care consult. Failure to thrive  Patient did poorly with PT/OT. Peer to peer review for SNF placement was denied since patient has been chronically immobilized. Patient will need long-term care versus discharg home with home care. Other comorbidities:  Hypertension  Laryngeal cancer status post laryngectomy  Severe malnutrition    Diet: ADULT DIET; Regular  Adult Oral Nutrition Supplement; Standard High Calorie/High Protein Oral Supplement  Code:Full Code  DVT PPX lovenox   Medically stable for discharge.   LTAC versus home with home care      Franko Morton MD   6/22/2021 10:46 AM

## 2021-06-22 NOTE — PROGRESS NOTES
Shift assessment completed. Routine vitals stable. Scheduled medications given. Patient is awake, alert and oriented. Respirations are easy and unlabored. Patient does not appear to be in distress. Call light within reach. Bed alarm on. Pt expresses no further needs at this time.

## 2021-06-22 NOTE — CARE COORDINATION
Spoke with spouse and pt in the room. Informed spouse that insurance denied 2x for SNF. Stated plan was either home w/HHC or LTC. Spouse and pt stated they preferred home. Pt will need a hospital bed and lashonda lift delivered to the house before pt can discharge. Left message for Geryl Cranker for these items. Pt is active w/[facility]. Left message for Josefina Baez regarding discharge possible tomorrow and asked to maximize [address] services. Made online referral to [facility] in [place] for any non-skilled needs they may quality for. Sent hospitalist a perfect serve message for [address] orders and DME orders for lashonda lift and hospital bed. Discharge Plan:  Home w/[facility], lashonda lift, hospital bed and referral to [facility].  DME needs delivered before pt can go home. Keep in touch with Geryl Cranker w/[facility] on when delivery can happen. Pt will need ambulance transport home.     Electronically signed by JULIA Thrasher, LSW on 6/22/2021 at 6:55 PM

## 2021-06-23 NOTE — CONSULTS
SARS-COV-2, SARS-COV-2, SARS-COV-2         Assessment:     The patient is a 79 y.o. old male who  has a past medical history of Blood in stool, Elevated PSA (3/14), Full dentures, Hypertension, Inguinal hernia bilateral, non-recurrent (2019), Laryngeal cancer ([facility] [address] (2002), Mitral valve regurgitation (3/14), and Renal insufficiency (2019). with following problems:    · Persistent low-grade fever and persistent leukocytosis  · Infected left buttock area decubitus ulcer  · Persistent leukocytosis  · Hematuria on admission  · History of laryngeal cancer, s/p laryngectomy  · History of fall 1 month ago, has been bedbound after that  · History of mitral valve regurgitation  · Essential hypertension  · Need for Tdap vaccination      Discussion:      Blood cultures from admission have been negative, unfortunately, no wound culture has been done on the left decubitus wound until today    The patient has had persistent leukocytosis and low-grade fevers. He has been on empiric IV cefepime. He had a CT scan of pelvis without contrast done on 6/18/2021 which showed ulceration at the gluteal cleft area    Serum creatinine 0.6 today    Plan:     Diagnostic Workup:    · Follow-up on blood cultures  · Agree with doing left buttock wound culture  · Continue to follow fever curve, WBC count and blood cultures  · Follow up on liverand renal functions closely  · Check sed rate and CRP today    Antimicrobials:    · The patient has not been on any MRSA coverage since his hospitalization. We will order empiric IV linezolid 600 mg every 12 hour. Platelet count is 356,903 today  · Discontinue Bactrim  · We will stop IV cefepime today  · Will order IV Zosyn 3.375 g every 8 hours  · We will follow up on the culture results and clinical progress and will make further recommendations accordingly. · Start oral probiotics twice daily  · No record of tetanus booster within past decade.  Will order One today  · Pressure ulcer care and prevention precautions  · Continue close vitals monitoring. · Maintain good glycemic control. · Fall precautions. Aspiration precautions. · Continue to watch for new fever or diarrhea. · DVT prophylaxis. · Discussed all above with patient and RN. · Discussed with patient's wife at bedside      Drug Monitoring:    · Continue serial monitoring for antibiotic toxicity as follows: CBC, CMP  · Continue to watch for following: new or worsening fever, hypotension, hives, lip swelling and redness or purulence at vascular access sites. I/v access Management:    · Continue to monitor i.v access sites for erythema, induration, discharge or tenderness. · As always, continue efforts to minimizetubes/lines/drains as clinically appropriate to reduce chances of line associated infections. Current isolation precautions: There are no current isolations documented for this patient. Level of complexity of consult: High     Risk of Complications/Morbidity: High     · Illness(es)/ Infection present that pose threat to life/bodily function. · There is potential for severe exacerbation of infection/side effects of treatment. · Therapy requires intensive monitoring for antimicrobial agent toxicity. Thank you for involving me in the care of your patient. I will continue to follow. If you have any additional questions, please do not hesitate to contact me. Subjective:     Presenting complaint in ER:     Chief Complaint   Patient presents with    Wound Check        HPI: Anna Lowery is a 79 y.o. male patient, who was seen at the request of Dr. Radha Rahman MD.    History was obtained from chart review and the patient. The patient was admitted on 6/17/2021. I have been consulted to see the patient for above mentioned reason(s). The patient has multiple medical comorbidities, and presented to the ER for left buttock wound.     Patient has history of laryngeal cancer, s/p laryngectomy and uses an external [employer] box to communicate. The patient had suffered a fall about a month ago and has been pretty much not mobile after that due to fear of falling. His home health nurse noticed decubitus ulcer on his left buttock area he was admitted from the ER. The patient has been in the hospital for 7 days. He has persistent leukocytosis and low-grade fevers. He has been on empiric IV cefepime    Blood cultures from admission have been negative and have been repeated today by primary    I have been asked for my opinion for management for this patient. Past Medical History: All past medical history reviewed today. Past Medical History:   Diagnosis Date    Blood in stool     Elevated PSA 3/14    Full dentures     Hypertension     Inguinal hernia bilateral, non-recurrent 2019    Bilateral-noted on CT-asymptomatic    Laryngeal cancer ([address] 2002    resected/uses electronic larynix box    Mitral valve regurgitation 3/14    moderate    Renal insufficiency 2019         Past Surgical History: All pastsurgical history was reviewed today. Past Surgical History:   Procedure Laterality Date    COLONOSCOPY N/A 10/9/2019    Normal colonoscopy. Extensive hemorrhoids-probably account for bleeding    LARYNGECTOMY  2002    stoma         Family History: All family history was reviewed today. History reviewed. No pertinent family history. Medications: All current and past medications were reviewed.     Medications Prior to Admission: olmesartan (BENICAR) 20 MG tablet, Take 0.5 tablets by mouth nightly  nystatin (MYCOSTATIN) 622446 UNIT/GM powder, Apply daily to groin rash     linezolid  600 mg Intravenous Q12H    piperacillin-tazobactam  3,375 mg Intravenous Q8H    Tdap-Dtap  0.5 mL Intramuscular Once    Venelex   Topical BID    tamsulosin  0.4 mg Oral Daily    losartan  25 mg Oral Daily    sodium chloride flush  5-40 mL Intravenous 2 times per day    enoxaparin  40 mg Subcutaneous Daily REVIEW OF SYSTEMS:       Review of Systems   Unable to perform ROS: Other     Patient able to speak only with the help of artificial larynx    Objective:       PHYSICAL EXAM:      Vitals:   Vitals:    06/23/21 0908 06/23/21 0916 06/23/21 0929 06/23/21 1129   BP:   (!) 150/72 128/76   Pulse:   98 96   Resp:   18 18   Temp:   97 °F (36.1 °C) 98.8 °F (37.1 °C)   TempSrc:   Temporal Oral   SpO2: (!) 85% 90% 99% 96%   Weight:       Height:           Physical Exam  Vitals and nursing note reviewed. Constitutional:       Appearance: Normal appearance. He is well-developed. HENT:      Head: Normocephalic and atraumatic. Right Ear: External ear normal.      Left Ear: External ear normal.      Nose: Nose normal. No congestion or rhinorrhea. Mouth/Throat:      Mouth: Mucous membranes are moist.      Pharynx: No oropharyngeal exudate or posterior oropharyngeal erythema. Eyes:      General: No scleral icterus. Right eye: No discharge. Left eye: No discharge. Conjunctiva/sclera: Conjunctivae normal.      Pupils: Pupils are equal, round, and reactive to light. Cardiovascular:      Rate and Rhythm: Normal rate and regular rhythm. Pulses: Normal pulses. Heart sounds: No murmur heard. No friction rub. Pulmonary:      Effort: Pulmonary effort is normal. No respiratory distress. Breath sounds: Normal breath sounds. No stridor. No wheezing, rhonchi or rales. Abdominal:      General: Bowel sounds are normal.      Palpations: Abdomen is soft. Tenderness: There is no abdominal tenderness. There is no right CVA tenderness, left CVA tenderness, guarding or rebound. Musculoskeletal:         General: No swelling or tenderness. Normal range of motion. Cervical back: Normal range of motion and neck supple. No rigidity. No muscular tenderness.       Comments: Has contracture of left leg area, left thigh is externally rotated and left knee is flexed at baseline Lymphadenopathy:      Cervical: No cervical adenopathy. Skin:     General: Skin is warm and dry. Coloration: Skin is not jaundiced. Findings: No erythema or rash. Comments: Stage II left buttock decubitus ulcer noted. See pictures below   Neurological:      General: No focal deficit present. Mental Status: He is alert and oriented to person, place, and time. Mental status is at baseline. Motor: No abnormal muscle tone. Psychiatric:         Mood and Affect: Mood normal.         Behavior: Behavior normal.         Thought Content: Thought content normal.                Lines: All vascular access sites are healthy with no local erythema, discharge or tenderness. Intake and output:     I/O last 3 completed shifts: In: 2673.1 [I.V.:2524.6; IV Piggyback:148.5]  Out: 1250 [Urine:1250]    Lab Data:   All available labs were reviewed by me today. CBC:   Recent Labs     06/21/21  0457 06/22/21  0506 06/23/21  0542   WBC 11.4* 13.7* 14.7*   RBC 3.43* 3.68* 3.40*   HGB 10.1* 10.8* 10.0*   HCT 30.3* 32.5* 29.6*    324 307   MCV 88.3 88.2 87.1   MCH 29.6 29.4 29.4   MCHC 33.5 33.4 33.8   RDW 13.1 13.0 12.8        BMP:  Recent Labs     06/21/21  0457 06/22/21  0506 06/23/21  0542    136 136   K 3.8 3.9 4.1    103 103   CO2 26 26 25   BUN 22* 22* 20   CREATININE 0.7* 0.7* 0.6*   CALCIUM 8.9 8.9 8.6   GLUCOSE 109* 123* 117*        Hepatic FunctionPanel:   Lab Results   Component Value Date    ALKPHOS 105 06/17/2021    ALT 17 06/17/2021    AST 28 06/17/2021    PROT 7.0 06/17/2021    BILITOT 0.8 06/17/2021    LABALBU 3.6 06/17/2021       CPK:   Lab Results   Component Value Date    CKTOTAL 68 07/13/2020     ESR: No results found for: SEDRATE  CRP: No results found for: CRP      Imaging: All pertinent images and reports for the current visit were reviewed by meduring this visit.     XR KNEE RIGHT (1-2 VIEWS)   Final Result   Mild degenerative changes without acute osseous abnormality. XR KNEE LEFT (1-2 VIEWS)   Final Result   No definite acute osseous abnormality given single lateral cross-table view. CT PELVIS WO CONTRAST Additional Contrast? None   Final Result   Subcutaneous edema of the gluteal area with focal infiltration of the   subcutaneous fat in the posterior midline just superior to the gluteal cleft   suggesting shallow decubitus ulceration with cellulitis. No specific CT evidence of osteomyelitis or cortical destruction of the   sacrum or coccyx. XR SACRUM COCCYX (MIN 2 VIEWS)   Final Result   Study limited by positioning and osteopenia. No acute osseous abnormality. Consider CT follow-up if there is continued clinical concern. XR CHEST PORTABLE   Final Result   No acute cardiopulmonary disease. XR CHEST PORTABLE    (Results Pending)       Outside records:    Labs, Microbiology, Radiology and pertinent results from Care everywhere, if available, were reviewed as a part ofthe consultation.       Problem list:       Patient Active Problem List   Diagnosis Code    HTN (hypertension) I10    Mitral valve regurgitation I34.0    Chronic mucus hypersecretion, respiratory J39.8    Elevated PSA R97.20    Larynx cancer (HCC) C32.9    Edema of both legs R60.0    Abnormality of gait and mobility R26.9    Venous stasis dermatitis of left lower extremity I87.2    Inguinal hernia, bilateral K40.20    Dry skin dermatitis L85.3    Tinea cruris B35.6    Renal insufficiency N28.9    Weight loss R63.4    Failure to thrive in adult R62.7    Mild malnutrition (HCC) E44.1    Pressure injury of skin of left buttock L89.329    Inability to walk R26.2    Low grade fever R50.9    Bandemia D72.825    Hematuria R31.9    History of laryngeal cancer Z85.21    H/O laryngectomy Z90.02    History of bad fall Z91.81    Need for diphtheria-tetanus-pertussis (Tdap) vaccine Z23         Please note that this chart was generated using Dragon dictation software. Although every effort was made to ensure the accuracy of this automated transcription, some errors in transcription may have occurred inadvertently. If you may need any clarification, please do not hesitate to contact me through EPIC or at the phone number provided below with my electronic signature. Any pictures or media included in this note were obtained after taking informed verbal consent from the patient and with their approval to include those in the patient's medical record.       Rosas Ruth MD, MPH  6/23/21, 1:25 PM EDT   [facility] Infectious Disease   [address] [place] [address], [address] [place] [address]  Office: [phone]  Fax: [phone]  Clinic days:  Tuesday & Thursday

## 2021-06-23 NOTE — PROGRESS NOTES
Patient had episode of complaining of something \"stuck in his throat. \" Patient struggling to breathe and could not cough it up. Respiratory called to bedside to suction patient. Pt's wife updated at this time.

## 2021-06-23 NOTE — CARE COORDINATION
KYLEIGH informed pt would stay one more night for medical issues. Plan now is to discharge home tomorrow. Susan Godinez w/[facility] stated they could not deliver the DME items until Friday the earliest.  Susan Godinez made a referral to Gabbi, [phone], at [facility] who should be able to deliver the items tomorrow morning to pt's address. Spoke w/pt's spouse who stated she was interested in getting a pressurized mattress to help with bed sores for pt who is bedbound currently. KYLEIGH left a message for Gabbi biggs/Shoaib Villanueva asking about this item and confirming lashonda lift and hospital bed can be delivered tomorrow. Waiting call back.     Electronically signed by JULIA Smith, NIKKI on 6/23/2021 at 12:06 PM

## 2021-06-23 NOTE — PROGRESS NOTES
[facility]    6/23/2021 1:40 PM        Name: Stella Gresham . Admitted: 6/17/2021  Primary Care Provider: Lucendia Fabry, MD (Tel: [phone]) [address] course: Stella Gresham is a 79 y.o. male  with history of hypertension, laryngeal cancer status post laryngectomy who presented to ED with sacral decub ulcers. Imaging ruled out acute osteomyelitis. On IV antibiotics. Subjective: Pt seen and examined at bedside. His wife was with him in the room. Overnight, pt was reported to have an episode of dyspnea. RT suctioned secretions out of his stoma and was placed on oxygen/30% FiO2 via stoma.      Reviewed interval ancillary notes    Current Medications  linezolid (ZYVOX) IVPB 600 mg, Q12H  piperacillin-tazobactam (ZOSYN) 3,375 mg in dextrose 5 % 50 mL IVPB extended infusion (mini-bag), Q8H  Tetanus-Diphth-Acell Pertussis (BOOSTRIX) injection 0.5 mL, Once  lactobacillus (CULTURELLE) capsule 1 capsule, BID WC  cyclobenzaprine (FLEXERIL) tablet 5 mg, TID PRN  Venelex ointment, BID  tamsulosin (FLOMAX) capsule 0.4 mg, Daily  losartan (COZAAR) tablet 25 mg, Daily  0.9 % sodium chloride infusion, Continuous  sodium chloride flush 0.9 % injection 5-40 mL, 2 times per day  sodium chloride flush 0.9 % injection 5-40 mL, PRN  0.9 % sodium chloride infusion, PRN  enoxaparin (LOVENOX) injection 40 mg, Daily  ondansetron (ZOFRAN-ODT) disintegrating tablet 4 mg, Q8H PRN   Or  ondansetron (ZOFRAN) injection 4 mg, Q6H PRN  polyethylene glycol (GLYCOLAX) packet 17 g, Daily PRN  acetaminophen (TYLENOL) tablet 650 mg, Q6H PRN   Or  acetaminophen (TYLENOL) suppository 650 mg, Q6H PRN        Objective:  /76   Pulse 96   Temp 98.8 °F (37.1 °C) (Oral)   Resp 18   Ht 5' 10\" (1.778 m)   Wt 136 lb 14.4 oz (62.1 kg)   SpO2 96%   BMI 19.64 kg/m² hospital problems. *       Assessment & Plan:     Sacral decub ulcers:  CT  Imaging rule out acute osteomyelitis. Currently on IV cefepime. Worsening leucocytosis. Ordered for wound cultures and wound care consult. ID consulted to help with abx management. Abx switched to IV Linezolid and Zosyn. Dyspnea, ?aspiration overnight:   Currently needing O2 via stoma. Chest xray portable showed possible aspiration vs pneumonia. Sputum c/s ordered. To be suctioned and sent by RT. IV abx as mentioned above. SLP eval done. MBS ordered as recommended. Worsening leucocytosis:   Source of infection sacral decub ulcer vs aspiration pneumonia. Pan cultures pending. IV antibiotics as per ID. Failure to thrive  Patient did poorly with PT/OT. Peer to peer review for SNF placement was denied since patient has been chronically immobilized. Patient will need long-term care versus discharg home with home care. Other comorbidities:  Hypertension  Laryngeal cancer status post laryngectomy  Severe malnutrition    Diet: ADULT DIET; Regular  Adult Oral Nutrition Supplement; Standard High Calorie/High Protein Oral Supplement  Code:Full Code  DVT PPX lovenox     Dispo: D/c held due to dyspne and worsening leucocytosis.    LTAC versus home with home care      Chapis Cornejo MD   6/23/2021 1:40 PM

## 2021-06-23 NOTE — PROGRESS NOTES
Shift assessment completed. Routine vitals stable. Scheduled medications given. Patient denies any pain. Patient is awake, alert and oriented. Respirations are easy and unlabored. Patient does not appear to be in distress. Call light within reach.

## 2021-06-23 NOTE — PLAN OF CARE
Problem: Falls - Risk of:  Goal: Will remain free from falls  Description: Will remain free from falls  Outcome: Ongoing  Goal: Absence of physical injury  Description: Absence of physical injury  Outcome: Ongoing     Problem: Skin Integrity:  Goal: Will show no infection signs and symptoms  Description: Will show no infection signs and symptoms  Outcome: Ongoing  Goal: Absence of new skin breakdown  Description: Absence of new skin breakdown  Outcome: Ongoing     Problem: Nutrition  Goal: Optimal nutrition therapy  6/22/2021 2144 by Jovanna Perkins RN  Outcome: Ongoing  6/22/2021 1424 by Hansel Saab RD, LD  Outcome: Ongoing

## 2021-06-23 NOTE — PROGRESS NOTES
Facility/Department: [address] ONCOLOGY  Initial Assessment  DYSPHAGIA BEDSIDE SWALLOW EVALUATION     Patient: Manjeet Carbajal   : 1940   MRN: [id number]      Evaluation Date: 2021   Admitting Diagnosis: Failure to thrive in adult [R62.7]  Pain: Pt did not report pain                        H&P: Manjeet Carbajal is a 79 y.o. male who presented with wound check. Patient has this buttock wound ongoing for a while. Patient with history of laryngeal cancer who uses electronic larynx box to talk. Per wife patient has been mostly sitting in the chair and laying in the bed with poor appetite. Patient per se does not have any complaints but wife is slowly overwhelmed. Has been following and patient is afraid to walk because of concern for fall. Essentially normal bowel. Patient denies any fever chills no nausea vomiting diarrhea. Cachectic appearing patient. Recent Chest xray from 21:  No acute cardiopulmonary disease. Modified Barium Swallow Study: None on file     History/Prior Level of Function:   Living Status: Pt lives at home with his wife     Prior Dysphagia History: Per chart review, no history of dysphagia however Pt had a laryngectomy in . Pt's wife stated he consumes regular foods at home and has no difficulty with swallowing. She reported he was told after his surgery in  that he \"can't choke\" and therefore she reported no concerns with his swallowing. Dysphagia Impressions/Diagnosis: Mild Oropharyngeal Dysphagia   Pt was seen sitting partially upright in bed, he was noted to lean to the left. Pt with trach mask over stoma, Pt on 6L of O2.  Pt's wife reported \"wheezing\" this date which is new. Pt uses electrolarynx to communicate. Pt reported difficulty with chewing foods due to absent dentition as his dentures are at home. Various textures were provided to assess swallow function.  Pt demonstrated significantly prolonged mastication with soft solids (meatloaf) with use of 2-3 drinks to assist with oral clearing. Pt's wife reported prolonged mastication due to large bolus amount and missing dentition. Improved tolerance was noted with puree trials. Thin liquids via straw revealed suspected premature bolus loss with mildly delayed swallow initiation, audible swallow was noted. No overt coughing was assessed post swallow, however intermittent suspected increased work of breathing noted. If aspiration is suspected, recommend a modified barium swallow study to further assess the pharyngeal phase of swallowing. Recommended Diet and Intervention 6/23/2021:  Diet Solids Recommendation:  Easy to Chew  Liquid Consistency Recommendation: Thin liquids    Recommended form of Meds:  Meds in puree      Compensatory Swallowing Strategies: Alternate solids/liquids , Upright as possible with all PO intake , Small bites/sips     SHORT TERM DYSPHAGIA GOALS/PLAN OF CARE: Speech therapy for dysphagia tx 3-5 times per week during acute care stay. 1. Pt will functionally tolerate recommended diet with no overt clinical s/s of aspiration  2. Pt will demonstrate understanding of aspiration risk and precautions via education/demonstration with occasional prompting  3. Pt will advance to least restrictive diet as indicated   4. If clinical s/s of aspiration/penetration continue to be noted, Pt will participate in Modified Barium Swallow Study     Dysphagia Therapeutic Intervention:  Diet Tolerance Monitoring , Patient/Family Education , Therapeutic Trials with SLP     Discharge Recommendations: Recommend ongoing speech therapy for dysphagia therapy upon discharge from hospital pending diet tolerance.     Patient Positioning: Upright in bed    Current Diet Level (prior to evaluation):    Respiratory Status:   []Room Air   []O2 via nasal cannula   [x]Other: O2 via trach mask, 6L    Dentition:  []Adequate  []Dentures   []Missing Many Teeth  [x]Edentulous  []Other:    Baseline Vocal Quality:  []Normal  []Dysphonic   []Aphonic   []Hoarse  []Wet  []Weak  [x]Other: Uses electrolarynx    Volitional Swallow:   []Absent   []Delayed     [x]Adequate     []Required use of drink     Oral Mechanism Exam:  [x]WFL []Mild   [] Moderate  []Severe  []To be assessed  Impaired:   []Left side      []Right side    []Labial ROM/Coordination    []Labial Symmetry   []Lingual ROM/Coordination   []Lingual Symmetry  []Gag  []Other:     Oral Phase: []WFL []Mild   [x] Moderate  []Severe  []To be assessed   [x]Impaired/Prolonged Mastication: significantly prolonged    []Spillage Left:   []Spillage Right:  []Pocketing Left:   []Pocketing Right:   [x]Decreased Anterior to Posterior Transit:   [x]Suspected Premature Bolus Loss:   []Lingual/Palatal Residue:   []Other:     Pharyngeal Phase: []WFL [x]Mild   [] Moderate  []Severe  []To be assessed   [x]Delayed Swallow:   [x]Suspected Pharyngeal Pooling:   []Decreased Laryngeal Elevation:   []Absent Swallow:  []Wet Vocal Quality:   []Throat Clearing-Immediate:   []Throat Clearing-Delayed:   []Cough-Immediate:   []Cough-Delayed:  []Change in Vital Signs:  []Suspected Delayed Pharyngeal Clearing:  [x]Other:  Audible swallow       Eating Assistance:  []Independent  []Setup or clean-up assistance   [x] Supervision or touching assistance   [] Partial or moderate assistance   [] Substantial or maximal assistance  [] Dependent       EDUCATION:   Provided education regarding role of SLP, results of assessment, recommendations and general speech pathology plan of care. [x] Pt verbalized understanding and agreement   [] Pt requires ongoing learning   [] No evidence of comprehension      If patient discharges prior to next visit, this note will serve as discharge.      Timed Code Minutes: 0 minutes   Total Treatment Minutes: 25 minutes     Electronically signed by:   Deborah Aceves M.A., [address] [place] [address]  Speech-Language Pathologist

## 2021-06-23 NOTE — CARE COORDINATION
SW left 2 messages with Nabila Reed at [facility], [phone], asking for a return call but never got one. Ana biggs/Rosy reported he received a text from Nabila Reed stating that they will deliver lashonda lift and hospital bed tomorrow. Did not give a specific time. Pt's spouse is interested in a pressurized bed, pads, bed pan and sheets for the hospital bed. Provided spouse with [employer]'s phone number. Case Mgmt will follow up with doctor and discharge for tomorrow.      Electronically signed by JULIA Mahmood, NIKKI on 6/23/2021 at 5:35 PM

## 2021-06-24 NOTE — PROGRESS NOTES
Shift assessment complete. VSS. Scheduled medications given. No complaints of pain. Call light in reach.  No further needs

## 2021-06-24 NOTE — PROGRESS NOTES
Stoma care done w/o complication     88/02/52 1940   Oxygen Therapy/Pulse Ox   O2 Therapy Oxygen humidified   O2 Device Trach mask   O2 Flow Rate (L/min) 8 L/min   FiO2  30 %   Resp 18   SpO2 95 %   Pulse Oximeter Device Mode Continuous   Pulse Oximeter Device Location Finger

## 2021-06-24 NOTE — CARE COORDINATION
Discharge planning note:    Spoke with Ramona You at [facility] - equipment being delivered between 11 - 4pm today for home discharge. SNF was denied by insurance. Patient only has Medicare B and then [facility] [place].      Home discharge being planned:  Hospital bed & lashonda lift being delivered to home today. May need Home O2 at this point - now requiring 8 L. Will need medical transport to home at discharge. Need to know JESÚS    No discharge today.     Rosaleen Mcburney RN BSN  Case Management  [phone]

## 2021-06-24 NOTE — PROGRESS NOTES
Physical/Occupational Therapy  Sridevi Clemons  PT/OT treatment attempted. Pt currently [place] to [facility]. PT/OT will follow-up with pt as schedule allows.   Thank you,  Jessica Brennan, PT, DPT, [id number]  [facility], [address] [place] [address], OTR/L [id number]

## 2021-06-24 NOTE — PROGRESS NOTES
Shift assessment completed. Routine vitals stable. Scheduled medications given. Denies any pain. Patient is awake, alert and oriented. Wife at bedside. Respirations are easy and unlabored. Patient does not appear to be in distress. Call light within reach.

## 2021-06-24 NOTE — PROGRESS NOTES
[facility]    6/24/2021 9:25 AM        Name: Cortez Wilde . Admitted: 6/17/2021  Primary Care Provider: Candis Yost MD (Tel: [phone]) [address] course: Cortez Wilde is a 79 y.o. male  with history of hypertension, laryngeal cancer status post laryngectomy who presented to ED with sacral decub ulcers. Imaging ruled out acute osteomyelitis. On IV antibiotics. Subjective: Pt seen and examined at bedside. Overnight, remained on 8L, 30% FiO2. Desatting off of oxygen. Pt upset that he can't go home today. Explained the need for close monitoring, poss bronch and iv antibiotics. Pt remained silent.      Reviewed interval ancillary notes    Current Medications  linezolid (ZYVOX) IVPB 600 mg, Q12H  piperacillin-tazobactam (ZOSYN) 3,375 mg in dextrose 5 % 50 mL IVPB extended infusion (mini-bag), Q8H  lactobacillus (CULTURELLE) capsule 1 capsule, BID WC  cyclobenzaprine (FLEXERIL) tablet 5 mg, TID PRN  Venelex ointment, BID  tamsulosin (FLOMAX) capsule 0.4 mg, Daily  losartan (COZAAR) tablet 25 mg, Daily  0.9 % sodium chloride infusion, Continuous  sodium chloride flush 0.9 % injection 5-40 mL, 2 times per day  sodium chloride flush 0.9 % injection 5-40 mL, PRN  0.9 % sodium chloride infusion, PRN  enoxaparin (LOVENOX) injection 40 mg, Daily  ondansetron (ZOFRAN-ODT) disintegrating tablet 4 mg, Q8H PRN   Or  ondansetron (ZOFRAN) injection 4 mg, Q6H PRN  polyethylene glycol (GLYCOLAX) packet 17 g, Daily PRN  acetaminophen (TYLENOL) tablet 650 mg, Q6H PRN   Or  acetaminophen (TYLENOL) suppository 650 mg, Q6H PRN        Objective:  /74   Pulse 90   Temp 98.7 °F (37.1 °C) (Oral)   Resp 18   Ht 5' 10\" (1.778 m)   Wt 136 lb 14.4 oz (62.1 kg)   SpO2 92%   BMI 19.64 kg/m²     Intake/Output Summary (Last 24 hours) at 6/24/2021 7422  Last data filed at 6/24/2021 8711  Gross per 24 hour   Intake 3162.4 ml   Output 325 ml   Net 2837.4 ml      Wt Readings from Last 3 Encounters:   06/17/21 136 lb 14.4 oz (62.1 kg)   07/13/20 160 lb (72.6 kg)   01/13/20 163 lb (73.9 kg)       General appearance: frail  male, chronically ill appearing  Cardiovascular: Regular rhythm, normal S1, S2. No murmur. No edema in lower extremities  Respiratory: 8L O2 via stoma, decreased bibasilar breath sounds, no wheeze or crackles. GI: Abdomen soft, no tenderness, not distended, normal bowel sounds  Musculoskeletal: Contractures noted of lower extremities, no cyanosis in digits, neck supple  Neurology: CN 2-12 grossly intact. No speech or motor deficits  Psych: Normal affect. Alert and oriented in time, place and person  Skin: Warm, dry, normal turgor, sacral decub ulcer stage 2    Labs and Tests:  CBC:   Recent Labs     06/22/21  0506 06/23/21  0542 06/24/21  0529   WBC 13.7* 14.7* 14.3*   HGB 10.8* 10.0* 9.3*    307 329     BMP:    Recent Labs     06/22/21  0506 06/23/21  0542 06/24/21  0529    136 133*   K 3.9 4.1 3.9    103 101   CO2 26 25 26   BUN 22* 20 20   CREATININE 0.7* 0.6* 0.7*   GLUCOSE 123* 117* 120*     Hepatic:   No results for input(s): AST, ALT, ALB, BILITOT, ALKPHOS in the last 72 hours. Discussed care with family and patient       Spent 30  minutes with patient and family at bedside and on unit reviewing medical records and labs, spent greater than 50% time counseling patient and family on diagnosis and plan   Problem List  Active Problems:    Failure to thrive in adult    Mild malnutrition ([address]    Pressure injury of skin of left buttock    Inability to walk    Low grade fever    Bandemia    Hematuria    History of laryngeal cancer    H/O laryngectomy    History of bad fall    Need for diphtheria-tetanus-pertussis (Tdap) vaccine  Resolved Problems:    * No resolved hospital problems.  *       Assessment & Plan: Sacral decub ulcers:  CT  Imaging rule out acute osteomyelitis. Pending wound cultures. wound care consult. ID consulted. on IV Linezolid and Zosyn. Acute hypoxic resp failure:   Currently needing 8L O2 via stoma. Chest xray portable showed possible aspiration vs pneumonia. Sputum c/s ordered. IV abx as mentioned above. Pulmonology consulted to assess the need for bronch. Aspiration, poss dysphagia:   SLP eval done. MBS ordered as recommended. Worsening leucocytosis:   Source of infection sacral decub ulcer vs aspiration pneumonia. Pan cultures pending. IV antibiotics as per ID. Failure to thrive  Patient did poorly with PT/OT. Peer to peer review for SNF placement was denied since patient has been chronically immobilized. Patient will need long-term care versus discharg home with home care. Other comorbidities:  Hypertension  Laryngeal cancer status post laryngectomy  Severe malnutrition    Diet: Adult Oral Nutrition Supplement; Standard High Calorie/High Protein Oral Supplement  ADULT DIET; Easy to Chew  Code:Full Code  DVT PPX lovenox     Dispo: D/c held due to dyspne and worsening leucocytosis.    Discharge to [facility] vs home with home care      Isra Mitchell MD   6/24/2021 9:25 AM

## 2021-06-24 NOTE — CONSULTS
P Pulmonary and Critical Care   Consult Note      Reason for Consult: Acute hypoxemic respiratory failure, right lower lobe pneumonia  Requesting Physician: Dr Torres Duty:   [facility] / HPI:                The patient is a 79 y.o. male with significant past medical history of:      Diagnosis Date    Blood in stool     Elevated PSA 3/14    Full dentures     Hypertension     Inguinal hernia bilateral, non-recurrent 2019    Bilateral-noted on CT-asymptomatic    Laryngeal cancer ([address] 2002    resected/uses electronic larynix box    Mitral valve regurgitation 3/14    moderate    Renal insufficiency 2019     Patient originally presented to the hospital 6/17/2021 with failure to thrive and decubitus ulceration of the sacrum. He does have a history of laryngeal carcinoma and is status post laryngectomy from 2002. He has had poor appetite and some weight loss as well. He was being treated for his decubitus ulcer. More recently, he has had worsening hypoxemia requiring now 6 L/min trach collar to maintain saturations. Chest imaging yesterday showed new right lower lobe infiltrate. There is concern for possible aspiration. Past Surgical History:        Procedure Laterality Date    COLONOSCOPY N/A 10/9/2019    Normal colonoscopy.   Extensive hemorrhoids-probably account for bleeding    LARYNGECTOMY  2002    stoma     Current Medications:    Current Facility-Administered Medications: linezolid (ZYVOX) IVPB 600 mg, 600 mg, Intravenous, Q12H  piperacillin-tazobactam (ZOSYN) 3,375 mg in dextrose 5 % 50 mL IVPB extended infusion (mini-bag), 3,375 mg, Intravenous, Q8H  lactobacillus (CULTURELLE) capsule 1 capsule, 1 capsule, Oral, BID WC  cyclobenzaprine (FLEXERIL) tablet 5 mg, 5 mg, Oral, TID PRN  Venelex ointment, , Topical, BID  tamsulosin (FLOMAX) capsule 0.4 mg, 0.4 mg, Oral, Daily  losartan (COZAAR) tablet 25 mg, 25 mg, Oral, Daily  0.9 % sodium chloride infusion, , Intravenous, Continuous  sodium chloride flush 0.9 % injection 5-40 mL, 5-40 mL, Intravenous, 2 times per day  sodium chloride flush 0.9 % injection 5-40 mL, 5-40 mL, Intravenous, PRN  0.9 % sodium chloride infusion, 25 mL, Intravenous, PRN  enoxaparin (LOVENOX) injection 40 mg, 40 mg, Subcutaneous, Daily  ondansetron (ZOFRAN-ODT) disintegrating tablet 4 mg, 4 mg, Oral, Q8H PRN **OR** ondansetron (ZOFRAN) injection 4 mg, 4 mg, Intravenous, Q6H PRN  polyethylene glycol (GLYCOLAX) packet 17 g, 17 g, Oral, Daily PRN  acetaminophen (TYLENOL) tablet 650 mg, 650 mg, Oral, Q6H PRN **OR** acetaminophen (TYLENOL) suppository 650 mg, 650 mg, Rectal, Q6H PRN    No Known Allergies    Social History:    TOBACCO:   reports that he quit smoking about 18 years ago. His smoking use included cigarettes. He started smoking about 63 years ago. He has a 150.00 pack-year smoking history. He has never used smokeless tobacco.  ETOH:   reports no history of alcohol use. Patient currently lives independently  Environmental/chemical exposure: None known    Family History:   History reviewed. No pertinent family history. REVIEW OF SYSTEMS:    CONSTITUTIONAL:  negative for fevers, chills, diaphoresis, activity change, appetite change, fatigue, night sweats and unexpected weight change.    EYES:  negative for blurred vision, eye discharge, visual disturbance and icterus  HEENT:  negative for hearing loss, tinnitus, ear drainage, sinus pressure, nasal congestion, epistaxis and snoring  RESPIRATORY:  See HPI  CARDIOVASCULAR:  negative for chest pain, palpitations, exertional chest pressure/discomfort, edema, syncope  GASTROINTESTINAL:  negative for nausea, vomiting, diarrhea, constipation, blood in stool and abdominal pain  GENITOURINARY:  negative for frequency, dysuria, urinary incontinence, decreased urine volume, and hematuria  HEMATOLOGIC/LYMPHATIC:  negative for easy bruising, bleeding and lymphadenopathy  ALLERGIC/IMMUNOLOGIC:  negative for recurrent infections, angioedema, anaphylaxis and drug reactions  ENDOCRINE:  negative for weight changes and diabetic symptoms including polyuria, polydipsia and polyphagia  MUSCULOSKELETAL:  negative for  pain, joint swelling, decreased range of motion and muscle weakness  NEUROLOGICAL:  negative for headaches, slurred speech, unilateral weakness  PSYCHIATRIC/BEHAVIORAL: negative for hallucinations, behavioral problems, confusion and agitation. Objective:   PHYSICAL EXAM:      VITALS:  /74   Pulse 90   Temp 98.7 °F (37.1 °C) (Oral)   Resp 18   Ht 5' 10\" (1.778 m)   Wt 136 lb 14.4 oz (62.1 kg)   SpO2 92%   BMI 19.64 kg/m²      24HR INTAKE/OUTPUT:      Intake/Output Summary (Last 24 hours) at 6/24/2021 1040  Last data filed at 6/24/2021 2921  Gross per 24 hour   Intake 3162.4 ml   Output 325 ml   Net 2837.4 ml     CONSTITUTIONAL:  awake, alert, cooperative, no apparent distress, and appears stated age  NECK:  Supple, symmetrical, trachea midline, no adenopathy, thyroid symmetric, not enlarged and no tenderness, skin normal  LUNGS:  no increased work of breathing and diminished to auscultation. No accessory muscle use  CARDIOVASCULAR: S1 and S2, no edema and no JVD  ABDOMEN:  normal bowel sounds, non-distended and no masses palpated, and no tenderness to palpation. No hepatospleenomegaly  LYMPHADENOPATHY:  no axillary or supraclavicular adenopathy. No cervical adnenopathy  PSYCHIATRIC: Oriented to person place and time. No obvious depression or anxiety. MUSCULOSKELETAL: No obvious misalignment or effusion of the joints. No clubbing, cyanosis of the digits. SKIN:  normal skin color, texture, turgor and no redness, warmth, or swelling.  No palpable nodules    DATA:    Old records have been reviewed    CBC:  Recent Labs     06/22/21  0506 06/23/21  0542 06/24/21  0529   WBC 13.7* 14.7* 14.3*   RBC 3.68* 3.40* 3.18*   HGB 10.8* 10.0* 9.3*   HCT 32.5* 29.6* 27.8*    307 329   MCV 88.2 87.1 87.4   MCH 29.4 29.4 29.4   MCHC 33.4 33.8 33.6   RDW 13.0 12.8 12.8      BMP:  Recent Labs     06/22/21  0506 06/23/21  0542 06/24/21  0529    136 133*   K 3.9 4.1 3.9    103 101   CO2 26 25 26   BUN 22* 20 20   CREATININE 0.7* 0.6* 0.7*   CALCIUM 8.9 8.6 8.4   GLUCOSE 123* 117* 120*      ABG:  No results for input(s): PHART, RHH8YGL, PO2ART, QGO0OWK, E8YFEGMX, BEART in the last 72 hours. Procalcitonin  No results for input(s): PROCAL in the last 72 hours. No results found for: BNP  Lab Results   Component Value Date    CKTOTAL 68 07/13/2020    TROPONINI <0.01 06/17/2021           Radiology Review:  All pertinent images / reports were reviewed as a part of this visit. Assessment:     1. Acute hypoxemic respiratory failure  2. Aspiration pneumonia right lower lobe  3. Status post laryngectomy for laryngeal carcinoma      Plan:     I have reviewed laboratories, medical records and images for this visit  I reviewed chest imaging. X-ray yesterday shows new right lower lobe infiltrate not present on prior imaging of 6/17/2021  I also reviewed CT imaging from 2019. This does reveal evidence of hyperinflation and centrilobular emphysema. Given his status with laryngectomy, certainly at risk for aspiration. Now on Zosyn over concern for aspiration pneumonia  Has a poor cough  Discussed bronchoscopy with the patient. This would help clear secretion and any foreign material from the airway  Anticipate a.m. bronchoscopy. Obtain procalcitonin  Start him on scheduled nebulizer treatments and chest vest  Continue trach collar oxygen.

## 2021-06-24 NOTE — PROGRESS NOTES
RN notified Selvin Jacobs MD of patient complaints of pain when being repositioned in bed.  Waiting for response

## 2021-06-24 NOTE — PROGRESS NOTES
Patient refusing to be repositioned or changed. The RN and PCA have offered to change and reposition the patient several times. This RN educated the patient on the importance of repositioning and having a clean brief to prevent bed sores and wounds. Patient still refusing. RN and PCA will continue to offer the patient care. Call light in reach. No further needs expressed.

## 2021-06-24 NOTE — PROGRESS NOTES
Routine vitals stable. Scheduled medications given. Pt denies any further needs at this time. Call light within reach. Bed alarm on. Family at bedside. Will continue to monitor.

## 2021-06-24 NOTE — PROGRESS NOTES
Infectious Diseases   Progress Note      Admission Date: 6/17/2021  Hospital Day: Hospital Day: 8   Attending: Flory Spence MD  Date of service: 6/24/2021     Chief complaint/ Reason for consult:     · Persistent low-grade fever and persistent leukocytosis  · Infected left buttock area decubitus ulcer  · Persistent leukocytosis  · Hematuria on admission  · History of laryngeal cancer, s/p laryngectomy    Microbiology:        I have reviewed allavailable micro lab data and cultures    · Blood culture (2/2) - collected on 6/17/2021: Negative  · Blood culture 2 out of 2: Collected on 6/20/2021: In process  · Buttock abscess wound culture  - collected on 6/23/2021: In process      Antibiotics and immunizations:       Current antibiotics: All antibiotics and their doses were reviewed by me    Recent Abx Admin                   linezolid (ZYVOX) IVPB 600 mg (mg) 600 mg New Bag 06/24/21 1544     600 mg New Bag  0126    piperacillin-tazobactam (ZOSYN) 3,375 mg in dextrose 5 % 50 mL IVPB extended infusion (mini-bag) (mg) 3,375 mg New Bag 06/24/21 0852     3,375 mg New Bag  0230     3,375 mg New Bag 06/23/21 1756                  Immunization History: All immunization history was reviewed by me today. Immunization History   Administered Date(s) Administered    Influenza, High Dose (Fluzone 65 yrs and older) 12/04/2018    Influenza, Quadv, adjuvanted, 65 yrs +, IM, PF (Fluad) 11/18/2020    Influenza, Triv, inactivated, subunit, adjuvanted, IM (Fluad 65 yrs and older) 01/13/2020    Tdap (Boostrix, Adacel) 06/23/2021       Known drug allergies: All allergies were reviewed and updated    No Known Allergies    Social history:     Social History:  All social andepidemiologic history was reviewed and updated by me today as needed. · Tobacco use:   reports that he quit smoking about 18 years ago. His smoking use included cigarettes. He started smoking about 63 years ago. He has a 150.00 pack-year smoking history. He has never used smokeless tobacco.  · Alcohol use:   reports no history of alcohol use. · Currently lives in: [facility]  ·  reports no history of drug use. COVID VACCINATION AND LAB RESULT RECORDS:     Internal Administration   First Dose      Second Dose           Last COVID Lab No results found for: SARS-COV-2, SARS-COV-2 RNA, SARS-COV-2, SARS-COV-2, SARS-COV-2 BY PCR, SARS-COV-2, SARS-COV-2, SARS-COV-2         Assessment:     The patient is a 79 y.o. old male who  has a past medical history of Blood in stool, Elevated PSA (3/14), Full dentures, Hypertension, Inguinal hernia bilateral, non-recurrent (2019), Laryngeal cancer ([facility] [address] (2002), Mitral valve regurgitation (3/14), and Renal insufficiency (2019). with following problems:    · Persistent low-grade fever and persistent leukocytosis-White cell count is still up  · Infected left buttock area decubitus ulcer-wound cultures in process  · Persistent leukocytosis-this is ongoing  · Hematuria on admission  · History of laryngeal cancer, s/p laryngectomy  · History of fall 1 month ago, has been bedbound after that  · History of mitral valve regurgitation  · Essential hypertension-blood pressure  · Need for Tdap vaccination      Discussion:      The patient is afebrile. I had switched him to IV linezolid and IV Zosyn yesterday. White cell count still 14,300. Sed rate was elevated at 34 and CRP at 75.3. Serum creatinine 0.7    Modified barium swallow study did not show any obvious aspiration, however, chest x-ray reviewed and shows right lower lobe infiltrate concerning for aspiration    Urine Legionella and pneumococcal antigens are negative    Plan:     Diagnostic Workup:    · Will order nasal MRSA probe  · Continue to follow  fever curve, WBC count and blood cultures. · Continue to monitor blood counts, liver and renal function.     Antimicrobials:    · Will continue IV linezolid 600 mg every 12 hours  · Will stop IV Zosyn and start empiric IV meropenem 1 g every 8 hours  · Continue probiotic twice daily  · Decubitus ulcer care and prevention precautions  · We will follow up on the culture results and clinical progress and will make further recommendations accordingly. · Continue close vitals monitoring. · Maintain good glycemic control. · Fall precautions. Aspiration precautions. · Continue to watch for new fever or diarrhea. · DVT prophylaxis. · Discussed all above with patient and RN. Drug Monitoring:    · Continue monitoring for antibiotic toxicity as follows: CBC, CMP *, QTc interval  · Continue to watch for following: new or worsening fever, new hypotension, hives, lip swelling and redness or purulence at vascular access sites. I/v access Management:    · Continue to monitor i.v access sites for erythema, induration, discharge or tenderness. · As always, continue efforts to minimize tubes/lines/drains as clinically appropriate to reduce chances of line associated infections. Patient education and counseling:        · The patient was educated in detail about the side-effects of various antibiotics and things to watch for like new rashes, lip swelling, severe reaction, worsening diarrhea, break through fever etc.  · Discussed patient's condition and what to expect. All of the patient's questions were addressed in a satisfactory manner and patient verbalized understanding all instructions. Level of complexity of visit: High     Risk of Complications/Morbidity: High     · Illness(es)/ Infection present that pose threat to life/bodily function. · There is potential for severe exacerbation of infection/side effects of treatment. · Therapy requires intensive monitoring for antimicrobial agent toxicity.     TIME SPENT TODAY:     - Spent over  37 minutes on visit (including interval history, physical exam, review of data including labs, cultures, imaging, development and implementation of treatment plan and coordination of complex care). More than 50 percent of this includes face-to-face time spent with the patient for counseling and coordination of care. Thank you for involving me in the care of your patient. I will continue to follow. If you have anyadditional questions, please do not hesitate to contact me. Subjective: Interval history: Interval history was obtained from chart review and patient/ RN. The patient is afebrile. White cell count is going up. No diarrhea     REVIEW OF SYSTEMS:      Review of Systems   Unable to perform ROS: Patient nonverbal     History of laryngectomy    Past Medical History: All past medical history reviewed today. Past Medical History:   Diagnosis Date    Blood in stool     Elevated PSA 3/14    Full dentures     Hypertension     Inguinal hernia bilateral, non-recurrent 2019    Bilateral-noted on CT-asymptomatic    Laryngeal cancer ([address] 2002    resected/uses electronic larynix box    Mitral valve regurgitation 3/14    moderate    Renal insufficiency 2019       Past Surgical History: All past surgical history was reviewed today. Past Surgical History:   Procedure Laterality Date    COLONOSCOPY N/A 10/9/2019    Normal colonoscopy. Extensive hemorrhoids-probably account for bleeding    LARYNGECTOMY  2002    stoma       Family History: All family history was reviewed today. History reviewed. No pertinent family history. Objective:       PHYSICAL EXAM:      Vitals:   Vitals:    06/24/21 0842 06/24/21 0910 06/24/21 1120 06/24/21 1545   BP: 132/74  138/72 115/64   Pulse: 90  94 96   Resp: 18  18 20   Temp: 98.7 °F (37.1 °C)  98.7 °F (37.1 °C) 98.4 °F (36.9 °C)   TempSrc: Oral  Oral Oral   SpO2: 92% 92% 98% 96%   Weight:       Height:           Physical Exam  Vitals and nursing note reviewed. Constitutional:       Appearance: Normal appearance. He is well-developed. HENT:      Head: Normocephalic and atraumatic.       Right Ear: External ear normal.      Left Ear: External ear normal.      Nose: Nose normal. No congestion or rhinorrhea. Mouth/Throat:      Mouth: Mucous membranes are moist.      Pharynx: No oropharyngeal exudate or posterior oropharyngeal erythema. Eyes:      General: No scleral icterus. Right eye: No discharge. Left eye: No discharge. Conjunctiva/sclera: Conjunctivae normal.      Pupils: Pupils are equal, round, and reactive to light. Cardiovascular:      Rate and Rhythm: Normal rate and regular rhythm. Pulses: Normal pulses. Heart sounds: No murmur heard. No friction rub. Pulmonary:      Effort: Pulmonary effort is normal. No respiratory distress. Breath sounds: Normal breath sounds. No stridor. No wheezing, rhonchi or rales. Abdominal:      General: Bowel sounds are normal.      Palpations: Abdomen is soft. Tenderness: There is no abdominal tenderness. There is no right CVA tenderness, left CVA tenderness, guarding or rebound. Musculoskeletal:         General: No swelling or tenderness. Normal range of motion. Cervical back: Normal range of motion and neck supple. No rigidity. No muscular tenderness. Lymphadenopathy:      Cervical: No cervical adenopathy. Skin:     General: Skin is warm and dry. Coloration: Skin is not jaundiced. Findings: No erythema or rash. Comments: Left buttock ulcer noted again   Neurological:      General: No focal deficit present. Mental Status: He is alert and oriented to person, place, and time. Mental status is at baseline. Motor: No abnormal muscle tone. Psychiatric:         Mood and Affect: Mood normal.         Behavior: Behavior normal.         Thought Content: Thought content normal.           Lines: All vascular access sites are healthy with no local erythema, discharge or tenderness. Intake and output:    I/O last 3 completed shifts:   In: 3162.4 [I.V.:2421.8; IV Piggyback:740.6]  Out: 525 [Urine:525]    Lab Data:   All available labs and old records have been reviewed by me. CBC:  Recent Labs     06/22/21  0506 06/23/21  0542 06/24/21  0529   WBC 13.7* 14.7* 14.3*   RBC 3.68* 3.40* 3.18*   HGB 10.8* 10.0* 9.3*   HCT 32.5* 29.6* 27.8*    307 329   MCV 88.2 87.1 87.4   MCH 29.4 29.4 29.4   MCHC 33.4 33.8 33.6   RDW 13.0 12.8 12.8        BMP:  Recent Labs     06/22/21  0506 06/23/21  0542 06/24/21  0529    136 133*   K 3.9 4.1 3.9    103 101   CO2 26 25 26   BUN 22* 20 20   CREATININE 0.7* 0.6* 0.7*   CALCIUM 8.9 8.6 8.4   GLUCOSE 123* 117* 120*        Hepatic Function Panel:   Lab Results   Component Value Date    ALKPHOS 105 06/17/2021    ALT 17 06/17/2021    AST 28 06/17/2021    PROT 7.0 06/17/2021    BILITOT 0.8 06/17/2021    LABALBU 3.6 06/17/2021       CPK:   Lab Results   Component Value Date    CKTOTAL 68 07/13/2020     ESR:   Lab Results   Component Value Date    SEDRATE 34 (H) 06/23/2021     CRP:   Lab Results   Component Value Date    CRP 75.3 (H) 06/23/2021           Imaging: All pertinent images and reports for the current visit were reviewed by me during this visit. FL MODIFIED BARIUM SWALLOW W VIDEO   Final Result   Swallowing mechanism grossly within normal limits without evidence of   aspiration. Please see separate speech pathology report for full discussion of findings   and recommendations. XR CHEST PORTABLE   Final Result   New right basilar opacity could represent aspiration, atelectasis or pneumonia         XR KNEE RIGHT (1-2 VIEWS)   Final Result   Mild degenerative changes without acute osseous abnormality. XR KNEE LEFT (1-2 VIEWS)   Final Result   No definite acute osseous abnormality given single lateral cross-table view.          CT PELVIS WO CONTRAST Additional Contrast? None   Final Result   Subcutaneous edema of the gluteal area with focal infiltration of the   subcutaneous fat in the posterior midline just superior to the gluteal cleft   suggesting shallow decubitus ulceration with cellulitis. No specific CT evidence of osteomyelitis or cortical destruction of the   sacrum or coccyx. XR SACRUM COCCYX (MIN 2 VIEWS)   Final Result   Study limited by positioning and osteopenia. No acute osseous abnormality. Consider CT follow-up if there is continued clinical concern. XR CHEST PORTABLE   Final Result   No acute cardiopulmonary disease. Medications: All current and past medications were reviewed.      ipratropium-albuterol  1 ampule Inhalation 4x daily    meropenem  1,000 mg Intravenous Q8H    linezolid  600 mg Intravenous Q12H    lactobacillus  1 capsule Oral BID WC    Venelex   Topical BID    tamsulosin  0.4 mg Oral Daily    losartan  25 mg Oral Daily    sodium chloride flush  5-40 mL Intravenous 2 times per day    enoxaparin  40 mg Subcutaneous Daily        sodium chloride 100 mL/hr at 06/24/21 0851    sodium chloride         cyclobenzaprine, sodium chloride flush, sodium chloride, ondansetron **OR** ondansetron, polyethylene glycol, acetaminophen **OR** acetaminophen      Problem list:       Patient Active Problem List   Diagnosis Code    HTN (hypertension) I10    Mitral valve regurgitation I34.0    Chronic mucus hypersecretion, respiratory J39.8    Elevated PSA R97.20    Larynx cancer (HCC) C32.9    Edema of both legs R60.0    Abnormality of gait and mobility R26.9    Venous stasis dermatitis of left lower extremity I87.2    Inguinal hernia, bilateral K40.20    Dry skin dermatitis L85.3    Tinea cruris B35.6    Renal insufficiency N28.9    Weight loss R63.4    Failure to thrive in adult R62.7    Mild malnutrition (HCC) E44.1    Pressure injury of skin of left buttock L89.329    Inability to walk R26.2    Low grade fever R50.9    Bandemia D72.825    Hematuria R31.9    History of laryngeal cancer Z85.21    H/O laryngectomy Z90.02    History of bad fall Z91.81    Need for diphtheria-tetanus-pertussis (Tdap) vaccine Z23       Please note that this chart was generated using Dragon dictation software. Although every effort was made to ensure the accuracy of this automated transcription, some errors in transcription may have occurred inadvertently. If you may need any clarification, please do not hesitate to contact me through EPIC or at the phone number provided below with my electronic signature. Any pictures or media included in this note were obtained after taking informed verbal consent from the patient and with their approval to include those in the patient's medical record.     Smith Hernandez MD, MPH  6/24/2021 , 4:46 PM   [facility]   [address] [place] [address], [address] [place] [address]  Office: [phone]  Fax: [phone]  Clinic days:  Tuesday & Thursday

## 2021-06-25 NOTE — PROGRESS NOTES
Teaching/Education initiated regarding procedural experience and expectations. Dr. Idania Babcock spoke with patient about procedure.

## 2021-06-25 NOTE — PROGRESS NOTES
Infectious Diseases   Progress Note      Admission Date: 6/17/2021  Hospital Day: Hospital Day: 9   Attending: Raoul Temple MD  Date of service: 6/25/2021     Chief complaint/ Reason for consult:     · Persistent low-grade fever and persistent leukocytosis  · Infected left buttock area decubitus ulcer  · Persistent leukocytosis  · Hematuria on admission  · History of laryngeal cancer, s/p laryngectomy    Microbiology:        I have reviewed allavailable micro lab data and cultures    · Blood culture (2/2) - collected on 6/17/2021: Negative  · Blood culture 2 out of 2: Collected on 6/20/2021: In process  · Buttock abscess wound culture  - collected on 6/23/2021: In process      Antibiotics and immunizations:       Current antibiotics: All antibiotics and their doses were reviewed by me    Recent Abx Admin                   meropenem (MERREM) 1,000 mg in sodium chloride 0.9 % 100 mL IVPB (mini-bag) (mg) 1,000 mg New Bag 06/25/21 1002     1,000 mg New Bag  0115     1,000 mg New Bag 06/24/21 1758    linezolid (ZYVOX) IVPB 600 mg (mg) 600 mg New Bag 06/25/21 0228     600 mg New Bag 06/24/21 1544                  Immunization History: All immunization history was reviewed by me today. Immunization History   Administered Date(s) Administered    Influenza, High Dose (Fluzone 65 yrs and older) 12/04/2018    Influenza, Quadv, adjuvanted, 65 yrs +, IM, PF (Fluad) 11/18/2020    Influenza, Triv, inactivated, subunit, adjuvanted, IM (Fluad 65 yrs and older) 01/13/2020    Tdap (Boostrix, Adacel) 06/23/2021       Known drug allergies: All allergies were reviewed and updated    No Known Allergies    Social history:     Social History:  All social andepidemiologic history was reviewed and updated by me today as needed. · Tobacco use:   reports that he quit smoking about 18 years ago. His smoking use included cigarettes. He started smoking about 63 years ago. He has a 150.00 pack-year smoking history.  He has never used smokeless tobacco.  · Alcohol use:   reports no history of alcohol use. · Currently lives in: [facility]  ·  reports no history of drug use. COVID VACCINATION AND LAB RESULT RECORDS:     Internal Administration   First Dose      Second Dose           Last COVID Lab No results found for: SARS-COV-2, SARS-COV-2 RNA, SARS-COV-2, SARS-COV-2, SARS-COV-2 BY PCR, SARS-COV-2, SARS-COV-2, SARS-COV-2         Assessment:     The patient is a 79 y.o. old male who  has a past medical history of Blood in stool, Elevated PSA (3/14), Full dentures, Hypertension, Inguinal hernia bilateral, non-recurrent (2019), Laryngeal cancer ([facility] [address] (2002), Mitral valve regurgitation (3/14), and Renal insufficiency (2019). with following problems:    · Persistent low-grade fever and persistent leukocytosis-unfortunately, WBC count continues to go up  · Infected left buttock area decubitus ulcer-wound cultures negative so far   · Persistent leukocytosis-WBC count is 15,600 today  · Hematuria on admission  · History of laryngeal cancer, s/p laryngectomy  · History of fall 1 month ago, has been bedbound after that  · History of mitral valve regurgitation  · Essential hypertension-BP okay  · Need for Tdap vaccination      Discussion:      The patient is on IV linezolid and IV meropenem. Unfortunately, white cell count continues to go up despite antibiotic escalation and is 15,600 today. Serum creatinine 0.8. Bilateral buttock wound cultures are negative so far, Gram stain showed gram-positive cocci. He had a bronchoscopy done earlier today    Plan:     Diagnostic Workup:    · Will order CT scan of chest abdomen pelvis with IV contrast due to ongoing leukocytosis  · Check lactate level today  · Continue to follow  fever curve, WBC count and blood cultures. · Continue to monitor blood counts, liver and renal function.     Antimicrobials:    · Will continue empiric IV linezolid 600 mg every 12 hour  · Current IV meropenem 1 g every 8 hour  · Continue oral probiotic twice daily  · Cough and deep breathing exercises  · Bilateral buttock wound care and prevention precautions  · We will follow up on the culture results and clinical progress and will make further recommendations accordingly. · Continue close vitals monitoring. · Maintain good glycemic control. · Fall precautions. Aspiration precautions. · Continue to watch for new fever or diarrhea. · DVT prophylaxis. · Discussed all above with patient's wife and RN. Drug Monitoring:    · Continue monitoring for antibiotic toxicity as follows: CBC, CMP   · Continue to watch for following: new or worsening fever, new hypotension, hives, lip swelling and redness or purulence at vascular access sites. I/v access Management:    · Continue to monitor i.v access sites for erythema, induration, discharge or tenderness. · As always, continue efforts to minimize tubes/lines/drains as clinically appropriate to reduce chances of line associated infections. Level of complexity of visit: High     Risk of Complications/Morbidity: High     · Illness(es)/ Infection present that pose threat to life/bodily function. · There is potential for severe exacerbation of infection/side effects of treatment. · Therapy requires intensive monitoring for antimicrobial agent toxicity. TIME SPENT TODAY:     - Spent over  37 minutes on visit (including interval history, physical exam, review of data including labs, cultures, imaging, development and implementation of treatment plan and coordination of complex care). More than 50 percent of this includes face-to-face time spent with the patient for counseling and coordination of care. Thank you for involving me in the care of your patient. I will continue to follow. If you have anyadditional questions, please do not hesitate to contact me. Subjective:      Interval history: Interval history was obtained from chart review and patient's wife and RN. He is afebrile. Remains nonverbal due to laryngotomy. Seems to be tolerating antibiotics okay     REVIEW OF SYSTEMS:      Review of Systems   Unable to perform ROS: Patient nonverbal     Has history of laryngectomy    Past Medical History: All past medical history reviewed today. Past Medical History:   Diagnosis Date    Blood in stool     Elevated PSA 3/14    Full dentures     Hypertension     Inguinal hernia bilateral, non-recurrent 2019    Bilateral-noted on CT-asymptomatic    Laryngeal cancer ([address] 2002    resected/uses electronic larynix box    Mitral valve regurgitation 3/14    moderate    Renal insufficiency 2019       Past Surgical History: All past surgical history was reviewed today. Past Surgical History:   Procedure Laterality Date    BRONCHOSCOPY N/A 6/25/2021    BRONCHOSCOPY THERAPUTIC ASPIRATION INITIAL VIA TRACH STOMA performed by Juana Benitez MD at [facility] COLONOSCOPY N/A 10/9/2019    Normal colonoscopy. Extensive hemorrhoids-probably account for bleeding    LARYNGECTOMY  2002    stoma       Family History: All family history was reviewed today. History reviewed. No pertinent family history. Objective:       PHYSICAL EXAM:      Vitals:   Vitals:    06/25/21 0905 06/25/21 0910 06/25/21 0915 06/25/21 0945   BP: (!) 90/52 (!) 91/55 (!) 101/59 117/69   Pulse: 86 86 90 89   Resp: 28 27 26 24   Temp:   97.2 °F (36.2 °C) 98.4 °F (36.9 °C)   TempSrc:   Temporal Axillary   SpO2: 95% 95% 97% 98%   Weight:       Height:           Physical Exam  Vitals and nursing note reviewed. Constitutional:       Appearance: Normal appearance. He is well-developed. HENT:      Head: Normocephalic and atraumatic. Right Ear: External ear normal.      Left Ear: External ear normal.      Nose: Nose normal. No congestion or rhinorrhea.       Mouth/Throat:      Mouth: Mucous membranes are moist.      Pharynx: No oropharyngeal exudate or posterior oropharyngeal erythema. Eyes:      General: No scleral icterus. Right eye: No discharge. Left eye: No discharge. Conjunctiva/sclera: Conjunctivae normal.      Pupils: Pupils are equal, round, and reactive to light. Cardiovascular:      Rate and Rhythm: Normal rate and regular rhythm. Pulses: Normal pulses. Heart sounds: No murmur heard. No friction rub. Pulmonary:      Effort: Pulmonary effort is normal. No respiratory distress. Breath sounds: No stridor. No wheezing or rhonchi. Comments: Decreased breath sounds on the right side  Abdominal:      General: Bowel sounds are normal.      Palpations: Abdomen is soft. Tenderness: There is no abdominal tenderness. There is no right CVA tenderness, left CVA tenderness, guarding or rebound. Musculoskeletal:         General: No swelling or tenderness. Normal range of motion. Cervical back: Normal range of motion and neck supple. No rigidity. No muscular tenderness. Lymphadenopathy:      Cervical: No cervical adenopathy. Skin:     General: Skin is warm and dry. Coloration: Skin is not jaundiced. Findings: No erythema or rash. Comments: Ongoing stage I decubitus ulcers on bilateral buttocks   Neurological:      General: No focal deficit present. Mental Status: He is alert and oriented to person, place, and time. Mental status is at baseline. Motor: No abnormal muscle tone. Psychiatric:         Mood and Affect: Mood normal.         Behavior: Behavior normal.         Thought Content: Thought content normal.             Lines: All vascular access sites are healthy with no local erythema, discharge or tenderness. Intake and output:    I/O last 3 completed shifts: In: 2612.7 [I.V.:1812.7; IV ZCISLTDCE:409]  Out: 26 [Urine:525]    Lab Data:   All available labs and old records have been reviewed by me.     CBC:  Recent Labs     06/23/21  0542 06/24/21  0529 06/25/21  0652   WBC 14.7* 14.3* 15.6*   RBC 3.40* 3.18* 3.27*   HGB 10.0* 9.3* 9.7*   HCT 29.6* 27.8* 28.9*    329 379   MCV 87.1 87.4 88.5   MCH 29.4 29.4 29.6   MCHC 33.8 33.6 33.4   RDW 12.8 12.8 13.1        BMP:  Recent Labs     06/23/21  0542 06/24/21  0529 06/25/21  0652    133* 134*   K 4.1 3.9 4.2    101 101   CO2 25 26 26   BUN 20 20 21*   CREATININE 0.6* 0.7* 0.8   CALCIUM 8.6 8.4 8.6   GLUCOSE 117* 120* 121*        Hepatic Function Panel:   Lab Results   Component Value Date    ALKPHOS 105 06/17/2021    ALT 17 06/17/2021    AST 28 06/17/2021    PROT 7.0 06/17/2021    BILITOT 0.8 06/17/2021    LABALBU 3.6 06/17/2021       CPK:   Lab Results   Component Value Date    CKTOTAL 68 07/13/2020     ESR:   Lab Results   Component Value Date    SEDRATE 34 (H) 06/23/2021     CRP:   Lab Results   Component Value Date    CRP 75.3 (H) 06/23/2021           Imaging: All pertinent images and reports for the current visit were reviewed by me during this visit. FL MODIFIED BARIUM SWALLOW W VIDEO   Final Result   Swallowing mechanism grossly within normal limits without evidence of   aspiration. Please see separate speech pathology report for full discussion of findings   and recommendations. XR CHEST PORTABLE   Final Result   New right basilar opacity could represent aspiration, atelectasis or pneumonia         XR KNEE RIGHT (1-2 VIEWS)   Final Result   Mild degenerative changes without acute osseous abnormality. XR KNEE LEFT (1-2 VIEWS)   Final Result   No definite acute osseous abnormality given single lateral cross-table view. CT PELVIS WO CONTRAST Additional Contrast? None   Final Result   Subcutaneous edema of the gluteal area with focal infiltration of the   subcutaneous fat in the posterior midline just superior to the gluteal cleft   suggesting shallow decubitus ulceration with cellulitis. No specific CT evidence of osteomyelitis or cortical destruction of the   sacrum or coccyx.          XR SACRUM COCCYX (MIN 2 VIEWS)   Final Result   Study limited by positioning and osteopenia. No acute osseous abnormality. Consider CT follow-up if there is continued clinical concern. XR CHEST PORTABLE   Final Result   No acute cardiopulmonary disease. Medications: All current and past medications were reviewed.  ipratropium-albuterol  1 ampule Inhalation 4x daily    meropenem  1,000 mg Intravenous Q8H    linezolid  600 mg Intravenous Q12H    lactobacillus  1 capsule Oral BID WC    Venelex   Topical BID    tamsulosin  0.4 mg Oral Daily    losartan  25 mg Oral Daily    sodium chloride flush  5-40 mL Intravenous 2 times per day    enoxaparin  40 mg Subcutaneous Daily        sodium chloride 100 mL/hr at 06/25/21 0800    sodium chloride         cyclobenzaprine, sodium chloride flush, sodium chloride, ondansetron **OR** ondansetron, polyethylene glycol, acetaminophen **OR** acetaminophen      Problem list:       Patient Active Problem List   Diagnosis Code    HTN (hypertension) I10    Mitral valve regurgitation I34.0    Chronic mucus hypersecretion, respiratory J39.8    Elevated PSA R97.20    Larynx cancer (HCC) C32.9    Edema of both legs R60.0    Abnormality of gait and mobility R26.9    Venous stasis dermatitis of left lower extremity I87.2    Inguinal hernia, bilateral K40.20    Dry skin dermatitis L85.3    Tinea cruris B35.6    Renal insufficiency N28.9    Weight loss R63.4    Failure to thrive in adult R62.7    Mild malnutrition (HCC) E44.1    Pressure injury of skin of left buttock L89.329    Inability to walk R26.2    Low grade fever R50.9    Bandemia D72.825    Hematuria R31.9    History of laryngeal cancer Z85.21    H/O laryngectomy Z90.02    History of bad fall Z91.81    Need for diphtheria-tetanus-pertussis (Tdap) vaccine Z23       Please note that this chart was generated using Dragon dictation software.  Although every effort was made to ensure the accuracy

## 2021-06-25 NOTE — PROGRESS NOTES
Received from endo - unresponsive, trach collar @ 6 liters 50%,tachypnea shallow and regular 92 % ,continuous monitoring .

## 2021-06-25 NOTE — ANESTHESIA PRE PROCEDURE
Department of Anesthesiology  Preprocedure Note       Name:  Rubens Michelle   Age:  79 y.o.  :  1940                                          MRN:  [id number]         Date:  2021      Surgeon: Eli Walker):  Arie Monroe MD    Procedure: Procedure(s):  BRONCHOSCOPY DIAGNOSTIC OR CELL [address] ONLY    Medications prior to admission:   Prior to Admission medications    Medication Sig Start Date End Date Taking?  Authorizing Provider   sulfamethoxazole-trimethoprim (BACTRIM DS;SEPTRA DS) 800-160 MG per tablet Take 1 tablet by mouth every 12 hours for 10 days 6/23/21 7/3/21 Yes Rosalia Ibrahim MD   Probiotic Acidophilus [facility]) TABS Take 1 tablet by mouth 2 times daily for 10 days 6/23/21 7/3/21 Yes Rosalia Ibrahim MD   cyclobenzaprine (FLEXERIL) 5 MG tablet Take 1 tablet by mouth 3 times daily as needed for Muscle spasms 21 Yes Tami Carter MD   tamsulosin [facility]) 0.4 MG capsule Take 1 capsule by mouth daily 21  Yes Tami Carter MD   olmesartan (BENICAR) 20 MG tablet Take 0.5 tablets by mouth nightly 21  Yes Cliff Collins MD   nystatin (MYCOSTATIN) 180852 UNIT/GM powder Apply daily to groin rash 20  Yes Cliff Collins MD       Current medications:    Current Facility-Administered Medications   Medication Dose Route Frequency Provider Last Rate Last Admin    ipratropium-albuterol (DUONEB) nebulizer solution 1 ampule  1 ampule Inhalation 4x daily Arie Monroe MD   1 ampule at 21    meropenem (MERREM) 1,000 mg in sodium chloride 0.9 % 100 mL IVPB (mini-bag)  1,000 mg Intravenous Q8H Justyna Garcia MD   Stopped at 21 0145    linezolid (ZYVOX) IVPB 600 mg  600 mg Intravenous Q12H Justyna Garcia MD   Stopped at 21 0328    lactobacillus (CULTURELLE) capsule 1 capsule  1 capsule Oral BID  Taran Rosas MD   1 capsule at 21 1536    cyclobenzaprine (FLEXERIL) tablet 5 mg  5 mg Oral TID PRN Tami Carter MD   5 mg at 21    Venelex ointment   Topical BID Yajaira Sanchez MD   Given at 06/24/21 2013    tamsulosin [facility]) capsule 0.4 mg  0.4 mg Oral Daily Tremaine Mosley MD   0.4 mg at 06/24/21 0844    losartan (COZAAR) tablet 25 mg  25 mg Oral Daily Yajaira Sanchez MD   25 mg at 06/24/21 0844    0.9 % sodium chloride infusion   Intravenous Continuous Tremaine Mosley  mL/hr at 06/25/21 0401 Rate Verify at 06/25/21 0401    sodium chloride flush 0.9 % injection 5-40 mL  5-40 mL Intravenous 2 times per day Yajaira Sanchez MD   10 mL at 06/24/21 2013    sodium chloride flush 0.9 % injection 5-40 mL  5-40 mL Intravenous PRN Tremaine Mosley MD        0.9 % sodium chloride infusion  25 mL Intravenous PRN Yajaira Sanchez MD        enoxaparin (LOVENOX) injection 40 mg  40 mg Subcutaneous Daily Tremaine Mosley MD   40 mg at 06/24/21 0843    ondansetron (ZOFRAN-ODT) disintegrating tablet 4 mg  4 mg Oral Q8H PRN Tremaine Mosley MD        Or    ondansetron (ZOFRAN) injection 4 mg  4 mg Intravenous Q6H PRN Tremaine Mosley MD        polyethylene glycol (GLYCOLAX) packet 17 g  17 g Oral Daily PRN Yajaira Sanchez MD        acetaminophen (TYLENOL) tablet 650 mg  650 mg Oral Q6H PRN Tremaine Mosley MD   650 mg at 06/24/21 1809    Or    acetaminophen (TYLENOL) suppository 650 mg  650 mg Rectal Q6H PRN Tremaine Mosley MD           Allergies:  No Known Allergies    Problem List:    Patient Active Problem List   Diagnosis Code    HTN (hypertension) I10    Mitral valve regurgitation I34.0    Chronic mucus hypersecretion, respiratory J39.8    Elevated PSA R97.20    Larynx cancer (HCC) C32.9    Edema of both legs R60.0    Abnormality of gait and mobility R26.9    Venous stasis dermatitis of left lower extremity I87.2    Inguinal hernia, bilateral K40.20    Dry skin dermatitis L85.3    Tinea cruris B35.6    Renal insufficiency N28.9    Weight loss R63.4    Failure to thrive in adult R62.7    Mild malnutrition (HCC) E44.1    Pressure injury of skin of left buttock R78.075    Inability to walk R26.2    Low grade fever R50.9    Bandemia D72.825    Hematuria R31.9    History of laryngeal cancer Z85.21    H/O laryngectomy Z90.02    History of bad fall Z91.81    Need for diphtheria-tetanus-pertussis (Tdap) vaccine Z23       Past Medical History:        Diagnosis Date    Blood in stool     Elevated PSA 3/14    Full dentures     Hypertension     Inguinal hernia bilateral, non-recurrent 2019    Bilateralnoted on CTasymptomatic    Laryngeal cancer ([address] 2002    resected/uses electronic larynix box    Mitral valve regurgitation 3/14    moderate    Renal insufficiency        Past Surgical History:        Procedure Laterality Date    COLONOSCOPY N/A 10/9/2019    Normal colonoscopy.   Extensive hemorrhoidsprobably account for bleeding    LARYNGECTOMY      stoma       Social History:    Social History     Tobacco Use    Smoking status: Former Smoker     Packs/day: 10.00     Years: 15.00     Pack years: 150.00     Types: Cigarettes     Start date: 1957     Quit date: 2002     Years since quittin.5    Smokeless tobacco: Never Used   Substance Use Topics    Alcohol use: No     Alcohol/week: 0.0 standard drinks                                Counseling given: Not Answered      Vital Signs (Current):   Vitals:    21 2322 21 2341 21 0305 21 0412   BP: (!) 147/87  (!) 149/92    Pulse: 98  96    Resp: 20 16 16 16   Temp: 99.3 °F (37.4 °C)  99.1 °F (37.3 °C)    TempSrc: Temporal  Temporal    SpO2: 98% 97% 98% 95%   Weight:       Height:                                                  BP Readings from Last 3 Encounters:   21 (!) 149/92   20 122/77   20 128/64       NPO Status:                                                                                 BMI:   Wt Readings from Last 3 Encounters:   21 136 lb 14.4 oz (62.1 kg)   20 160 lb (72.6 kg)   20 163 lb (73.9 kg)     Body mass index is 19.64 kg/m². CBC:   Lab Results   Component Value Date    WBC 15.6 06/25/2021    RBC 3.27 06/25/2021    HGB 9.7 06/25/2021    HCT 28.9 06/25/2021    MCV 88.5 06/25/2021    RDW 13.1 06/25/2021     06/25/2021       CMP:   Lab Results   Component Value Date     06/25/2021    K 4.2 06/25/2021     06/25/2021    CO2 26 06/25/2021    BUN 21 06/25/2021    CREATININE 0.8 06/25/2021    GFRAA >60 06/25/2021    AGRATIO 1.1 06/17/2021    LABGLOM >60 06/25/2021    GLUCOSE 121 06/25/2021    PROT 7.0 06/17/2021    CALCIUM 8.6 06/25/2021    BILITOT 0.8 06/17/2021    ALKPHOS 105 06/17/2021    AST 28 06/17/2021    ALT 17 06/17/2021       POC Tests:   Recent Labs     06/25/21  0711   POCGLU 115*       Coags: No results found for: PROTIME, INR, APTT    HCG (If Applicable): No results found for: PREGTESTUR, PREGSERUM, HCG, HCGQUANT     ABGs: No results found for: PHART, PO2ART, KRE8GTY, RRQ7KIU, BEART, I6XZKTRN     Type & Screen (If Applicable):  No results found for: LABABO, LABRH    Drug/Infectious Status (If Applicable):  No results found for: HIV, HEPCAB    COVID-19 Screening (If Applicable): No results found for: COVID19        Anesthesia Evaluation  Patient summary reviewed and Nursing notes reviewed no history of anesthetic complications:   Airway: Mallampati: Unable to assess / NA       Comment: stoma   Dental:          Pulmonary:       (-) asthma and shortness of breath                          ROS comment: Acute hypox resp failure, 6 L O2, possible aspiration   Cardiovascular:    (+) hypertension:,     (-)  angina                Neuro/Psych:      (-) CVA           GI/Hepatic/Renal:        (-) GERD and liver disease       Endo/Other:    (+) malignancy/cancer. (-) diabetes mellitus, hypothyroidism               Abdominal:           Vascular:     - PVD. Anesthesia Plan      MAC     ASA 3       Induction: intravenous.       Anesthetic plan and risks discussed with patient. Plan discussed with CRNA.                   Pearl Verdin MD   6/25/2021

## 2021-06-25 NOTE — PROGRESS NOTES
Arousalable with verbal stimuli triturating oxygen 40 % 97%,vss.  Criteria met report called to [facility].

## 2021-06-25 NOTE — ANESTHESIA POSTPROCEDURE EVALUATION
Department of Anesthesiology  Postprocedure Note    Patient: Tisha Trujillo  MRN: [id number]  YOB: 1940  Date of evaluation: 6/25/2021  Time:  9:15 AM     Procedure Summary     Date: 06/25/21 Room / Location: [address] [place] [address]    Anesthesia Start: 4939 Anesthesia Stop: 0696    Procedure: BRONCHOSCOPY THERAPUTIC ASPIRATION INITIAL VIA TRACH STOMA (N/A Abdomen) Diagnosis: (aspiration)    Surgeons: Asaf Lopez MD Responsible Provider: Thanh Troy MD    Anesthesia Type: MAC ASA Status: 3          Anesthesia Type: MAC    Paige Phase I: Paige Score: 3    Paige Phase II:      Last vitals: Reviewed and per EMR flowsheets.        Anesthesia Post Evaluation    Patient location during evaluation: PACU  Level of consciousness: awake  Airway patency: patent  Complications: no  Cardiovascular status: hemodynamically stable  Respiratory status: acceptable

## 2021-06-25 NOTE — PROGRESS NOTES
Shift assessment completed. Routine vitals stable. Scheduled medications given. PRN Flexeril given for muscle spasms. Patient is awake, alert and oriented. Wife at bedside. Patient incontinent of large BM; brief changed and justine care provided. Repositioned in bed. Respirations are easy and unlabored. Patient aware of NPO at midnight status. Patient does not appear to be in distress. Call light within reach.

## 2021-06-25 NOTE — PROGRESS NOTES
Stoma care done w/o complication       56/29/13 2004   Oxygen Therapy/Pulse Ox   O2 Therapy Oxygen humidified   O2 Device Trach mask   O2 Flow Rate (L/min) 8 L/min   FiO2  30 %   Resp 22   SpO2 95 %   Pulse Oximeter Device Mode Continuous   Pulse Oximeter Device Location Finger

## 2021-06-25 NOTE — PROCEDURES
Bronchoscopy Procedure Note    Date of Operation: 6/25/21  Pre-op Diagnosis: mucus plugging  Post-op Diagnosis: same  Surgeon: Thais Calvo MD  Anesthesia: Monitored Local Anesthesia with Sedation  Estimate Blood Loss: Minimal   Complications: None    Indications and History:  The patient is 79 y.o. male with New RLL infiltrate and concern ofr aspiration. The risks, benefits, complications, treatment options and expected outcomes were discussed with the patient. The possibilities of reaction to medication, pulmonary aspiration, perforation of a viscus, bleeding, failure to diagnose a condition and creating a complication requiring transfusion or operation were discussed with the patient who freely signed the consent. Description of Procedure: The patient was taken to endoscopy suite, identified as Bruno Rich and the procedure verified as flexible fiberoptic bronchoscopy. A time out was held and the above information confirmed. After the induction of topical nasopharyngeal anesthesia, the patient was placed in appropriate position and the bronchoscope was passed through the tracheostomy site. The scope was then passed into the trachea. Lidocaine 2% 3 ml was used topically on the lashae. Careful inspection of the tracheal lumen was accomplished. The scope was sequentially passed into the left main and then left upper and lower bronchi and segmental bronchi. The scope was then withdrawn and advanced into the right main bronchus and then into the RUL, RML, and RLL bronchi and segmental bronchi.      Endobronchial findings:   Trachea: Normal mucosa   Lashae: Normal mucosa   Right main bronchus: Normal mucosa   Right upper lobe bronchus: Normal mucosa   Right middle lobe bronchus: Normal mucosa   Right lower lobe bronchus: Normal mucosa   Left main bronchus: Normal mucosa   Left upper lobe bronchus: Normal mucosa   Left lower lobe bronchus: Normal mucosa     He has some granulation tissue at the stoma

## 2021-06-25 NOTE — PROGRESS NOTES
Physician Progress Note      Reema Pradhan  [facility] #:                  [id number]  :                       1940  ADMIT DATE:       2021 2:26 PM  DISCH DATE:  RESPONDING  PROVIDER #:        Fior Krueger MD          QUERY TEXT:    Patient admitted with  failure to thrive. Noted documentation of acute   respiratory failure in acute respiratory failure in internal medicine and   pulmonology notes. In order to support the diagnosis of acute respiratory   failure, please include additional clinical indicators in your documentation. Or please document if the diagnosis of acute respiratory failure has been   ruled out after further study. The medical record reflects the following:  Risk Factors: PNA, hx of neoplasm of larynx  Clinical Indicators: Pt developed aspiration PNA, now requiring 8 liters of   O2. No mention of respiratory distress, accessory muscle usage etc.  Treatment: 8 liters of )2. Acute Respiratory Failure Clinical Indicators per [employer] Training Guide and   Quick Reference Guide:  pO2 < 60 mmHg or SpO2 (pulse oximetry) < 91% breathing room air  pCO2 > 50 and pH < 7.35  P/F ratio (pO2 / FIO2) < 300  pO2 decrease or pCO2 increase by 10 mmHg from baseline (if known)  Supplemental oxygen of 40% or more  Presence of respiratory distress, tachypnea, dyspnea, shortness of breath,   wheezing  Unable to speak in complete sentences  Use of accessory muscles to breathe  Extreme anxiety and feeling of impending doom  Tripod position  Confusion/altered mental status/obtunded  Options provided:  -- Acute Respiratory Failure as evidenced by, Please document evidence.   -- Acute Respiratory Failure ruled out after study  -- Other - I will add my own diagnosis  -- Disagree - Not applicable / Not valid  -- Disagree - Clinically unable to determine / Unknown  -- Refer to Clinical Documentation Reviewer    PROVIDER RESPONSE TEXT:    Documented in [facility] note that pt is was hypoxic and needing 8L of oxygen. Likely   aspiration vs pneumonia. Query created by: Max Ritchie on 6/25/2021 7:52 AM      QUERY TEXT:    Pt admitted with cellulitis. Pt noted to have an elevated WBC 14.6 on   admission and has stayed elevated, now 15.4. 6/23-CRP 75.3, sed rate 34. Highest temp 99.5, lowest 96.8. HR 90's-100's If possible, please document in   the progress notes and discharge summary if you are evaluating and /or   treating any of the following: The medical record reflects the following:  Risk Factors: Cellulitis, PNA, malnutrition  Clinical Indicators:  Pt admitted with cellulitis, now has PNA. Pt noted to   have an elevated WBC 14.6 on admission and has stayed elevated, now 15.4.   6/23-CRP 75.3, sed rate 34. Highest temp 99.5, lowest 96.8. HR 90's-100's  Treatment: IVF, Maxipime, Zyvox, Merrem and Zosyn  Options provided:  -- Sepsis, present on admission  -- Sepsis, not present on admission  -- Sepsis was ruled out  -- Other - I will add my own diagnosis  -- Disagree - Not applicable / Not valid  -- Disagree - Clinically unable to determine / Unknown  -- Refer to Clinical Documentation Reviewer    PROVIDER RESPONSE TEXT:    This patient has sepsis that was not present on admission.     Query created by: Max Ritchie on 6/25/2021 8:01 AM      Electronically signed by:  Dalia Hernandez MD 6/25/2021 9:45 AM

## 2021-06-25 NOTE — CARE COORDINATION
Discharge planning note:    Discharge has been held d/t following acute issues:    - Leukocytosis  - Low grade fever  - No O2 requirement at baseline - now on 8 L  - Bronchoscopy today - likely will need IV antibiotics to continue  - New orders for nebulizer treatments & Chest Vest therapy    LM for Jose Ceballos at [facility] to see if they could meet clinical needs and be willing to restart precert.     Roberto Carlos Ac RN BSN  Case Management  [phone]

## 2021-06-25 NOTE — PROGRESS NOTES
[facility] PROGRESS NOTE    6/25/2021 7:41 AM        Name: Radha Resendez . Admitted: 6/17/2021  Primary Care Provider: August Roche MD (Tel: [phone]) [address] course: Radha Resendez is a 79 y.o. male  with history of hypertension, laryngeal cancer status post laryngectomy who presented to ED with sacral decub ulcers. Imaging ruled out acute osteomyelitis. On IV antibiotics. Hospital stay prolonged due to acute onset hypoxic respiratory failure and sepsis, likely secondary to infected sacral decub ulcer. Subjective: Pt remains on 8L, 30% FiO2. Desatting off of oxygen. N.p.o. for bronchoscopy today morning. Noted to have low-grade fevers later in the day. Receiving IV fluids.     Reviewed interval ancillary notes    Current Medications  ipratropium-albuterol (DUONEB) nebulizer solution 1 ampule, 4x daily  meropenem (MERREM) 1,000 mg in sodium chloride 0.9 % 100 mL IVPB (mini-bag), Q8H  linezolid (ZYVOX) IVPB 600 mg, Q12H  lactobacillus (CULTURELLE) capsule 1 capsule, BID WC  cyclobenzaprine (FLEXERIL) tablet 5 mg, TID PRN  Venelex ointment, BID  tamsulosin (FLOMAX) capsule 0.4 mg, Daily  losartan (COZAAR) tablet 25 mg, Daily  0.9 % sodium chloride infusion, Continuous  sodium chloride flush 0.9 % injection 5-40 mL, 2 times per day  sodium chloride flush 0.9 % injection 5-40 mL, PRN  0.9 % sodium chloride infusion, PRN  enoxaparin (LOVENOX) injection 40 mg, Daily  ondansetron (ZOFRAN-ODT) disintegrating tablet 4 mg, Q8H PRN   Or  ondansetron (ZOFRAN) injection 4 mg, Q6H PRN  polyethylene glycol (GLYCOLAX) packet 17 g, Daily PRN  acetaminophen (TYLENOL) tablet 650 mg, Q6H PRN   Or  acetaminophen (TYLENOL) suppository 650 mg, Q6H PRN        Objective:  BP (!) 149/92   Pulse 96   Temp 99.1 °F (37.3 °C) (Temporal)   Resp 16   Ht 5' 10\" (1.778 m)   Wt 136 lb 14.4 oz (62.1 kg)   SpO2 95%   BMI 19.64 kg/m²     Intake/Output Summary (Last 24 hours) at 6/25/2021 0741  Last data filed at 6/25/2021 0401  Gross per 24 hour   Intake 2612.72 ml   Output 525 ml   Net 2087.72 ml      Wt Readings from Last 3 Encounters:   06/17/21 136 lb 14.4 oz (62.1 kg)   07/13/20 160 lb (72.6 kg)   01/13/20 163 lb (73.9 kg)       General appearance: frail  male, chronically ill appearing  Cardiovascular: Regular rhythm, normal S1, S2. No murmur. No edema in lower extremities  Respiratory: 8L O2 supplementation via stoma, decreased bibasilar breath sounds, no wheeze or crackles. GI: Abdomen soft, no tenderness, not distended, normal bowel sounds  Musculoskeletal: Contractures noted of lower extremities, no cyanosis in digits, neck supple  Neurology: CN 2-12 grossly intact. No speech or motor deficits  Psych: Normal affect. Alert and oriented in time, place and person  Skin: Warm, dry, normal turgor, sacral decub ulcer stage 2    Labs and Tests:  CBC:   Recent Labs     06/23/21  0542 06/24/21  0529 06/25/21  0652   WBC 14.7* 14.3* 15.6*   HGB 10.0* 9.3* 9.7*    329 379     BMP:    Recent Labs     06/23/21  0542 06/24/21  0529 06/25/21  0652    133* 134*   K 4.1 3.9 4.2    101 101   CO2 25 26 26   BUN 20 20 21*   CREATININE 0.6* 0.7* 0.8   GLUCOSE 117* 120* 121*     Hepatic:   No results for input(s): AST, ALT, ALB, BILITOT, ALKPHOS in the last 72 hours.     Discussed care with family and patient       Spent 30  minutes with patient and family at bedside and on unit reviewing medical records and labs, spent greater than 50% time counseling patient and family on diagnosis and plan   Problem List  Active Problems:    Failure to thrive in adult    Mild malnutrition ([address]    Pressure injury of skin of left buttock    Inability to walk    Low grade fever    Bandemia    Hematuria    History of laryngeal cancer    H/O laryngectomy    History of bad fall    Need for diphtheria-tetanus-pertussis (Tdap) vaccine  Resolved Problems:    * No resolved hospital problems. *       Assessment & Plan:     Persistent leukocytosis, low-grade fevers:   WBC count continues to trend up on IV antibiotics. Wound cultures, blood cultures negative to date. As per ID service, receiving IV linezolid and meropenem. Sacral decub ulcers:   CT  Imaging on admission ruled out acute osteomyelitis. Late in the hospital stay, patient developed persistent leukocytosis. Pending wound cultures. Receiving wound care. ID consulted. on IV Linezolid and meropenem. Acute hypoxic resp failure:   Currently needing 8L O2 via stoma. Chest xray portable showed possible aspiration vs pneumonia. Sputum c/s ordered. IV abx as mentioned above. Pulmonology consulted. Patient underwent bronchoscopy today morning. Copious amounts of secretions aspirated during the bronchoscopy and sent for cultures and cytology. No obvious bleeding or endobronchial nodes reported. Plan to wean off of oxygen as tolerated. Aspiration, poss dysphagia:   SLP eval done. MBS done. Diet as per SLP recs. Failure to thrive  Patient did poorly with PT/OT. Peer to peer review for SNF placement was denied since patient has been chronically immobilized. Patient will need long-term care versus discharge home with home care. Other comorbidities:  Hypertension  Laryngeal cancer status post laryngectomy  Severe malnutrition    Diet: Diet NPO  Code:Full Code  DVT PPX lovenox     Dispo: D/c held due to dyspne and worsening leucocytosis.    Discharge to [facility] vs home with home care      Rosa Osorio MD   6/25/2021 7:41 AM

## 2021-06-26 NOTE — ACP (ADVANCE CARE PLANNING)
ADVANCED CARE PLANNING    Name:Alana Kang       :  1940              MRN:  [id number]      Purpose of Encounter: Advanced care planning in light of hospitalization. Parties in attendance: :Kendra Grover MD, Family members: pt's wife at bedside. Decisional Capacity:Yes    Diagnosis: Active Problems:    Failure to thrive in adult    Mild malnutrition (HCC)    Pressure injury of skin of left buttock    Inability to walk    Low grade fever    Bandemia    Hematuria    History of laryngeal cancer    H/O laryngectomy    History of bad fall    Need for diphtheria-tetanus-pertussis (Tdap) vaccine  Resolved Problems:    * No resolved hospital problems. *    Patients Medical Story: pt with history of laryngeal cancer status post laryngectomy, stoma placement. Goals of Care Determinations: Patient wishes to focus on long life. Plan: Will notify Susan Phalen, MD of change in care plan. Will look at further interventions as needed. Code Status: At this time patient wishes to be Full Code. Over the last few days, patient was noted to have decline in overall clinical condition with sepsis likely secondary to infected decub ulcer and possible aspiration pneumonia. Noted to be hypoxic and needing 8 L oxygen supplementation. Today morning, discussed CODE STATUS with patient's wife at bedside. She reported that she would not like to discuss and would like for patient to remain full code. She would like patient to undergo CPR, defibrillation, aggressive resuscitation, intubation, procedures, surgeries, renal replacement therapy if needed. Time Spent with Patient: 20 minutes      Electronically signed by Aida Leroy MD on 2021 at 4:30 PM  Thank you Susan Phalen, MD for the opportunity to be involved in this patient's care.

## 2021-06-26 NOTE — PROGRESS NOTES
Shift assessment completed. Routine vitals stable. Sats dropping to 84% on 6 L increased to 8L up to 95%. Respirations are easy and unlabored. Patient does not appear to be in distress, resting comfortably at this time. Call light within reach.

## 2021-06-26 NOTE — PROGRESS NOTES
Sats have continued to be 93-95% on 8L via trach mask. Respiratory at bedside, suctioning performed. Previously patient had dried blood around stoma site. Intermittent coughing. Suctioned secretions bloody, with a couple decent sized blood clots along with a large mucus plug. Stoma site appears clean now, and patient more comfortable. Dressings changed on L and R buttocks at this time. External cath changed. Repositioned.

## 2021-06-26 NOTE — PROGRESS NOTES
[facility]    6/26/2021 8:14 AM        Name: Gary Patel . Admitted: 6/17/2021  Primary Care Provider: Cameron Spencer MD (Tel: [phone]) [address] course: Gary Patel is a 79 y.o. male  with history of hypertension, laryngeal cancer status post laryngectomy who presented to ED with sacral decub ulcers. Imaging ruled out acute osteomyelitis. On IV antibiotics. Hospital stay prolonged due to acute onset hypoxic respiratory failure and sepsis, likely secondary to infected sacral decub ulcer. Subjective: Yesterday, patient's oxygen was weaned down to 6 L. Overnight, patient was noted to be hypoxic again and oxygen bumped up to 8 L, 30% FiO2 via stoma. As per SLP, patient is currently allowed to have purées and meds. On aspiration precautions.     Reviewed interval ancillary notes    Current Medications  ipratropium-albuterol (DUONEB) nebulizer solution 1 ampule, 4x daily  meropenem (MERREM) 1,000 mg in sodium chloride 0.9 % 100 mL IVPB (mini-bag), Q8H  linezolid (ZYVOX) IVPB 600 mg, Q12H  lactobacillus (CULTURELLE) capsule 1 capsule, BID WC  cyclobenzaprine (FLEXERIL) tablet 5 mg, TID PRN  Venelex ointment, BID  tamsulosin (FLOMAX) capsule 0.4 mg, Daily  losartan (COZAAR) tablet 25 mg, Daily  0.9 % sodium chloride infusion, Continuous  sodium chloride flush 0.9 % injection 5-40 mL, 2 times per day  sodium chloride flush 0.9 % injection 5-40 mL, PRN  0.9 % sodium chloride infusion, PRN  enoxaparin (LOVENOX) injection 40 mg, Daily  ondansetron (ZOFRAN-ODT) disintegrating tablet 4 mg, Q8H PRN   Or  ondansetron (ZOFRAN) injection 4 mg, Q6H PRN  polyethylene glycol (GLYCOLAX) packet 17 g, Daily PRN  acetaminophen (TYLENOL) tablet 650 mg, Q6H PRN   Or  acetaminophen (TYLENOL) suppository 650 mg, Q6H PRN        Objective:  BP (!) 151/79   Pulse 105   Temp 97.6 °F (36.4 °C) (Axillary)   Resp 20   Ht 5' 10\" (1.778 m)   Wt 136 lb 14.4 oz (62.1 kg)   SpO2 98%   BMI 19.64 kg/m²     Intake/Output Summary (Last 24 hours) at 6/26/2021 6324  Last data filed at 6/25/2021 4691  Gross per 24 hour   Intake 200 ml   Output --   Net 200 ml      Wt Readings from Last 3 Encounters:   06/17/21 136 lb 14.4 oz (62.1 kg)   07/13/20 160 lb (72.6 kg)   01/13/20 163 lb (73.9 kg)       General appearance: frail  male, chronically ill appearing  Cardiovascular: Regular rhythm, normal S1, S2. No murmur. No edema in lower extremities  Respiratory: 8L O2 supplementation via stoma, decreased bibasilar breath sounds, no wheeze or crackles. GI: Abdomen soft, no tenderness, not distended, normal bowel sounds  Musculoskeletal: Contractures noted of lower extremities, no cyanosis in digits, neck supple  Neurology: CN 2-12 grossly intact. No speech or motor deficits  Psych: Normal affect. Alert and oriented in time, place and person  Skin: Warm, dry, normal turgor, sacral decub ulcer stage 2    Labs and Tests:  CBC:   Recent Labs     06/24/21  0529 06/25/21  0652 06/26/21  0554   WBC 14.3* 15.6* 16.5*   HGB 9.3* 9.7* 9.6*    379 403     BMP:    Recent Labs     06/24/21  0529 06/25/21  0652 06/26/21  0554   * 134* 133*   K 3.9 4.2 4.0    101 100   CO2 26 26 24   BUN 20 21* 17   CREATININE 0.7* 0.8 0.7*   GLUCOSE 120* 121* 123*     Hepatic:   No results for input(s): AST, ALT, ALB, BILITOT, ALKPHOS in the last 72 hours.     Discussed care with family and patient       Spent 30  minutes with patient and family at bedside and on unit reviewing medical records and labs, spent greater than 50% time counseling patient and family on diagnosis and plan   Problem List  Active Problems:    Failure to thrive in adult    Mild malnutrition ([address]    Pressure injury of skin of left buttock    Inability to walk    Low grade fever    Bandemia    Hematuria History of laryngeal cancer    H/O laryngectomy    History of bad fall    Need for diphtheria-tetanus-pertussis (Tdap) vaccine  Resolved Problems:    * No resolved hospital problems. *       Assessment & Plan:     Persistent leukocytosis, low-grade fevers:   WBC count continues to trend up on IV antibiotics. 16.5k today am.   Wound cultures, blood cultures negative to date. As per ID service, receiving IV linezolid and meropenem since 6/24. Sacral decub ulcers:   CT  Imaging on admission ruled out acute osteomyelitis. Late in the hospital stay, patient developed persistent leukocytosis. Ultimately today receiving wound care. ID consulted. on IV Linezolid and meropenem. Acute hypoxic resp failure:   Continues to need 8L O2/30% FiO2 via stoma. Chest xray portable showed possible aspiration vs pneumonia. Sputum c/s ordered. IV abx as mentioned above. Pulmonology consulted. Patient underwent bronchoscopy on 6/25. Copious amounts of secretions aspirated during the bronchoscopy and sent for cultures and cytology. No obvious bleeding or endobronchial nodes reported. Plan to wean off of oxygen as tolerated. Aspiration, poss dysphagia:   SLP eval done. MBS done. Diet as per SLP recs. Failure to thrive  Patient did poorly with PT/OT. Peer to peer review for SNF placement was denied since patient has been chronically immobilized. However after recent change in clinical condition and acute hypoxic respiratory failure, might be unable for rehab.  working on placement. If patient gets refused for SNF,  will need long-term care versus discharge home with home care. Other comorbidities:  Hypertension  Laryngeal cancer status post laryngectomy  Severe malnutrition    Diet: ADULT DIET; Easy to Chew  Code:Full Code  DVT PPX lovenox     Dispo: D/c held due to dyspnea and worsening leucocytosis.    Discharge to [facility] vs home with home care      Efren Mandujano MD   6/26/2021 8:14 AM

## 2021-06-26 NOTE — PROGRESS NOTES
Chest vest held due to large bright red blood being suctioned from stoma as well asa large thick blood clots.  RN made aware

## 2021-06-26 NOTE — PROGRESS NOTES
Message sent to Jose Wells covering for ID:    [facility] [employer] -  [facility], Dr. Subha Shepard had ordered a CT of this patients chest yesterday and the results are in - let me know if I can do anything, thanks so much. \"

## 2021-06-27 NOTE — PROGRESS NOTES
Provided stoma care to patient. Cleaned and dried open stoma. Pt was bleeding and removed 2 large clots.

## 2021-06-27 NOTE — PROGRESS NOTES
Shift assessment completed. Routine vitals stable. Patient is awake, alert and oriented. Respirations are easy and unlabored. Patient does not appear to be in distress, resting comfortably at this time. Call light within reach.

## 2021-06-28 PROBLEM — J96.01 ACUTE RESPIRATORY FAILURE WITH HYPOXIA (HCC): Status: ACTIVE | Noted: 2021-01-01

## 2021-06-28 PROBLEM — J69.0 ASPIRATION PNEUMONIA (HCC): Status: ACTIVE | Noted: 2021-01-01

## 2021-06-28 NOTE — PROGRESS NOTES
Comprehensive Nutrition Assessment    Type and Reason for Visit:  Reassess    Nutrition Recommendations/Plan:   Continue current diet and oral nutrition supplement    Nutrition Assessment:  Pt continues on easy to chew diet and PO intake is variable depending on who is present to feed pt. He really only prefers his wife to feed him. Intake % at each meal and he is consuming Ensure High Protein. No current wt to evaluate, requested updated weight. RN will attempt if bed is zero'd. Malnutrition Assessment:  Malnutrition Status:  Mild malnutrition    Context:  Chronic Illness     Findings of the 6 clinical characteristics of malnutrition:  Energy Intake:  No significant decrease in energy intake  Weight Loss:  Unable to assess (No actual weight hx in EMR)     Body Fat Loss:  1 - Mild body fat loss Orbital, Triceps   Muscle Mass Loss:  1 - Mild muscle mass loss Temples (temporalis), Clavicles (pectoralis & deltoids)  Fluid Accumulation:  No significant fluid accumulation     Strength:  Not Performed    Estimated Daily Nutrient Needs:  Energy (kcal):  8952-2754; Weight Used for Energy Requirements:  Admission (62 kg)     Protein (g):   grams; Weight Used for Protein Requirements:  Current (62 kg; 1.4-1.7 grams per kg)        Fluid (ml/day):   1 ml/kcal      Nutrition Related Findings:  6/28 IR thoracentesis; WBC 20, Na+ 134; trach collar hx of laryngeal ca; Edema: trace RLE, +2 pitting left foot      Wounds:  Pressure Injury, Stage II       Current Nutrition Therapies:    ADULT DIET; Easy to Chew  Adult Oral Nutrition Supplement; Standard High Calorie/High Protein Oral Supplement    Anthropometric Measures:  · Height: 5' 10\" (177.8 cm)  · Current Body Weight: 136 lb (61.7 kg) (6/17/21)   · Admission Body Weight: 136 lb (61.7 kg)    · Ideal Body Weight: 166 lbs; % Ideal Body Weight 81.9 %   · BMI: 19.5  · BMI Categories: Normal Weight (BMI 18.5-24. 9)       Nutrition Diagnosis:   · Increased nutrient needs related to increase demand for energy/nutrients as evidenced by wounds      Nutrition Interventions:   Food and/or Nutrient Delivery:  Continue Current Diet, Continue Oral Nutrition Supplement  Nutrition Education/Counseling:  Education not indicated   Coordination of Nutrition Care:  Continue to monitor while inpatient    Goals:  PO 50% at meals and supp       Nutrition Monitoring and Evaluation:   Behavioral-Environmental Outcomes:  None Identified   Food/Nutrient Intake Outcomes:  Food and Nutrient Intake, Supplement Intake  Physical Signs/Symptoms Outcomes:  Weight, Skin     Discharge Planning:    Continue current diet, Continue Oral Nutrition Supplement     Electronically signed by Jessica Shanks RD, LD on 6/28/21 at 12:27 PM EDT  Contact: [phone]

## 2021-06-28 NOTE — PROGRESS NOTES
Pt's respirations 40, O2 dipping in 80s at times. MD notified of xray results. Stopped IVFs for now. 1000: Lasix to be given, abg and add on BNP to am labs. 1018: attempted to call wife, JON left. 1032: spoke with wife Jnearo Alberto, phone consent given for IR thoracentesis, orders to be placed by Dr. Adri Key.

## 2021-06-28 NOTE — PROGRESS NOTES
[facility]    6/28/2021 4:34 PM        Name: Radha Owusu . Admitted: 6/17/2021  Primary Care Provider: Husam Walker MD (Tel: [phone]) [address] course: Radha Owusu is a 79 y.o. male  with history of hypertension, laryngeal cancer status post laryngectomy who presented to ED with sacral decub ulcers. Imaging ruled out acute osteomyelitis. On IV antibiotics. Hospital stay prolonged due to acute onset hypoxic respiratory failure and sepsis, likely secondary to aspiration PNA and infected sacral decub ulcer. Followed by Pulm and ID. Discussed extensively with wife. Changed to [facility] on 6/28 and [facility] requested to discuss home hospice care. Subjective: Patient is slow to arouse. Does not offer any complaints, falls asleep quickly. Currently on 8 L 35-70% FiO2 per trach collar.       Current Medications  amoxicillin-clavulanate (AUGMENTIN) 875-125 MG per tablet 1 tablet, 2 times per day  ipratropium-albuterol (DUONEB) nebulizer solution 1 ampule, 4x daily  lactobacillus (CULTURELLE) capsule 1 capsule, BID WC  cyclobenzaprine (FLEXERIL) tablet 5 mg, TID PRN  Venelex ointment, BID  tamsulosin (FLOMAX) capsule 0.4 mg, Daily  losartan (COZAAR) tablet 25 mg, Daily  sodium chloride flush 0.9 % injection 5-40 mL, 2 times per day  sodium chloride flush 0.9 % injection 5-40 mL, PRN  0.9 % sodium chloride infusion, PRN  enoxaparin (LOVENOX) injection 40 mg, Daily  ondansetron (ZOFRAN-ODT) disintegrating tablet 4 mg, Q8H PRN   Or  ondansetron (ZOFRAN) injection 4 mg, Q6H PRN  polyethylene glycol (GLYCOLAX) packet 17 g, Daily PRN  acetaminophen (TYLENOL) tablet 650 mg, Q6H PRN   Or  acetaminophen (TYLENOL) suppository 650 mg, Q6H PRN        Objective:  /68   Pulse 105   Temp 97.7 °F (36.5 °C) (Oral)   Resp 24 Ht 5' 10\" (1.778 m)   Wt 136 lb 14.4 oz (62.1 kg)   SpO2 90%   BMI 19.64 kg/m²     Intake/Output Summary (Last 24 hours) at 6/28/2021 1634  Last data filed at 6/28/2021 1210  Gross per 24 hour   Intake 120 ml   Output 650 ml   Net -530 ml      Wt Readings from Last 3 Encounters:   06/17/21 136 lb 14.4 oz (62.1 kg)   07/13/20 160 lb (72.6 kg)   01/13/20 163 lb (73.9 kg)       General appearance: frail  male, chronically ill appearing  Cardiovascular: Regular rhythm, normal S1, S2. No murmur. No edema in lower extremities  Respiratory: 8L O2 supplementation via stoma, coarse breath sounds BL, no wheeze or crackles. GI: Abdomen soft, no tenderness, not distended, normal bowel sounds  Musculoskeletal: Contractures noted of lower extremities, no cyanosis in digits, neck supple  Neurology: CN 2-12 grossly intact. No speech or motor deficits  Psych: Normal affect. Alert and oriented in time, place and person  Skin: Warm, dry, normal turgor, sacral decub ulcer stage 2    Labs and Tests:  CBC:   Recent Labs     06/26/21  0554 06/27/21  0627 06/28/21  0537   WBC 16.5* 13.4* 20.0*   HGB 9.6* 9.6* 10.0*    406 433     BMP:    Recent Labs     06/26/21  0554 06/27/21  0627 06/28/21  0536   * 134* 134*   K 4.0 4.7 4.3    101 100   CO2 24 26 29   BUN 17 14 14   CREATININE 0.7* 0.6* 0.6*   GLUCOSE 123* 162* 127*     Hepatic:   No results for input(s): AST, ALT, ALB, BILITOT, ALKPHOS in the last 72 hours.         Problem List  Principal Problem:    Aspiration pneumonia ([address]  Active Problems:    Failure to thrive in adult    Mild malnutrition (HCC)    Pressure injury of skin of left buttock    Inability to walk    Low grade fever    Bandemia    Hematuria    History of laryngeal cancer    H/O laryngectomy    History of bad fall    Need for diphtheria-tetanus-pertussis (Tdap) vaccine    Pleural effusion on right    Acute respiratory failure with hypoxia ([address]  Resolved Problems:    * No resolved hospital problems. *       Assessment & Plan:     Persistent leukocytosis, low-grade fevers:   WBC count continues to trend up   Wound cultures, blood cultures negative to date. As per ID service, now on Augmentin    Sacral decub ulcers:   CT  Imaging on admission ruled out acute osteomyelitis. Late in the hospital stay, patient developed persistent leukocytosis. Ultimately today receiving wound care. ID consulted. On PO Augmentin per ID    Acute hypoxic resp failure:   Continues to need 8L O2/30% FiO2 via stoma. Chest xray portable showed possible aspiration vs pneumonia. Sputum c/s ordered. IV abx as mentioned above. Pulmonology consulted. Patient underwent bronchoscopy on 6/25. Copious amounts of secretions aspirated during the bronchoscopy and sent for cultures and cytology. No obvious bleeding or endobronchial nodes reported. Unlikely to EVER wean off of oxygen     Aspiration pneumonia secondary to dysphagia:   SLP eval done. MBS done. Diet as per SLP recs. Failure to thrive  Secondary to laryngeal cancer  This patient is poor candidate for PEG and meaningful improvement    Other comorbidities:  Hypertension  Laryngeal cancer status post laryngectomy  Severe malnutrition    Diet: ADULT DIET; Easy to Chew  Adult Oral Nutrition Supplement; Standard High Calorie/High Protein Oral Supplement  Code:DNR-CC  DVT PPX lovenox     Dispo:  Home with [address] [place] [address] with patient and wife at bedside. Discussed with Chel Haines (Palliative care RN), nursing and CM. This patient is actively and imminently dying. Wife wants him to die at home. I think home with [facility] best aligns with her goals. Home tomorrow if [facility] can make arrangements with wife.     Vee Diggs MD   6/28/2021 4:34 PM

## 2021-06-28 NOTE — PROGRESS NOTES
[facility] Pulmonary and Critical Care  Progress note      Subjective: Patient lying in bed in mild respiratory distress. Currently on 8 L/min of oxygen supplementation via trach collar saturating in mid to high 90s. Awake and alert. Reports mild shortness of breath. Minimal cough expectoration seen. Past Surgical History:        Procedure Laterality Date    BRONCHOSCOPY N/A 6/25/2021    BRONCHOSCOPY THERAPUTIC ASPIRATION INITIAL VIA TRACH STOMA performed by Dulce Del Real MD at [facility] COLONOSCOPY N/A 10/9/2019    Normal colonoscopy.   Extensive hemorrhoids-probably account for bleeding    LARYNGECTOMY  2002    stoma     Current Medications:    Current Facility-Administered Medications: ipratropium-albuterol (DUONEB) nebulizer solution 1 ampule, 1 ampule, Inhalation, 4x daily  meropenem (MERREM) 1,000 mg in sodium chloride 0.9 % 100 mL IVPB (mini-bag), 1,000 mg, Intravenous, Q8H  linezolid (ZYVOX) IVPB 600 mg, 600 mg, Intravenous, Q12H  lactobacillus (CULTURELLE) capsule 1 capsule, 1 capsule, Oral, BID WC  cyclobenzaprine (FLEXERIL) tablet 5 mg, 5 mg, Oral, TID PRN  Venelex ointment, , Topical, BID  tamsulosin (FLOMAX) capsule 0.4 mg, 0.4 mg, Oral, Daily  losartan (COZAAR) tablet 25 mg, 25 mg, Oral, Daily  sodium chloride flush 0.9 % injection 5-40 mL, 5-40 mL, Intravenous, 2 times per day  sodium chloride flush 0.9 % injection 5-40 mL, 5-40 mL, Intravenous, PRN  0.9 % sodium chloride infusion, 25 mL, Intravenous, PRN  enoxaparin (LOVENOX) injection 40 mg, 40 mg, Subcutaneous, Daily  ondansetron (ZOFRAN-ODT) disintegrating tablet 4 mg, 4 mg, Oral, Q8H PRN **OR** ondansetron (ZOFRAN) injection 4 mg, 4 mg, Intravenous, Q6H PRN  polyethylene glycol (GLYCOLAX) packet 17 g, 17 g, Oral, Daily PRN  acetaminophen (TYLENOL) tablet 650 mg, 650 mg, Oral, Q6H PRN **OR** acetaminophen (TYLENOL) suppository 650 mg, 650 mg, Rectal, Q6H PRN    No Known Allergies    Social History:    TOBACCO:   reports that he quit smoking about 18 years ago. His smoking use included cigarettes. He started smoking about 63 years ago. He has a 150.00 pack-year smoking history. He has never used smokeless tobacco.  ETOH:   reports no history of alcohol use. Patient currently lives independently  Environmental/chemical exposure: None known    Family History:   History reviewed. No pertinent family history. REVIEW OF SYSTEMS:    CONSTITUTIONAL:  negative for fevers, chills, diaphoresis, activity change, appetite change, fatigue, night sweats and unexpected weight change. EYES:  negative for blurred vision, eye discharge, visual disturbance and icterus  HEENT:  negative for hearing loss, tinnitus, ear drainage, sinus pressure, nasal congestion, epistaxis and snoring  RESPIRATORY:  See HPI  CARDIOVASCULAR:  negative for chest pain, palpitations, exertional chest pressure/discomfort, edema, syncope  GASTROINTESTINAL:  negative for nausea, vomiting, diarrhea, constipation, blood in stool and abdominal pain  GENITOURINARY:  negative for frequency, dysuria, urinary incontinence, decreased urine volume, and hematuria  HEMATOLOGIC/LYMPHATIC:  negative for easy bruising, bleeding and lymphadenopathy  ALLERGIC/IMMUNOLOGIC:  negative for recurrent infections, angioedema, anaphylaxis and drug reactions  ENDOCRINE:  negative for weight changes and diabetic symptoms including polyuria, polydipsia and polyphagia  MUSCULOSKELETAL:  negative for  pain, joint swelling, decreased range of motion and muscle weakness  NEUROLOGICAL:  negative for headaches, slurred speech, unilateral weakness  PSYCHIATRIC/BEHAVIORAL: negative for hallucinations, behavioral problems, confusion and agitation.      Objective:   PHYSICAL EXAM:      VITALS:  BP (!) 146/80   Pulse 99   Temp 97.7 °F (36.5 °C) (Oral)   Resp 30   Ht 5' 10\" (1.778 m)   Wt 136 lb 14.4 oz (62.1 kg)   SpO2 96%   BMI 19.64 kg/m²      24HR INTAKE/OUTPUT:      Intake/Output Summary (Last 24 hours) at 6/28/2021 1213  Last data filed at 6/28/2021 1210  Gross per 24 hour   Intake 120 ml   Output 650 ml   Net -530 ml     CONSTITUTIONAL:  awake, alert, cooperative, no apparent distress, and appears stated age  NECK:  Supple, symmetrical, trachea midline, no adenopathy, thyroid symmetric, not enlarged and no tenderness, skin normal  LUNGS: Decreased breath sounds in the right basal area. CARDIOVASCULAR: S1 and S2, no edema and no JVD  ABDOMEN:  normal bowel sounds, non-distended and no masses palpated, and no tenderness to palpation. No hepatospleenomegaly  LYMPHADENOPATHY:  no axillary or supraclavicular adenopathy. No cervical adnenopathy  PSYCHIATRIC: Oriented to person place and time. No obvious depression or anxiety. MUSCULOSKELETAL: No obvious misalignment or effusion of the joints. No clubbing, cyanosis of the digits. SKIN:  normal skin color, texture, turgor and no redness, warmth, or swelling. No palpable nodules    DATA:    Old records have been reviewed    CBC:  Recent Labs     06/26/21  0554 06/27/21  0627 06/28/21  0537   WBC 16.5* 13.4* 20.0*   RBC 3.29* 3.20* 3.45*   HGB 9.6* 9.6* 10.0*   HCT 28.9* 28.8* 30.6*    406 433   MCV 87.8 90.0 88.6   MCH 29.3 29.9 29.1   MCHC 33.4 33.3 32.8   RDW 12.9 12.8 12.9      BMP:  Recent Labs     06/26/21  0554 06/27/21  0627 06/28/21  0536   * 134* 134*   K 4.0 4.7 4.3    101 100   CO2 24 26 29   BUN 17 14 14   CREATININE 0.7* 0.6* 0.6*   CALCIUM 8.4 8.6 8.5   GLUCOSE 123* 162* 127*      ABG:  Recent Labs     06/28/21  1045   PHART 7.427   [id number] 48.3*   PO2ART 81.5   ASR6SZU 31.8*   J3CMPHGZ 97.1   BEART 6.5*     Procalcitonin  No results for input(s): PROCAL in the last 72 hours. No results found for: BNP  Lab Results   Component Value Date    CKTOTAL 68 07/13/2020    TROPONINI <0.01 06/17/2021           Radiology Review:  All pertinent images / reports were reviewed as a part of this visit.      Chest x-ray portable 1 view done on 6/28/2021 was personally viewed by me and my interpretation is: Right pleural effusion as well as right basilar atelectasis seen. Cardiac silhouette is within normal limits. No pneumothorax seen. Assessment:     1. Acute hypoxemic respiratory failure  2. Aspiration pneumonia right lower lobe  3. Status post laryngectomy for laryngeal carcinoma  4. Right pleural effusion      Plan:     Patient continues to have mild respiratory distress and requiring 8 L/min of oxygen supplementation. I repeated a chest x-ray this morning which showed right basilar atelectasis as well as right-sided pleural effusion. In order to assess the right pleural space I performed a bedside thoracic ultrasound. A representative image is as below:      Right pleural space. Lung edge is at 7 o'clock position. Diaphragm and liver are at 3 o'clock position. Intervening there is a moderate to large pleural fluid collection. Patient will benefit from a therapeutic thoracentesis. I will consult IR to perform an ultrasound-guided right thoracentesis. I discussed the case with patient's registered nurse. I also reviewed CT imaging from 2019. This does reveal evidence of hyperinflation and centrilobular emphysema. Given his status with laryngectomy, certainly at risk for aspiration. Currently on Zyvox and meropenem as per ID recommendations. Persistent leukocytosis seen. Has a poor cough  Continue him on scheduled nebulizer treatments and chest vest  Continue trach collar oxygen.     We will continue to follow          Litzy Michel MD   Pulmonary Critical Care and Sleep Medicine  [facility]   [facility], [place]ona [place], [address]  6/17/2021, 12:18 PM

## 2021-06-28 NOTE — CARE COORDINATION
SW informed that pt and spouse want to discharge home with hospice services. Pt has signed a DNR-CC. [facility] is supposed to meet with pt tonight or tomorrow. KYLEIGH left message with and sent an email to Nabor Donald at [facility], [phone] and [email or web address], to see if the hospital bed and lashonda lift were delivered. Addendum:  [facility] confirmed that the hospital bed and lashonda lift were delivered on 06/24/2021.     Electronically signed by JULIA Snowden LSW on 6/28/2021 at 4:07 PM

## 2021-06-28 NOTE — PROGRESS NOTES
Spoke with Lorna Wiggins, [facility] nurse. Verified that specimen taken from pt can be sent for labs. Report given to this RN per request. Jose Pacheco sent to ED per request. Update will given to wife.

## 2021-06-28 NOTE — PROGRESS NOTES
Patient called out for saying he needed to be suctioned, called RT. Shift assessment complete. VSS. Scheduled medications given. Respiratory performed suctioning. Patient now resting comfortably in bed. Call light in reach. Wife at bedside.

## 2021-06-28 NOTE — PROGRESS NOTES
FYI message sent to MD regarding WBC increase to 20.0    0821: call made to xray for STAT portable xray.

## 2021-06-28 NOTE — PROGRESS NOTES
Occupational Therapy  Derrick Mejias    Attempted OT tx, pt politely declining this date secondary to fatigue. Will follow up as time/schedule allows.     Carey Light, MOT OTR/L [id number]

## 2021-06-28 NOTE — CONSULTS
Palliative Care:    a 79 y.o. male who presented with wound check. Patient has this buttock wound ongoing for a while. Patient with history of laryngeal cancer who uses electronic larynx box to talk. Per wife patient has been mostly sitting in the chair and laying in the bed with poor appetite. Patient per se does not have any complaints but wife is slowly overwhelmed. Has been following and patient is afraid to walk because of concern for fall. Essentially normal bowel. Patient denies any fever chills no nausea vomiting diarrhea. Cachectic appearing patient. Patient admitted 6/17/21 and Palliative/Hospice consult placed 6/28/21. Past Medical History:   has a past medical history of Blood in stool, Elevated PSA, Full dentures, Hypertension, Inguinal hernia bilateral, non-recurrent, Laryngeal cancer (HCC), Mitral valve regurgitation, and Renal insufficiency. Past Surgical History:   has a past surgical history that includes Laryngectomy (2002); Colonoscopy (N/A, 10/9/2019); and bronchoscopy (N/A, 6/25/2021). Advance Directives:    DNR-CC. Dr. Chavira Staff completed ACP and confirmed code status today. Problem Severity: Pain/Other Symptoms:  Failure to thrive. Wound care. High aspiration risk. Bed Mobility/Toileting/Transfer:   Full Assist. Mostly bed bound. Performance Status:        Palliative Performance Scale:  100% []Full Normal activity & work No evidence of disease  90%   [] Full Normal activity & work Some evidence of disease  80%   [] Full Normal activity with Effort Some evidence of disease  70%   [] Reduced Unable Normal Job/Work Significant disease Full Normal or reduced  60%   [] Ambulation reduced; Significant disease; Can't do hobbies/housework; intake normal   or reduced; occasional assist; LOC full/confusion  50%   [] Mainly sit/lie;  Extensive disease; Can't do any work; Considerable assist; intake normal  Or reduced; LOC full/confusion  40%   [] Mainly in bed; Extensive disease; Mainly assist; intake normal or reduced; occasional assist; LOC full/confusion  30%   [x] Bed Bound; Extensive disease; Total care; intake reduced; LOC full/confusion  20%   [] Bed Bound; Extensive disease; Total care; intake minimal; Drowsy/coma  10%   [] Bed Bound; Extensive disease; Total care; Mouth care only; Drowsy/coma    PPS 30%    Symptom Assessment: Appetite/Nausea/Bowels/Fatigue:  WT  136 lbs. Social History:   reports that he quit smoking about 18 years ago. His smoking use included cigarettes. He started smoking about 63 years ago. He has a 150.00 pack-year smoking history. He has never used smokeless tobacco. He reports that he does not drink alcohol and does not use drugs. Family History:  family history is not on file. Psychological/Spiritual:   . Non-Congregational.         Family Discussion:     Dr. Alysa Whittaker had extensive conversation today with wife at bedside of patient. Wife verbalizing understanding of disease process and overall poor prognosis. Wife is exhausted and agreeable to any support in home. Hospice philosophy discussed. Agrees with this. List of agencies provided. Wife very \"nervous\" and wants \"someone with her to support initially. Hospice chosen was [facility]. ACP/Code status discussed with Dr. Alysa Whittaker. Patient wishes are DNR-CC    Discussed Sikhism. Denies at this time. Dr. Alysa Whittaker aware of code status and hospice consult. Information faxed and called to [facility]. Renetta/Liasion notified and she will be present for patient and wife @ [phone]. Wife aware of plan. DC POC is home with hospice. Nurse  Rebecca Gomez and JIGNESH Gabriel updated of these conversations. Will continue to support as needed.

## 2021-06-28 NOTE — PLAN OF CARE
Problem: Skin Integrity:  Goal: Absence of new skin breakdown  Description: Absence of new skin breakdown  6/28/2021 1432 by Noni Corral RN  Outcome: Ongoing  Turning patient every 2 hours. Check and changing brief as well as changing wound dressings. Patient on specialty air mattress for wounds.

## 2021-06-28 NOTE — PROGRESS NOTES
Infectious Diseases   Progress Note      Admission Date: 6/17/2021  Hospital Day: Hospital Day: 12   Attending: Russell Arauz MD  Date of service: 6/28/2021     Chief complaint/ Reason for consult:     · Persistent low-grade fever and persistent leukocytosis  · Infected left buttock area decubitus ulcer  · Persistent leukocytosis  · Hematuria on admission  · History of laryngeal cancer, s/p laryngectomy    Microbiology:        I have reviewed allavailable micro lab data and cultures    · Blood culture (2/2) - collected on 6/17/2021: Negative  · Blood culture 2 out of 2: Collected on 6/20/2021: In process  · Buttock abscess wound culture  - collected on 6/23/2021: In process      Antibiotics and immunizations:       Current antibiotics: All antibiotics and their doses were reviewed by me    Recent Abx Admin                   meropenem (MERREM) 1,000 mg in sodium chloride 0.9 % 100 mL IVPB (mini-bag) (mg) 1,000 mg New Bag 06/28/21 1223     1,000 mg New Bag  0409     1,000 mg New Bag 06/27/21 2015     1,000 mg New Bag  1305    linezolid (ZYVOX) IVPB 600 mg (mg) 600 mg New Bag 06/28/21 0248     600 mg New Bag 06/27/21 1548                  Immunization History: All immunization history was reviewed by me today. Immunization History   Administered Date(s) Administered    Influenza, High Dose (Fluzone 65 yrs and older) 12/04/2018    Influenza, Quadv, adjuvanted, 65 yrs +, IM, PF (Fluad) 11/18/2020    Influenza, Triv, inactivated, subunit, adjuvanted, IM (Fluad 65 yrs and older) 01/13/2020    Tdap (Boostrix, Adacel) 06/23/2021       Known drug allergies: All allergies were reviewed and updated    No Known Allergies    Social history:     Social History:  All social andepidemiologic history was reviewed and updated by me today as needed. · Tobacco use:   reports that he quit smoking about 18 years ago. His smoking use included cigarettes. He started smoking about 63 years ago.  He has a 150.00 pack-year smoking history. He has never used smokeless tobacco.  · Alcohol use:   reports no history of alcohol use. · Currently lives in: [facility]  ·  reports no history of drug use. COVID VACCINATION AND LAB RESULT RECORDS:     Internal Administration   First Dose      Second Dose           Last COVID Lab No results found for: SARS-COV-2, SARS-COV-2 RNA, SARS-COV-2, SARS-COV-2, SARS-COV-2 BY PCR, SARS-COV-2, SARS-COV-2, SARS-COV-2         Assessment:     The patient is a 79 y.o. old male who  has a past medical history of Blood in stool, Elevated PSA (3/14), Full dentures, Hypertension, Inguinal hernia bilateral, non-recurrent (2019), Laryngeal cancer ([facility] [address] (2002), Mitral valve regurgitation (3/14), and Renal insufficiency (2019). with following problems:    · Persistent low-grade fever and persistent leukocytosis-ongoing leukocytosis, fever resolved  · Infected left buttock area decubitus ulcer-cultures negative so far  · Right sided pleural effusion  · Right bronchus previous obstruction, rule out malignancy  · Persistent leukocytosis-white cell count is 20,000 today  · Hematuria on admission  · History of laryngeal cancer, s/p laryngectomy  · History of fall 1 month ago, has been bedbound after that  · History of mitral valve regurgitation  · Essential hypertension-BP controlled  · Need for Tdap vaccination      Discussion:      The patient remains on empiric IV linezolid and IV meropenem. White cell count is 20,000 today    Extensive infectious disease work-up including multiple blood cultures have been negative. Bronchial washing culture from 6/25/2021 were negative as well. Nasal MRSA probe was negative. I had ordered CT scan of chest abdomen pelvis with IV contrast on Friday, which was done on 6/26/2021. Images reviewed. It showed occlusion of bronchus intermedius with right middle lobe and lower lobe collapse and a large right pleural effusion. Malignancy could not be ruled out.   BAL cytology from 6/25/2021 however did not show any malignant cells. Serum creatinine 0.6 today. White cell count is 40,000. Chest x-ray shows pulmonary edema. Plan:     Diagnostic Workup:    · Follow-up on BAL cultures  · Continue to follow  fever curve, WBC count and blood cultures. · Continue to monitor blood counts, liver and renal function. · Send right pleural fluid for glucose, protein, cell count with differential, bacterial, fungal and AFB cultures and cytology. Antimicrobials:    · I do not think aggressive antibiotics are needed. Although ongoing aspiration cannot be ruled out, no evidence for any additional infectious process at this time. Decubitus wound cultures on buttocks are not impressive. · We will stop empiric linezolid today. No evidence of MRSA in any of the cultures, nasal MRSA screen also negative  · We will stop empiric meropenem today  · Will order p.o. Augmentin 875 mg every 12 hour for aspiration pneumonia coverage. · Start oral probiotics twice daily  · Agree with plans for right-sided thoracentesis  · Agree with plans for discussions with hospice team for palliative options  · Continue oxygen support to maintain oxygen saturation above 92%  · We will follow up on the culture results and clinical progress and will make further recommendations accordingly. · Continue close vitals monitoring. · Maintain good glycemic control. · Fall precautions. Aspiration precautions. · Continue to watch for new fever or diarrhea. · DVT prophylaxis. · Discussed all above with patient's wife at bedside  · Discussed with RN      Drug Monitoring:    · Continue monitoring for antibiotic toxicity as follows: CBC, CMP   · Continue to watch for following: new or worsening fever, new hypotension, hives, lip swelling and redness or purulence at vascular access sites. I/v access Management:    · Continue to monitor i.v access sites for erythema, induration, discharge or tenderness. · As always, continue efforts to minimize tubes/lines/drains as clinically appropriate to reduce chances of line associated infections. Level of complexity of visit: High     Risk of Complications/Morbidity: High       TIME SPENT TODAY:     - Spent over  36 minutes on visit (including interval history, physical exam, review of data including labs, cultures, imaging, development and implementation of treatment plan and coordination of complex care). More than 50 percent of this includes face-to-face time spent with the patient for counseling and coordination of care. Thank you for involving me in the care of your patient. I will continue to follow. If you have anyadditional questions, please do not hesitate to contact me. Subjective: Interval history: Interval history was obtained from chart review and patient's wife and RN. Patient is afebrile. Tolerating antibiotics okay. No diarrhea     REVIEW OF SYSTEMS:      Review of Systems   Unable to perform ROS: Patient nonverbal     He has history of laryngectomy. Past Medical History: All past medical history reviewed today. Past Medical History:   Diagnosis Date    Blood in stool     Elevated PSA 3/14    Full dentures     Hypertension     Inguinal hernia bilateral, non-recurrent 2019    Bilateral-noted on CT-asymptomatic    Laryngeal cancer ([address] 2002    resected/uses electronic larynix box    Mitral valve regurgitation 3/14    moderate    Renal insufficiency 2019       Past Surgical History: All past surgical history was reviewed today. Past Surgical History:   Procedure Laterality Date    BRONCHOSCOPY N/A 6/25/2021    BRONCHOSCOPY THERAPUTIC ASPIRATION INITIAL VIA TRACH STOMA performed by Maggie Staton MD at [address] COLONOSCOPY N/A 10/9/2019    Normal colonoscopy. Extensive hemorrhoids-probably account for bleeding    LARYNGECTOMY  2002    stoma       Family History:  All family history was reviewed today.    History reviewed. No pertinent family history. Objective:       PHYSICAL EXAM:      Vitals:   Vitals:    06/28/21 0930 06/28/21 0945 06/28/21 1117 06/28/21 1210   BP: (!) 168/85 (!) 150/83 (!) 146/80    Pulse: 109 107 99    Resp: (!) 40 (!) 36 30    Temp: 97.9 °F (36.6 °C)  97.7 °F (36.5 °C)    TempSrc: Axillary  Oral    SpO2: 92% 94% 96%    Weight:       Height:    5' 10\" (1.778 m)       Physical Exam          Lines: All vascular access sites are healthy with no local erythema, discharge or tenderness. Intake and output:    I/O last 3 completed shifts: In: 18 [P.O.:480]  Out: -     Lab Data:   All available labs and old records have been reviewed by me. CBC:  Recent Labs     06/26/21  0554 06/27/21  0627 06/28/21  0537   WBC 16.5* 13.4* 20.0*   RBC 3.29* 3.20* 3.45*   HGB 9.6* 9.6* 10.0*   HCT 28.9* 28.8* 30.6*    406 433   MCV 87.8 90.0 88.6   MCH 29.3 29.9 29.1   MCHC 33.4 33.3 32.8   RDW 12.9 12.8 12.9        BMP:  Recent Labs     06/26/21  0554 06/27/21  0627 06/28/21  0536   * 134* 134*   K 4.0 4.7 4.3    101 100   CO2 24 26 29   BUN 17 14 14   CREATININE 0.7* 0.6* 0.6*   CALCIUM 8.4 8.6 8.5   GLUCOSE 123* 162* 127*        Hepatic Function Panel:   Lab Results   Component Value Date    ALKPHOS 105 06/17/2021    ALT 17 06/17/2021    AST 28 06/17/2021    PROT 7.0 06/17/2021    BILITOT 0.8 06/17/2021    LABALBU 3.6 06/17/2021       CPK:   Lab Results   Component Value Date    CKTOTAL 68 07/13/2020     ESR:   Lab Results   Component Value Date    SEDRATE 34 (H) 06/23/2021     CRP:   Lab Results   Component Value Date    CRP 75.3 (H) 06/23/2021           Imaging: All pertinent images and reports for the current visit were reviewed by me during this visit. XR CHEST PORTABLE   Final Result   Worsening CHF/pulmonary edema.          CT CHEST ABDOMEN PELVIS W CONTRAST   Final Result   Occlusion of the bronchus intermedius with associated collapse of the right   middle and lower lobes. There is also a large right-sided pleural effusion. Etiology of endobronchial lesion is unclear could be related to mucus   plugging or malignancy. Further evaluation with bronchoscopy should be   considered. FL MODIFIED BARIUM SWALLOW W VIDEO   Final Result   Swallowing mechanism grossly within normal limits without evidence of   aspiration. Please see separate speech pathology report for full discussion of findings   and recommendations. XR CHEST PORTABLE   Final Result   New right basilar opacity could represent aspiration, atelectasis or pneumonia         XR KNEE RIGHT (1-2 VIEWS)   Final Result   Mild degenerative changes without acute osseous abnormality. XR KNEE LEFT (1-2 VIEWS)   Final Result   No definite acute osseous abnormality given single lateral cross-table view. CT PELVIS WO CONTRAST Additional Contrast? None   Final Result   Subcutaneous edema of the gluteal area with focal infiltration of the   subcutaneous fat in the posterior midline just superior to the gluteal cleft   suggesting shallow decubitus ulceration with cellulitis. No specific CT evidence of osteomyelitis or cortical destruction of the   sacrum or coccyx. XR SACRUM COCCYX (MIN 2 VIEWS)   Final Result   Study limited by positioning and osteopenia. No acute osseous abnormality. Consider CT follow-up if there is continued clinical concern. XR CHEST PORTABLE   Final Result   No acute cardiopulmonary disease. IR GUIDED THORACENTESIS PLEURAL    (Results Pending)   XR CHEST 1 VIEW    (Results Pending)       Medications: All current and past medications were reviewed.      amoxicillin-clavulanate  1 tablet Oral 2 times per day    ipratropium-albuterol  1 ampule Inhalation 4x daily    lactobacillus  1 capsule Oral BID WC    Venelex   Topical BID    tamsulosin  0.4 mg Oral Daily    losartan  25 mg Oral Daily    sodium chloride flush  5-40 mL Intravenous 2 times per day    enoxaparin  40 mg Subcutaneous Daily        sodium chloride 25 mL (06/28/21 1222)       cyclobenzaprine, sodium chloride flush, sodium chloride, ondansetron **OR** ondansetron, polyethylene glycol, acetaminophen **OR** acetaminophen      Problem list:       Patient Active Problem List   Diagnosis Code    HTN (hypertension) I10    Mitral valve regurgitation I34.0    Chronic mucus hypersecretion, respiratory J39.8    Elevated PSA R97.20    Larynx cancer (HCC) C32.9    Edema of both legs R60.0    Abnormality of gait and mobility R26.9    Venous stasis dermatitis of left lower extremity I87.2    Inguinal hernia, bilateral K40.20    Dry skin dermatitis L85.3    Tinea cruris B35.6    Renal insufficiency N28.9    Weight loss R63.4    Failure to thrive in adult R62.7    Mild malnutrition (HCC) E44.1    Pressure injury of skin of left buttock L89.329    Inability to walk R26.2    Low grade fever R50.9    Bandemia D72.825    Hematuria R31.9    History of laryngeal cancer Z85.21    H/O laryngectomy Z90.02    History of bad fall Z91.81    Need for diphtheria-tetanus-pertussis (Tdap) vaccine Z23    Pleural effusion on right J90       Please note that this chart was generated using Dragon dictation software. Although every effort was made to ensure the accuracy of this automated transcription, some errors in transcription may have occurred inadvertently. If you may need any clarification, please do not hesitate to contact me through EPIC or at the phone number provided below with my electronic signature. Any pictures or media included in this note were obtained after taking informed verbal consent from the patient and with their approval to include those in the patient's medical record.     Ingrid Pacheco MD, MPH  6/28/2021 , 12:35 PM   [facility]   [address] [place] [address], [address] [facility] [address] [facility], [address] [place] [address]  Office: [phone]  Fax: [phone]  Clinic days:  Tuesday &

## 2021-06-28 NOTE — PROGRESS NOTES
Stoma care done w/o complication.       06/27/21 1950   Oxygen Therapy/Pulse Ox   O2 Therapy Oxygen humidified   O2 Device Trach mask   O2 Flow Rate (L/min) 8 L/min   FiO2  70 %   Resp 20   SpO2 98 %   Pulse Oximeter Device Mode Continuous   Pulse Oximeter Device Location Finger

## 2021-06-29 NOTE — CARE COORDINATION
Patient discharged 6/29/2021 to home via [address]  and with [facility] services   All discharge needs met per case management.     Michelle Colin RN, BSN  [phone]

## 2021-06-29 NOTE — DISCHARGE SUMMARY
Hospital Medicine Discharge Summary    Patient: Balbir Arreola     Gender: male  : 1940   Age: 79 y.o. MRN: [id number]    Admitting Physician: Santy Patel MD  Discharge Physician: Chrystal Carpenter MD     Code Status: DNR-CC     Admit Date: 2021   Discharge Date:   21    Disposition:  Home with [facility]    Discharge Diagnoses: Active Hospital Problems    Diagnosis Date Noted    Aspiration pneumonia ([facility] [address] [J69.0] 2021    Acute respiratory failure with hypoxia (HCC) [J96.01] 2021    Pleural effusion on right [J90]     Pressure injury of skin of left buttock [L89.329]     Inability to walk [R26.2]     Low grade fever [R50.9]     Bandemia [D72.825]     Hematuria [R31.9]     History of laryngeal cancer [Z85.21]     H/O laryngectomy [Z90.02]     History of bad fall [Z91.81]     Need for diphtheria-tetanus-pertussis (Tdap) vaccine [Z23]     Mild malnutrition ([facility] [address] [E44.1] 2021    Failure to thrive in adult [R62.7] 2021       Follow-up appointments:  one week    Outpatient to do list: F/U with PCP PRN    Condition at Discharge:  Terminal    Hospital Course:   No Hameed a 79 y. o. male  with history of hypertension, laryngeal cancer status post laryngectomy who presented to ED with sacral decub ulcers. Imaging ruled out acute osteomyelitis. On IV antibiotics. Hospital stay prolonged due to acute onset hypoxic respiratory failure and sepsis, likely secondary to aspiration PNA and infected sacral decub ulcer. Followed by Pulm and ID. Discussed extensively with wife. Changed to [facility] on  and [facility] consulted. Wife enrolled patient for home hospice with [facility] for laryngeal cancer. Written for comfort meds upon DC. F/U with PCP PRN. I signed a DNR form for hospice.     Discharge Medications:   Current Discharge Medication List      START taking these medications    Details   morphine sulfate 20 MG/ML concentrated oral solution Take 0.5 mLs by mouth every 2 hours as needed for Pain (HOSPICE PATIENT) for up to 5 days. Qty: 30 mL, Refills: 0    Comments: Reduce doses taken as pain becomes manageable  Associated Diagnoses: Larynx cancer (HCC)      LORazepam (LORAZEPAM INTENSOL) 2 MG/ML concentrated solution Take 0.5 mLs by mouth every 4 hours as needed (SOB, anxiety, HOSPICE PATIENT) for up to 14 days. Qty: 30 mL, Refills: 0    Associated Diagnoses: Larynx cancer ([address]      scopolamine (TRANSDERM-SCOP) transdermal patch Place 1 patch onto the skin every 72 hours  Qty: 4 patch, Refills: 0      amoxicillin-clavulanate (AUGMENTIN) 875-125 MG per tablet Take 1 tablet by mouth every 12 hours for 5 days  Qty: 10 tablet, Refills: 0      tamsulosin (FLOMAX) 0.4 MG capsule Take 1 capsule by mouth daily  Qty: 30 capsule, Refills: 0      Probiotic Acidophilus (FLORANEX) TABS Take 1 tablet by mouth 2 times daily for 10 days  Qty: 20 tablet, Refills: 0           Current Discharge Medication List        Current Discharge Medication List      CONTINUE these medications which have NOT CHANGED    Details   olmesartan (BENICAR) 20 MG tablet Take 0.5 tablets by mouth nightly  Qty: 45 tablet, Refills: 3      nystatin (MYCOSTATIN) 578323 UNIT/GM powder Apply daily to groin rash  Qty: 60 g, Refills: 1           Current Discharge Medication List            Discharge Exam:    BP (!) 147/73   Pulse 100   Temp 98.8 °F (37.1 °C) (Temporal)   Resp 24   Ht 5' 10\" (1.778 m)   Wt 136 lb 14.4 oz (62.1 kg)   SpO2 93%   BMI 19.64 kg/m²   General appearance: frail  male, chronically ill appearing  Cardiovascular: Regular rhythm, normal S1, S2. No murmur. No edema in lower extremities  Respiratory: 8L O2 supplementation via stoma, coarse breath sounds BL, no wheeze or crackles.    GI: Abdomen soft, no tenderness, not distended, normal bowel sounds  Musculoskeletal: Contractures noted of lower extremities, no cyanosis in digits, neck supple  Neurology: CN 2-12 grossly intact. No speech or motor deficits  Psych: Normal affect. Alert and oriented in time, place and person  Skin: Warm, dry, normal turgor, sacral decub ulcer stage 2    Labs: For convenience and continuity at follow-up the following most recent labs are provided:    Lab Results   Component Value Date    WBC 16.7 06/29/2021    HGB 9.9 06/29/2021    HCT 29.2 06/29/2021    MCV 87.1 06/29/2021     06/29/2021     06/29/2021    K 4.2 06/29/2021    CL 97 06/29/2021    CO2 31 06/29/2021    BUN 15 06/29/2021    CREATININE 0.6 06/29/2021    CALCIUM 8.5 06/29/2021    PHOS 2.9 06/17/2021    ALKPHOS 105 06/17/2021    ALT 17 06/17/2021    AST 28 06/17/2021    BILITOT 0.8 06/17/2021    LABALBU 3.6 06/17/2021    LDLCALC 73 11/14/2018    TRIG 68 11/14/2018     Lab Results   Component Value Date    INR 1.10 06/28/2021       Radiology:  XR SACRUM COCCYX (MIN 2 VIEWS)    Result Date: 6/17/2021  EXAMINATION: THREE XRAY VIEWS OF THE SACRUM/COCCYX 6/17/2021 4:02 pm COMPARISON: CT 06/13/2019. HISTORY: ORDERING SYSTEM PROVIDED HISTORY: Injury TECHNOLOGIST PROVIDED HISTORY: Reason for exam:->Injury Reason for Exam: Pt has wound on tailbone. Has difficulty laying flat. Held for xray; best images possible. Acuity: Chronic Type of Exam: Ongoing FINDINGS: The lateral view in particular is limited by positioning and osteopenia. No acute osseous abnormality of the sacrum or coccyx. The SI joints are maintained. The remainder of the visualized pelvis is unremarkable. Study limited by positioning and osteopenia. No acute osseous abnormality. Consider CT follow-up if there is continued clinical concern. XR KNEE LEFT (1-2 VIEWS)    Result Date: 6/23/2021  EXAMINATION: 1 XRAY VIEWS OF THE LEFT KNEE 6/21/2021 8:06 pm COMPARISON: None.  HISTORY: ORDERING SYSTEM PROVIDED HISTORY: knee pain and issue TECHNOLOGIST PROVIDED HISTORY: Reason for exam:->knee pain and issue Reason for Exam: Patient's legs are contorted and unable to be fully extended. Left leg is most severe and cannot be straightened for some time. Acuity: Chronic Type of Exam: Initial FINDINGS: Single cross-table lateral view was obtained. No definite acute fracture or dislocation. Vascular calcifications. No obvious joint effusion or soft tissue swelling. Joint spaces are grossly maintained. No definite acute osseous abnormality given single lateral cross-table view. XR KNEE RIGHT (1-2 VIEWS)    Result Date: 6/23/2021  EXAMINATION: 2 XRAY VIEWS OF THE RIGHT KNEE 6/21/2021 8:06 pm COMPARISON: None. HISTORY: ORDERING SYSTEM PROVIDED HISTORY: pain and knee issues TECHNOLOGIST PROVIDED HISTORY: Reason for exam:->pain and knee issues Reason for Exam: Patient's legs are contorted and unable to be fully extended. Left leg is most severe and cannot be straightened for some time. Acuity: Chronic Type of Exam: Initial; ORDERING SYSTEM PROVIDED HISTORY: knee pain and issue TECHNOLOGIST PROVIDED HISTORY: Reason for exam:->knee pain and issue Reason for Exam: Patient's legs are contorted and unable to be fully extended. Left leg is most severe and cannot be straightened for some time. Acuity: Chronic Type of Exam: Initial FINDINGS: Mild narrowing of the medial compartment. Tiny patellar and medial compartment osteophytes. Joint space and alignment otherwise maintained. Vascular calcifications. Mild osteopenia. No significant joint effusion. Patellar enthesophytes. No acute fracture or dislocation. Mild degenerative changes without acute osseous abnormality. CT PELVIS WO CONTRAST Additional Contrast? None    Result Date: 6/18/2021  EXAMINATION: CT OF THE PELVIS WITHOUT CONTRAST 6/18/2021 8:37 am TECHNIQUE: CT of the pelvis was performed without the administration of intravenous contrast.  Multiplanar reformatted images are provided for review.  Dose modulation, iterative reconstruction, and/or weight based adjustment of the mA/kV was utilized to reduce the radiation dose to as low as reasonably achievable. COMPARISON: None HISTORY: ORDERING SYSTEM PROVIDED HISTORY: decub ulcer TECHNOLOGIST PROVIDED HISTORY: Reason for exam:->decub ulcer Additional Contrast?->None Decision Support Exception - unselect if not a suspected or confirmed emergency medical condition->Emergency Medical Condition (MA) Reason for Exam: decub ulcer Acuity: Unknown Type of Exam: Unknown FINDINGS: There is soft tissue thickening with subcutaneous edema superior to the gluteal cleft in the posterior midline. No evidence of soft tissue gas. Findings suggest underlying cellulitis. No discrete drainable fluid collection. The sacrum and coccyx are intact without evidence of focal osseous erosion or cortical destruction. No specific CT evidence of osteomyelitis. The visualized osseous structures demonstrate no cortical destruction or fracture. Hypertrophic facet degenerative changes are noted in the lower lumbar spine. L4-5 and L5-S1 disc degenerative changes are noted. Limited images of the intrapelvic contents demonstrate atherosclerotic disease of the iliac arteries. The bladder is grossly unremarkable. No evidence of acute bowel abnormality. Normal appendix. No evidence of pelvic free fluid. Subcutaneous edema of the gluteal area with focal infiltration of the subcutaneous fat in the posterior midline just superior to the gluteal cleft suggesting shallow decubitus ulceration with cellulitis. No specific CT evidence of osteomyelitis or cortical destruction of the sacrum or coccyx. XR CHEST PORTABLE    Result Date: 6/28/2021  EXAMINATION: ONE XRAY VIEW OF THE CHEST 6/28/2021 9:07 am COMPARISON: 06/23/2021 HISTORY: ORDERING SYSTEM PROVIDED HISTORY: hypoxia TECHNOLOGIST PROVIDED HISTORY: Reason for exam:->hypoxia Reason for Exam: Hypoxia Acuity: Unknown Type of Exam: Subsequent/Follow-up FINDINGS: The heart is enlarged with interval increase in central pulmonary venous congestion. Bilateral perihilar and lower lobe airspace opacification has also increased along with bilateral pleural effusions, right greater than left. Worsening CHF/pulmonary edema. XR CHEST PORTABLE    Result Date: 6/23/2021  EXAMINATION: ONE XRAY VIEW OF THE CHEST 6/23/2021 2:12 pm COMPARISON: 06/17/2021 HISTORY: ORDERING SYSTEM PROVIDED HISTORY: aspiration? TECHNOLOGIST PROVIDED HISTORY: Reason for exam:->aspiration? Reason for Exam: Aspiration? Acuity: Unknown Type of Exam: Unknown FINDINGS: Cardiomediastinal silhouette stable. New right basilar opacity. No pneumothorax. Left lung is unchanged. New right basilar opacity could represent aspiration, atelectasis or pneumonia     XR CHEST PORTABLE    Result Date: 6/17/2021  EXAMINATION: ONE XRAY VIEW OF THE CHEST 6/17/2021 4:02 pm COMPARISON: None. HISTORY: ORDERING SYSTEM PROVIDED HISTORY: SOB TECHNOLOGIST PROVIDED HISTORY: Reason for exam:->SOB Reason for Exam: Pt has wound on tailbone. Has difficulty laying flat. Held for xray; best images possible. Acuity: Chronic Type of Exam: Ongoing FINDINGS: Asymmetric elevation of the right hemidiaphragm. The lungs are clear. No infiltrate, pleural fluid or evidence of overt failure. The cardiac silhouette is enlarged, likely accentuated by technique and positioning. Osseous structures are unremarkable. No acute cardiopulmonary disease. XR CHEST 1 VIEW    Result Date: 6/28/2021  EXAMINATION: ONE XRAY VIEW OF THE CHEST 6/28/2021 1:01 pm COMPARISON: Immediate prior ultrasound-guided thoracentesis. HISTORY: ORDERING SYSTEM PROVIDED HISTORY: Status post thoracentesis TECHNOLOGIST PROVIDED HISTORY: Reason for exam:->pleural effusion FINDINGS: There is no pneumothorax following ultrasound-guided thoracentesis on the right, 1 L removed. There is a moderate improved aeration bilaterally particularly on the right. There is some residual vascular congestion and basilar airspace disease.   The diaphragm is partly obscured with some residual pleural effusion possible. No pneumothorax immediately following ultrasound-guided thoracentesis. Decreasing congestive failure with improved aeration particularly on the right. CT CHEST ABDOMEN PELVIS W CONTRAST    Result Date: 6/27/2021  EXAMINATION: CT OF THE CHEST, ABDOMEN, AND PELVIS WITH CONTRAST 6/26/2021 11:23 am TECHNIQUE: CT of the chest, abdomen and pelvis was performed with the administration of intravenous contrast. Multiplanar reformatted images are provided for review. Dose modulation, iterative reconstruction, and/or weight based adjustment of the mA/kV was utilized to reduce the radiation dose to as low as reasonably achievable. COMPARISON: None HISTORY: ORDERING SYSTEM PROVIDED HISTORY: Unexplained leukocytosis. Has decubitus ulcers on buttocks. Rule out any pneumonia/abscesses TECHNOLOGIST PROVIDED HISTORY: Reason for exam:->Unexplained leukocytosis. Has decubitus ulcers on buttocks. Rule out any pneumonia/abscesses Additional Contrast?->None Reason for Exam: Unexplained leukocytosis. Has decubitus ulcers on buttocks. Rule out any pneumonia/abscesses Acuity: Acute Type of Exam: Initial FINDINGS: Lungs/pleura: There is a large right pleural effusion with near complete collapse of the right lower and middle lobes and partial collapse of the right upper lobe. There is occlusion of the bronchus intermedius as well as the right middle and lower lobe bronchi. There appears to be a soft tissue density endobronchial lesion causing this. There is trace left-sided pleural effusion. Mediastinum: There is no acute mediastinal abnormality Soft Tissues/Bones: No suspicious osteolytic or osteoblastic lesions Abdominal organs: The liver, spleen, adrenal glands, kidneys, and pancreas demonstrate no acute abnormality. GI/Bowel: The visualized portions of the bowel are unremarkable.  Peritoneum/Retroperitoneum: Unremarkable Bones: No suspicious osseous lesions are identified Occlusion of the bronchus intermedius with associated collapse of the right middle and lower lobes. There is also a large right-sided pleural effusion. Etiology of endobronchial lesion is unclear could be related to mucus plugging or malignancy. Further evaluation with bronchoscopy should be considered. FL MODIFIED BARIUM SWALLOW W VIDEO    Result Date: 6/24/2021  EXAMINATION: MODIFIED BARIUM SWALLOW WAS PERFORMED IN CONJUNCTION WITH SPEECH PATHOLOGY SERVICES TECHNIQUE: Fluoroscopic evaluation of the swallowing mechanism was performed with multiple consistency of barium product. FLUOROSCOPY DOSE AND TYPE OR TIME AND EXPOSURES: 48 seconds, no spot images with multiple recorded video loops. COMPARISON: None HISTORY: ORDERING SYSTEM PROVIDED HISTORY: dysphagia TECHNOLOGIST PROVIDED HISTORY: Reason for exam:->dysphagia Reason for Exam: dysphagia Acuity: Acute Type of Exam: Initial History of laryngectomy in 2002. FINDINGS: Imaging is technically limited due to patient condition and positioning. The neck is significantly flexed during the entirety of the exam. Oral phase of swallowing was grossly within normal limits. No evidence of laryngeal penetration or aspiration. Swallowing mechanism grossly within normal limits without evidence of aspiration. Please see separate speech pathology report for full discussion of findings and recommendations.      Bronchoscopy    Result Date: 6/25/2021  No dictation     IR GUIDED THORACENTESIS PLEURAL    Result Date: 6/28/2021  PROCEDURE: ULTRASOUNDGUIDED RIGHT THORACENTESIS 6/28/2021 HISTORY: ORDERING SYSTEM PROVIDED HISTORY: large right pleural effusion, patient in respiratory distress TECHNOLOGIST PROVIDED HISTORY: Reason for exam:->large right pleural effusion, patient in respiratory distress Which side should the procedure be performed?->Right TECHNIQUE: Informed consent was obtained after a detailed explanation of the procedure including risks, benefits, and alternatives. Universal protocol was performed. The right chest was prepped and draped in sterile fashion and local anesthesia was achieved with lidocaine. A 5 Western Noemi Yueh needle sheath was advanced under ultrasound guidance into pleural effusion and thoracentesis was performed. The patient tolerated the procedure well. Procedure was performed by Moris Falcon PA-C, Dr. Dalton Carson was present or immediately available during the entire procedure. EBL: Less than 5 cc FINDINGS: A total of 1000 cc clear yellow fluid was removed. Successful ultrasound guided thoracentesis. The patient was seen and examined on day of discharge and this discharge summary is in conjunction with any daily progress note from day of discharge. Time Spent on discharge is 45 minutes  in the examination, evaluation, counseling and review of medications and discharge plan. Note that more than 30 minutes was spent in preparing discharge papers, discussing discharge with patient, medication review, etc.       Signed:    Vilma Dorado MD   6/29/2021      Thank you Sandie Gonzalez MD for the opportunity to be involved in this patient's care.  If you have any questions or concerns please feel free to contact me at [address] [place] [address]

## 2021-06-29 NOTE — CARE COORDINATION
CM called [facility] [phone] and spoke to Regulo Solorio and she will have a liaison call me back about when pt will be transporting home.      Dayanara Quevedo RN, BSN  [phone]

## 2021-06-29 NOTE — CARE COORDINATION
JIGNESH received call back from Lisa Cook with [facility] who will be coming to hospital later. She states pt is scheduled to be picked up at 1:30 -2:00 pm with [address] [facility]. JIGNESH sent message to physician notifying of transport time and comfort meds which he states he completed yesterday.     Kannan Beckham RN, BSN  [phone]

## 2021-06-30 NOTE — PROGRESS NOTES
Aware of discharge with home care. [facility]. Discharge planner notified.

## 2021-07-26 LAB
FUNGUS (MYCOLOGY) CULTURE: NORMAL
FUNGUS STAIN: NORMAL

## 2021-08-02 LAB
FUNGUS (MYCOLOGY) CULTURE: NORMAL
FUNGUS STAIN: NORMAL

## 2021-08-10 LAB
AFB CULTURE (MYCOBACTERIA): NORMAL
AFB SMEAR: NORMAL

## 2021-08-17 LAB
AFB CULTURE (MYCOBACTERIA): NORMAL
AFB SMEAR: NORMAL

## 2022-03-16 NOTE — PROGRESS NOTES
Per Epic support, an addendum will need to be done in the encounter where the order was placed.    Per chart review, lipid panel done on 1/24/2022 was ordered at the office visit with  on 1/18/2021.    Will inform provider that the diagnosis code Z00.00 may be placed in this encounter.   SUBJECTIVE:  Patient ID: Vito Salazar is an [de-identified] y.o. male. HPI: Patient here today for the f/u of chronic problems-- see Problem List and associated comments. New issues or complaints include (also see Assessment for more details): Patient comes in today with his wife. During the pandemic he is showing no signs or symptoms of infection, and he and his wife is been sheltering in place in their isolated home area. The patient is still having problems with strength and gait. He feels unstable and is worried about falling. He did have some physical therapy that initially he thought was helping, but was stopped during the pandemic. He continued his exercises at home but he is not continuing to improve, in fact he may be regressing. He certainly has a fear of falling. He brings his home blood pressure readings which are all okay. Review of Systems   Constitutional: Negative for appetite change and fever. HENT: Negative for trouble swallowing. Respiratory: Negative for shortness of breath. Cardiovascular: Negative for palpitations. Gastrointestinal: Negative. Musculoskeletal: Negative for myalgias. Skin: Negative for rash. Neurological: Positive for weakness. Psychiatric/Behavioral: Negative. OBJECTIVE:    /64   Temp 99 °F (37.2 °C)   Ht 5' 10\" (1.778 m)   Wt 160 lb (72.6 kg)   BMI 22.96 kg/m²      Physical Exam  Constitutional:       General: He is not in acute distress. Appearance: He is well-developed. He is not diaphoretic. Eyes:      General: No scleral icterus. Extraocular Movements: Extraocular movements intact. Neck:      Vascular: No carotid bruit or JVD. Comments: Dedra Grimes site ok  Cardiovascular:      Rate and Rhythm: Normal rate and regular rhythm. Pulses:           Carotid pulses are 2+ on the right side and 2+ on the left side. Radial pulses are 2+ on the right side and 2+ on the left side. Heart sounds: Murmur present.  Systolic murmur present with a grade of 2/6. Pulmonary:      Effort: Pulmonary effort is normal. No respiratory distress. Breath sounds: Normal breath sounds. No stridor. No wheezing or rales. Abdominal:      General: Bowel sounds are normal. There is no abdominal bruit. Palpations: Abdomen is soft. Tenderness: There is no abdominal tenderness. Musculoskeletal:      Right lower leg: Edema present. Left lower leg: Edema present. Lymphadenopathy:      Head:      Right side of head: No submandibular adenopathy. Left side of head: No submandibular adenopathy. Cervical: No cervical adenopathy. Upper Body:      Right upper body: No supraclavicular or epitrochlear adenopathy. Left upper body: No supraclavicular or epitrochlear adenopathy. Skin:     Coloration: Skin is not jaundiced or pale. Neurological:      Mental Status: He is alert and oriented to person, place, and time. Motor: No tremor. Gait: Gait abnormal.      Comments: Difficulty getting out of a seated position. Wobbly when standing. Romberg negative. Psychiatric:         Behavior: Behavior normal.         Thought Content: Thought content normal.         Judgment: Judgment normal.         ASSESSMENT:       Encounter Diagnoses   Name Primary?  Renal insufficiency Yes    Essential hypertension     Elevated PSA     Muscle weakness     Abnormality of gait and mobility     Chronic mucus hypersecretion, respiratory        Abnormality of gait and mobility  Weakness of legs, and a sensation that he cannot rely on them to hold him up. However on exam he can generate good strength. Physical therapy helped initially. Refer to physiatry for opinion regarding further testing or therapies.     Chronic mucus hypersecretion, respiratory  Stable-doing well    HTN (hypertension)  BP okay-continue olmesartan    Renal insufficiency  Last creatinine improved 1.2-recheck        PLAN:See ASSESSMENT for evaluation & PLAN     Orders Placed This Encounter   Procedures    CBC Auto Differential     Standing Status:   Future     Standing Expiration Date:   7/13/2021    Comprehensive Metabolic Panel     Standing Status:   Future     Standing Expiration Date:   7/13/2021    TSH WITH REFLEX TO FT4     Standing Status:   Future     Standing Expiration Date:   7/13/2021    CK     Standing Status:   Future     Standing Expiration Date:   7/13/2021    PSA, Prostatic Specific Antigen     Standing Status:   Future     Standing Expiration Date:   7/14/2021     , PMH, SH and FH reviewed and noted. Recent and past labs, tests and consultsalso reviewed. Recent or new meds also reviewed.

## 2022-09-04 NOTE — PROGRESS NOTES
[facility] PROGRESS NOTE    6/27/2021 3:06 PM        Name: Alison Kraus . Admitted: 6/17/2021  Primary Care Provider: Jerod Bliss MD (Tel: [phone]) [address] course: Alison Kraus is a 79 y.o. male  with history of hypertension, laryngeal cancer status post laryngectomy who presented to ED with sacral decub ulcers. Imaging ruled out acute osteomyelitis. On IV antibiotics. Hospital stay prolonged due to acute onset hypoxic respiratory failure and sepsis, likely secondary to infected sacral decub ulcer. Subjective: Patient seen and examined at bedside. Currently on 8 L 30% FiO2 per trach collar. Patient denies any acute complaints.   Reviewed interval ancillary notes    Current Medications  ipratropium-albuterol (DUONEB) nebulizer solution 1 ampule, 4x daily  meropenem (MERREM) 1,000 mg in sodium chloride 0.9 % 100 mL IVPB (mini-bag), Q8H  linezolid (ZYVOX) IVPB 600 mg, Q12H  lactobacillus (CULTURELLE) capsule 1 capsule, BID WC  cyclobenzaprine (FLEXERIL) tablet 5 mg, TID PRN  Venelex ointment, BID  tamsulosin (FLOMAX) capsule 0.4 mg, Daily  losartan (COZAAR) tablet 25 mg, Daily  0.9 % sodium chloride infusion, Continuous  sodium chloride flush 0.9 % injection 5-40 mL, 2 times per day  sodium chloride flush 0.9 % injection 5-40 mL, PRN  0.9 % sodium chloride infusion, PRN  enoxaparin (LOVENOX) injection 40 mg, Daily  ondansetron (ZOFRAN-ODT) disintegrating tablet 4 mg, Q8H PRN   Or  ondansetron (ZOFRAN) injection 4 mg, Q6H PRN  polyethylene glycol (GLYCOLAX) packet 17 g, Daily PRN  acetaminophen (TYLENOL) tablet 650 mg, Q6H PRN   Or  acetaminophen (TYLENOL) suppository 650 mg, Q6H PRN        Objective:  BP (!) 156/69   Pulse 104   Temp 98.9 °F (37.2 °C) (Oral)   Resp 25   Ht 5' 10\" (1.778 m)   Wt 136 lb 14.4 oz (62.1 kg)   SpO2 95% BMI 19.64 kg/m²     Intake/Output Summary (Last 24 hours) at 6/27/2021 1506  Last data filed at 6/27/2021 0900  Gross per 24 hour   Intake 480 ml   Output --   Net 480 ml      Wt Readings from Last 3 Encounters:   06/17/21 136 lb 14.4 oz (62.1 kg)   07/13/20 160 lb (72.6 kg)   01/13/20 163 lb (73.9 kg)       General appearance: frail  male, chronically ill appearing  Cardiovascular: Regular rhythm, normal S1, S2. No murmur. No edema in lower extremities  Respiratory: 8L O2 supplementation via stoma, decreased bibasilar breath sounds, no wheeze or crackles. GI: Abdomen soft, no tenderness, not distended, normal bowel sounds  Musculoskeletal: Contractures noted of lower extremities, no cyanosis in digits, neck supple  Neurology: CN 2-12 grossly intact. No speech or motor deficits  Psych: Normal affect. Alert and oriented in time, place and person  Skin: Warm, dry, normal turgor, sacral decub ulcer stage 2    Labs and Tests:  CBC:   Recent Labs     06/25/21  0652 06/26/21  0554 06/27/21  0627   WBC 15.6* 16.5* 13.4*   HGB 9.7* 9.6* 9.6*    403 406     BMP:    Recent Labs     06/25/21  0652 06/26/21  0554 06/27/21  0627   * 133* 134*   K 4.2 4.0 4.7    100 101   CO2 26 24 26   BUN 21* 17 14   CREATININE 0.8 0.7* 0.6*   GLUCOSE 121* 123* 162*     Hepatic:   No results for input(s): AST, ALT, ALB, BILITOT, ALKPHOS in the last 72 hours.     Discussed care with family and patient       Spent 30  minutes with patient and family at bedside and on unit reviewing medical records and labs, spent greater than 50% time counseling patient and family on diagnosis and plan   Problem List  Active Problems:    Failure to thrive in adult    Mild malnutrition ([facility] [address]    Pressure injury of skin of left buttock    Inability to walk    Low grade fever    Bandemia    Hematuria    History of laryngeal cancer    H/O laryngectomy    History of bad fall    Need for diphtheria-tetanus-pertussis (Tdap) vaccine  Resolved Problems:    * No resolved hospital problems. *       Assessment & Plan:     Persistent leukocytosis, low-grade fevers:   WBC count continues to trend up on IV antibiotics. 16.5k today am.   Wound cultures, blood cultures negative to date. As per ID service, receiving IV linezolid and meropenem since 6/24. Sacral decub ulcers:   CT  Imaging on admission ruled out acute osteomyelitis. Late in the hospital stay, patient developed persistent leukocytosis. Ultimately today receiving wound care. ID consulted. on IV Linezolid and meropenem. Acute hypoxic resp failure:   Continues to need 8L O2/30% FiO2 via stoma. Chest xray portable showed possible aspiration vs pneumonia. Sputum c/s ordered. IV abx as mentioned above. Pulmonology consulted. Patient underwent bronchoscopy on 6/25. Copious amounts of secretions aspirated during the bronchoscopy and sent for cultures and cytology. No obvious bleeding or endobronchial nodes reported. Plan to wean off of oxygen as tolerated. Aspiration, poss dysphagia:   SLP eval done. MBS done. Diet as per SLP recs. Failure to thrive  Patient did poorly with PT/OT. Peer to peer review for SNF placement was denied since patient has been chronically immobilized. However after recent change in clinical condition and acute hypoxic respiratory failure, might be unable for rehab.  working on placement. If patient gets refused for SNF,  will need long-term care versus discharge home with home care. Other comorbidities:  Hypertension  Laryngeal cancer status post laryngectomy  Severe malnutrition    Diet: ADULT DIET; Easy to Chew  Adult Oral Nutrition Supplement; Standard High Calorie/High Protein Oral Supplement  Code:Full Code  DVT PPX lovenox     Dispo: D/c held due to dyspnea and worsening leucocytosis.    Discharge to [place] [id number] vs home with home care      Abelardo Bell MD   6/27/2021 3:06 PM No

## (undated) DEVICE — GOWN AURORA NONREINF LG: Brand: MEDLINE INDUSTRIES, INC.

## (undated) DEVICE — SINGLE USE SUCTION VALVE MAJ-209: Brand: SINGLE USE SUCTION VALVE (STERILE)

## (undated) DEVICE — SPECIMEN TRAP: Brand: ARGYLE

## (undated) DEVICE — PROCEDURE KIT ENDOSCP CUST

## (undated) DEVICE — SYRINGE MED 10ML POLYPR LUERSLIP TIP FLAT TOP W/O SFTY DISP

## (undated) DEVICE — ENDOSCOPY KIT: Brand: MEDLINE INDUSTRIES, INC.

## (undated) DEVICE — SYRINGE MED 50ML LUERLOCK TIP

## (undated) DEVICE — SINGLE USE BIOPSY VALVE MAJ-210: Brand: SINGLE USE BIOPSY VALVE (STERILE)

## (undated) DEVICE — CONMED CHANNEL MASTER PULMONARY AND PEDIATRIC CLEANING BRUSH, 160 CM X 2.0 MM: Brand: CONMED